# Patient Record
Sex: MALE | Race: BLACK OR AFRICAN AMERICAN | NOT HISPANIC OR LATINO
[De-identification: names, ages, dates, MRNs, and addresses within clinical notes are randomized per-mention and may not be internally consistent; named-entity substitution may affect disease eponyms.]

---
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
## 2020-08-03 ENCOUNTER — WALK IN (OUTPATIENT)
Dept: URGENT CARE | Age: 20
End: 2020-08-03

## 2020-08-03 DIAGNOSIS — H92.01 OTALGIA OF RIGHT EAR: Primary | ICD-10-CM

## 2020-08-03 PROCEDURE — 99213 OFFICE O/P EST LOW 20 MIN: CPT | Performed by: NURSE PRACTITIONER

## 2020-08-03 RX ORDER — OFLOXACIN 3 MG/ML
SOLUTION AURICULAR (OTIC)
COMMUNITY
Start: 2020-08-01

## 2020-08-03 SDOH — HEALTH STABILITY: MENTAL HEALTH: HOW OFTEN DO YOU HAVE A DRINK CONTAINING ALCOHOL?: NEVER

## 2020-08-03 ASSESSMENT — ENCOUNTER SYMPTOMS
WOUND: 0
PSYCHIATRIC NEGATIVE: 1
STRIDOR: 0
SWOLLEN GLANDS: 0
COLOR CHANGE: 0
FACIAL SWELLING: 0
CHILLS: 0
PHOTOPHOBIA: 0
APPETITE CHANGE: 0
HEMATOLOGIC/LYMPHATIC NEGATIVE: 1
VISUAL CHANGE: 0
EYE REDNESS: 0
ABDOMINAL PAIN: 0
VOMITING: 0
EYE PAIN: 0
CHANGE IN BOWEL HABIT: 0
CHOKING: 0
FEVER: 0
ANOREXIA: 0
ABDOMINAL DISTENTION: 0
WEAKNESS: 0
NUMBNESS: 0
COUGH: 0
SEIZURES: 0
SINUS PRESSURE: 0
EYE DISCHARGE: 0
SINUS PAIN: 0
DIAPHORESIS: 0
CHEST TIGHTNESS: 0
TROUBLE SWALLOWING: 0
LIGHT-HEADEDNESS: 0
VERTIGO: 0
SORE THROAT: 0
HEADACHES: 0
NAUSEA: 0
DIZZINESS: 0
WHEEZING: 0
CONSTIPATION: 0
ACTIVITY CHANGE: 0
RHINORRHEA: 0
EYE ITCHING: 0
APNEA: 0
DIARRHEA: 0
FATIGUE: 0
VOICE CHANGE: 0
TREMORS: 0
FACIAL ASYMMETRY: 0
SPEECH DIFFICULTY: 0
SHORTNESS OF BREATH: 0

## 2020-08-03 ASSESSMENT — PAIN SCALES - GENERAL: PAINLEVEL: 9-10

## 2020-08-07 ENCOUNTER — APPOINTMENT (OUTPATIENT)
Dept: OTOLARYNGOLOGY | Age: 20
End: 2020-08-07

## 2020-08-10 ENCOUNTER — APPOINTMENT (OUTPATIENT)
Dept: OTOLARYNGOLOGY | Age: 20
End: 2020-08-10

## 2020-08-10 ENCOUNTER — TELEPHONE (OUTPATIENT)
Dept: SCHEDULING | Age: 20
End: 2020-08-10

## 2020-09-02 ENCOUNTER — APPOINTMENT (OUTPATIENT)
Dept: OTOLARYNGOLOGY | Age: 20
End: 2020-09-02

## 2021-05-25 VITALS
BODY MASS INDEX: 26.46 KG/M2 | OXYGEN SATURATION: 99 % | SYSTOLIC BLOOD PRESSURE: 123 MMHG | RESPIRATION RATE: 16 BRPM | TEMPERATURE: 98.2 F | DIASTOLIC BLOOD PRESSURE: 82 MMHG | HEIGHT: 74 IN | HEART RATE: 75 BPM | WEIGHT: 206.2 LBS

## 2021-06-22 ENCOUNTER — APPOINTMENT (EMERGENCY)
Dept: CT IMAGING | Facility: HOSPITAL | Age: 21
DRG: 245 | End: 2021-06-22
Payer: COMMERCIAL

## 2021-06-22 ENCOUNTER — HOSPITAL ENCOUNTER (INPATIENT)
Facility: HOSPITAL | Age: 21
LOS: 7 days | Discharge: HOME | DRG: 245 | End: 2021-06-30
Attending: EMERGENCY MEDICINE | Admitting: FAMILY MEDICINE
Payer: COMMERCIAL

## 2021-06-22 DIAGNOSIS — D50.0 IRON DEFICIENCY ANEMIA DUE TO CHRONIC BLOOD LOSS: ICD-10-CM

## 2021-06-22 DIAGNOSIS — K52.9 COLITIS: Primary | ICD-10-CM

## 2021-06-22 DIAGNOSIS — K51.911 ULCERATIVE COLITIS WITH RECTAL BLEEDING, UNSPECIFIED LOCATION (HCC): ICD-10-CM

## 2021-06-22 DIAGNOSIS — K62.5 RECTAL BLEEDING: ICD-10-CM

## 2021-06-22 PROBLEM — D64.9 ANEMIA: Status: ACTIVE | Noted: 2021-06-22

## 2021-06-22 LAB
ALBUMIN SERPL BCP-MCNC: 3.1 G/DL (ref 3.5–5)
ALP SERPL-CCNC: 61 U/L (ref 46–116)
ALT SERPL W P-5'-P-CCNC: 19 U/L (ref 12–78)
ANION GAP SERPL CALCULATED.3IONS-SCNC: 8 MMOL/L (ref 4–13)
AST SERPL W P-5'-P-CCNC: 11 U/L (ref 5–45)
BASOPHILS # BLD AUTO: 0.04 THOUSANDS/ΜL (ref 0–0.1)
BASOPHILS NFR BLD AUTO: 1 % (ref 0–1)
BILIRUB SERPL-MCNC: 0.54 MG/DL (ref 0.2–1)
BUN SERPL-MCNC: 12 MG/DL (ref 5–25)
CALCIUM ALBUM COR SERPL-MCNC: 9.6 MG/DL (ref 8.3–10.1)
CALCIUM SERPL-MCNC: 8.9 MG/DL (ref 8.3–10.1)
CHLORIDE SERPL-SCNC: 102 MMOL/L (ref 100–108)
CO2 SERPL-SCNC: 27 MMOL/L (ref 21–32)
CREAT SERPL-MCNC: 1.04 MG/DL (ref 0.6–1.3)
EOSINOPHIL # BLD AUTO: 0.39 THOUSAND/ΜL (ref 0–0.61)
EOSINOPHIL NFR BLD AUTO: 5 % (ref 0–6)
ERYTHROCYTE [DISTWIDTH] IN BLOOD BY AUTOMATED COUNT: 13.5 % (ref 11.6–15.1)
GFR SERPL CREATININE-BSD FRML MDRD: 102 ML/MIN/1.73SQ M
GLUCOSE SERPL-MCNC: 100 MG/DL (ref 65–140)
HCT VFR BLD AUTO: 30.6 % (ref 36.5–49.3)
HGB BLD-MCNC: 9.2 G/DL (ref 12–17)
IMM GRANULOCYTES # BLD AUTO: 0.02 THOUSAND/UL (ref 0–0.2)
IMM GRANULOCYTES NFR BLD AUTO: 0 % (ref 0–2)
LIPASE SERPL-CCNC: 57 U/L (ref 73–393)
LYMPHOCYTES # BLD AUTO: 2.4 THOUSANDS/ΜL (ref 0.6–4.47)
LYMPHOCYTES NFR BLD AUTO: 31 % (ref 14–44)
MCH RBC QN AUTO: 22.4 PG (ref 26.8–34.3)
MCHC RBC AUTO-ENTMCNC: 30.1 G/DL (ref 31.4–37.4)
MCV RBC AUTO: 75 FL (ref 82–98)
MONOCYTES # BLD AUTO: 0.93 THOUSAND/ΜL (ref 0.17–1.22)
MONOCYTES NFR BLD AUTO: 12 % (ref 4–12)
NEUTROPHILS # BLD AUTO: 3.98 THOUSANDS/ΜL (ref 1.85–7.62)
NEUTS SEG NFR BLD AUTO: 51 % (ref 43–75)
NRBC BLD AUTO-RTO: 0 /100 WBCS
PLATELET # BLD AUTO: 283 THOUSANDS/UL (ref 149–390)
PMV BLD AUTO: 10.8 FL (ref 8.9–12.7)
POTASSIUM SERPL-SCNC: 3.8 MMOL/L (ref 3.5–5.3)
PROT SERPL-MCNC: 7.5 G/DL (ref 6.4–8.2)
RBC # BLD AUTO: 4.11 MILLION/UL (ref 3.88–5.62)
SODIUM SERPL-SCNC: 137 MMOL/L (ref 136–145)
WBC # BLD AUTO: 7.76 THOUSAND/UL (ref 4.31–10.16)

## 2021-06-22 PROCEDURE — 99220 PR INITIAL OBSERVATION CARE/DAY 70 MINUTES: CPT | Performed by: INTERNAL MEDICINE

## 2021-06-22 PROCEDURE — 99285 EMERGENCY DEPT VISIT HI MDM: CPT | Performed by: EMERGENCY MEDICINE

## 2021-06-22 PROCEDURE — 99285 EMERGENCY DEPT VISIT HI MDM: CPT

## 2021-06-22 PROCEDURE — 74177 CT ABD & PELVIS W/CONTRAST: CPT

## 2021-06-22 PROCEDURE — 96361 HYDRATE IV INFUSION ADD-ON: CPT

## 2021-06-22 PROCEDURE — C9113 INJ PANTOPRAZOLE SODIUM, VIA: HCPCS | Performed by: INTERNAL MEDICINE

## 2021-06-22 PROCEDURE — 83690 ASSAY OF LIPASE: CPT | Performed by: EMERGENCY MEDICINE

## 2021-06-22 PROCEDURE — 80053 COMPREHEN METABOLIC PANEL: CPT | Performed by: EMERGENCY MEDICINE

## 2021-06-22 PROCEDURE — 96360 HYDRATION IV INFUSION INIT: CPT

## 2021-06-22 PROCEDURE — 36415 COLL VENOUS BLD VENIPUNCTURE: CPT

## 2021-06-22 PROCEDURE — 83550 IRON BINDING TEST: CPT | Performed by: INTERNAL MEDICINE

## 2021-06-22 PROCEDURE — 83540 ASSAY OF IRON: CPT | Performed by: INTERNAL MEDICINE

## 2021-06-22 PROCEDURE — 82728 ASSAY OF FERRITIN: CPT | Performed by: INTERNAL MEDICINE

## 2021-06-22 PROCEDURE — 85025 COMPLETE CBC W/AUTO DIFF WBC: CPT | Performed by: EMERGENCY MEDICINE

## 2021-06-22 RX ORDER — DICYCLOMINE HCL 20 MG
20 TABLET ORAL ONCE
Status: COMPLETED | OUTPATIENT
Start: 2021-06-22 | End: 2021-06-22

## 2021-06-22 RX ORDER — SODIUM CHLORIDE 9 MG/ML
100 INJECTION, SOLUTION INTRAVENOUS CONTINUOUS
Status: DISCONTINUED | OUTPATIENT
Start: 2021-06-22 | End: 2021-06-25

## 2021-06-22 RX ORDER — ONDANSETRON 2 MG/ML
4 INJECTION INTRAMUSCULAR; INTRAVENOUS EVERY 6 HOURS PRN
Status: DISCONTINUED | OUTPATIENT
Start: 2021-06-22 | End: 2021-06-30 | Stop reason: HOSPADM

## 2021-06-22 RX ORDER — PANTOPRAZOLE SODIUM 40 MG/1
40 INJECTION, POWDER, FOR SOLUTION INTRAVENOUS EVERY 12 HOURS SCHEDULED
Status: DISCONTINUED | OUTPATIENT
Start: 2021-06-22 | End: 2021-06-29

## 2021-06-22 RX ORDER — ACETAMINOPHEN 325 MG/1
650 TABLET ORAL EVERY 6 HOURS PRN
Status: DISCONTINUED | OUTPATIENT
Start: 2021-06-22 | End: 2021-06-30 | Stop reason: HOSPADM

## 2021-06-22 RX ADMIN — IOHEXOL 100 ML: 350 INJECTION, SOLUTION INTRAVENOUS at 15:56

## 2021-06-22 RX ADMIN — SODIUM CHLORIDE 100 ML/HR: 0.9 INJECTION, SOLUTION INTRAVENOUS at 17:49

## 2021-06-22 RX ADMIN — DICYCLOMINE HYDROCHLORIDE 20 MG: 20 TABLET ORAL at 15:42

## 2021-06-22 RX ADMIN — SODIUM CHLORIDE 1000 ML: 0.9 INJECTION, SOLUTION INTRAVENOUS at 15:42

## 2021-06-22 RX ADMIN — PANTOPRAZOLE SODIUM 40 MG: 40 INJECTION, POWDER, FOR SOLUTION INTRAVENOUS at 21:30

## 2021-06-23 LAB
ANION GAP SERPL CALCULATED.3IONS-SCNC: 9 MMOL/L (ref 4–13)
BUN SERPL-MCNC: 10 MG/DL (ref 5–25)
CALCIUM SERPL-MCNC: 8.9 MG/DL (ref 8.3–10.1)
CHLORIDE SERPL-SCNC: 103 MMOL/L (ref 100–108)
CO2 SERPL-SCNC: 27 MMOL/L (ref 21–32)
CREAT SERPL-MCNC: 0.92 MG/DL (ref 0.6–1.3)
CRP SERPL QL: 114.7 MG/L
ERYTHROCYTE [DISTWIDTH] IN BLOOD BY AUTOMATED COUNT: 13.5 % (ref 11.6–15.1)
FERRITIN SERPL-MCNC: 17 NG/ML (ref 8–388)
GFR SERPL CREATININE-BSD FRML MDRD: 118 ML/MIN/1.73SQ M
GLUCOSE P FAST SERPL-MCNC: 88 MG/DL (ref 65–99)
GLUCOSE SERPL-MCNC: 88 MG/DL (ref 65–140)
HCT VFR BLD AUTO: 24.7 % (ref 36.5–49.3)
HCT VFR BLD AUTO: 25.8 % (ref 36.5–49.3)
HGB BLD-MCNC: 7.5 G/DL (ref 12–17)
HGB BLD-MCNC: 7.8 G/DL (ref 12–17)
IRON SATN MFR SERPL: 3 %
IRON SERPL-MCNC: 10 UG/DL (ref 65–175)
MCH RBC QN AUTO: 22.7 PG (ref 26.8–34.3)
MCHC RBC AUTO-ENTMCNC: 30.2 G/DL (ref 31.4–37.4)
MCV RBC AUTO: 75 FL (ref 82–98)
PLATELET # BLD AUTO: 260 THOUSANDS/UL (ref 149–390)
PMV BLD AUTO: 11.1 FL (ref 8.9–12.7)
POTASSIUM SERPL-SCNC: 4 MMOL/L (ref 3.5–5.3)
RBC # BLD AUTO: 3.43 MILLION/UL (ref 3.88–5.62)
SODIUM SERPL-SCNC: 139 MMOL/L (ref 136–145)
TIBC SERPL-MCNC: 394 UG/DL (ref 250–450)
WBC # BLD AUTO: 6.89 THOUSAND/UL (ref 4.31–10.16)

## 2021-06-23 PROCEDURE — 83993 ASSAY FOR CALPROTECTIN FECAL: CPT | Performed by: PHYSICIAN ASSISTANT

## 2021-06-23 PROCEDURE — 85027 COMPLETE CBC AUTOMATED: CPT | Performed by: INTERNAL MEDICINE

## 2021-06-23 PROCEDURE — 85018 HEMOGLOBIN: CPT | Performed by: INTERNAL MEDICINE

## 2021-06-23 PROCEDURE — NC001 PR NO CHARGE: Performed by: PHYSICIAN ASSISTANT

## 2021-06-23 PROCEDURE — 87505 NFCT AGENT DETECTION GI: CPT | Performed by: PHYSICIAN ASSISTANT

## 2021-06-23 PROCEDURE — 80048 BASIC METABOLIC PNL TOTAL CA: CPT | Performed by: INTERNAL MEDICINE

## 2021-06-23 PROCEDURE — C9113 INJ PANTOPRAZOLE SODIUM, VIA: HCPCS | Performed by: INTERNAL MEDICINE

## 2021-06-23 PROCEDURE — 85014 HEMATOCRIT: CPT | Performed by: INTERNAL MEDICINE

## 2021-06-23 PROCEDURE — 99223 1ST HOSP IP/OBS HIGH 75: CPT | Performed by: INTERNAL MEDICINE

## 2021-06-23 PROCEDURE — 99232 SBSQ HOSP IP/OBS MODERATE 35: CPT | Performed by: FAMILY MEDICINE

## 2021-06-23 PROCEDURE — 87493 C DIFF AMPLIFIED PROBE: CPT | Performed by: PHYSICIAN ASSISTANT

## 2021-06-23 PROCEDURE — 86140 C-REACTIVE PROTEIN: CPT | Performed by: PHYSICIAN ASSISTANT

## 2021-06-23 RX ORDER — POLYETHYLENE GLYCOL 3350, SODIUM CHLORIDE, SODIUM BICARBONATE, POTASSIUM CHLORIDE 420; 11.2; 5.72; 1.48 G/4L; G/4L; G/4L; G/4L
4000 POWDER, FOR SOLUTION ORAL SEE ADMIN INSTRUCTIONS
Status: COMPLETED | OUTPATIENT
Start: 2021-06-23 | End: 2021-06-23

## 2021-06-23 RX ADMIN — IRON SUCROSE 300 MG: 20 INJECTION, SOLUTION INTRAVENOUS at 09:53

## 2021-06-23 RX ADMIN — SODIUM CHLORIDE 100 ML/HR: 0.9 INJECTION, SOLUTION INTRAVENOUS at 18:55

## 2021-06-23 RX ADMIN — Medication 10 MG: at 17:17

## 2021-06-23 RX ADMIN — SODIUM CHLORIDE 100 ML/HR: 0.9 INJECTION, SOLUTION INTRAVENOUS at 07:22

## 2021-06-23 RX ADMIN — PANTOPRAZOLE SODIUM 40 MG: 40 INJECTION, POWDER, FOR SOLUTION INTRAVENOUS at 08:07

## 2021-06-23 RX ADMIN — Medication 10 MG: at 20:25

## 2021-06-23 RX ADMIN — POLYETHYLENE GLYCOL 3350, SODIUM CHLORIDE, SODIUM BICARBONATE AND POTASSIUM CHLORIDE WITH LEMON FLAVOR 4000 ML: 420; 11.2; 5.72; 1.48 POWDER, FOR SOLUTION ORAL at 17:17

## 2021-06-23 RX ADMIN — PANTOPRAZOLE SODIUM 40 MG: 40 INJECTION, POWDER, FOR SOLUTION INTRAVENOUS at 21:54

## 2021-06-24 LAB
C DIFF TOX B TCDB STL QL NAA+PROBE: NEGATIVE
CAMPYLOBACTER DNA SPEC NAA+PROBE: NORMAL
HCT VFR BLD AUTO: 25.1 % (ref 36.5–49.3)
HGB BLD-MCNC: 7.7 G/DL (ref 12–17)
SALMONELLA DNA SPEC QL NAA+PROBE: NORMAL
SHIGA TOXIN STX GENE SPEC NAA+PROBE: NORMAL
SHIGELLA DNA SPEC QL NAA+PROBE: NORMAL

## 2021-06-24 PROCEDURE — NC001 PR NO CHARGE: Performed by: PHYSICIAN ASSISTANT

## 2021-06-24 PROCEDURE — 99232 SBSQ HOSP IP/OBS MODERATE 35: CPT | Performed by: FAMILY MEDICINE

## 2021-06-24 PROCEDURE — 99232 SBSQ HOSP IP/OBS MODERATE 35: CPT | Performed by: INTERNAL MEDICINE

## 2021-06-24 PROCEDURE — C9113 INJ PANTOPRAZOLE SODIUM, VIA: HCPCS | Performed by: INTERNAL MEDICINE

## 2021-06-24 PROCEDURE — 85018 HEMOGLOBIN: CPT | Performed by: FAMILY MEDICINE

## 2021-06-24 PROCEDURE — 85014 HEMATOCRIT: CPT | Performed by: FAMILY MEDICINE

## 2021-06-24 RX ORDER — MAGNESIUM CARB/ALUMINUM HYDROX 105-160MG
296 TABLET,CHEWABLE ORAL ONCE
Status: COMPLETED | OUTPATIENT
Start: 2021-06-24 | End: 2021-06-24

## 2021-06-24 RX ADMIN — SODIUM CHLORIDE 100 ML/HR: 0.9 INJECTION, SOLUTION INTRAVENOUS at 05:05

## 2021-06-24 RX ADMIN — PANTOPRAZOLE SODIUM 40 MG: 40 INJECTION, POWDER, FOR SOLUTION INTRAVENOUS at 08:45

## 2021-06-24 RX ADMIN — MAGNESIUM CITRATE 296 ML: 1.75 LIQUID ORAL at 14:59

## 2021-06-24 RX ADMIN — SODIUM CHLORIDE 100 ML/HR: 0.9 INJECTION, SOLUTION INTRAVENOUS at 17:35

## 2021-06-24 RX ADMIN — MAGNESIUM CITRATE 296 ML: 1.75 LIQUID ORAL at 08:45

## 2021-06-24 RX ADMIN — PANTOPRAZOLE SODIUM 40 MG: 40 INJECTION, POWDER, FOR SOLUTION INTRAVENOUS at 21:01

## 2021-06-24 RX ADMIN — IRON SUCROSE 300 MG: 20 INJECTION, SOLUTION INTRAVENOUS at 08:45

## 2021-06-24 RX ADMIN — Medication 10 MG: at 17:33

## 2021-06-25 ENCOUNTER — APPOINTMENT (INPATIENT)
Dept: GASTROENTEROLOGY | Facility: HOSPITAL | Age: 21
DRG: 245 | End: 2021-06-25
Payer: COMMERCIAL

## 2021-06-25 ENCOUNTER — ANESTHESIA (INPATIENT)
Dept: GASTROENTEROLOGY | Facility: HOSPITAL | Age: 21
DRG: 245 | End: 2021-06-25
Payer: COMMERCIAL

## 2021-06-25 ENCOUNTER — ANESTHESIA EVENT (INPATIENT)
Dept: ANESTHESIOLOGY | Facility: HOSPITAL | Age: 21
DRG: 245 | End: 2021-06-25
Payer: COMMERCIAL

## 2021-06-25 ENCOUNTER — ANESTHESIA EVENT (INPATIENT)
Dept: GASTROENTEROLOGY | Facility: HOSPITAL | Age: 21
DRG: 245 | End: 2021-06-25
Payer: COMMERCIAL

## 2021-06-25 ENCOUNTER — ANESTHESIA (INPATIENT)
Dept: ANESTHESIOLOGY | Facility: HOSPITAL | Age: 21
DRG: 245 | End: 2021-06-25
Payer: COMMERCIAL

## 2021-06-25 PROBLEM — K51.90 ULCERATIVE COLITIS (HCC): Status: ACTIVE | Noted: 2021-06-25

## 2021-06-25 LAB
BASOPHILS # BLD AUTO: 0.04 THOUSANDS/ΜL (ref 0–0.1)
BASOPHILS NFR BLD AUTO: 1 % (ref 0–1)
EOSINOPHIL # BLD AUTO: 0.17 THOUSAND/ΜL (ref 0–0.61)
EOSINOPHIL NFR BLD AUTO: 2 % (ref 0–6)
ERYTHROCYTE [DISTWIDTH] IN BLOOD BY AUTOMATED COUNT: 13.9 % (ref 11.6–15.1)
HCT VFR BLD AUTO: 25.3 % (ref 36.5–49.3)
HGB BLD-MCNC: 7.6 G/DL (ref 12–17)
IMM GRANULOCYTES # BLD AUTO: 0.05 THOUSAND/UL (ref 0–0.2)
IMM GRANULOCYTES NFR BLD AUTO: 1 % (ref 0–2)
LYMPHOCYTES # BLD AUTO: 1.85 THOUSANDS/ΜL (ref 0.6–4.47)
LYMPHOCYTES NFR BLD AUTO: 24 % (ref 14–44)
MCH RBC QN AUTO: 22.4 PG (ref 26.8–34.3)
MCHC RBC AUTO-ENTMCNC: 30 G/DL (ref 31.4–37.4)
MCV RBC AUTO: 75 FL (ref 82–98)
MONOCYTES # BLD AUTO: 1.03 THOUSAND/ΜL (ref 0.17–1.22)
MONOCYTES NFR BLD AUTO: 14 % (ref 4–12)
NEUTROPHILS # BLD AUTO: 4.49 THOUSANDS/ΜL (ref 1.85–7.62)
NEUTS SEG NFR BLD AUTO: 58 % (ref 43–75)
NRBC BLD AUTO-RTO: 0 /100 WBCS
PLATELET # BLD AUTO: 248 THOUSANDS/UL (ref 149–390)
PMV BLD AUTO: 10.6 FL (ref 8.9–12.7)
RBC # BLD AUTO: 3.39 MILLION/UL (ref 3.88–5.62)
WBC # BLD AUTO: 7.63 THOUSAND/UL (ref 4.31–10.16)

## 2021-06-25 PROCEDURE — 45380 COLONOSCOPY AND BIOPSY: CPT | Performed by: INTERNAL MEDICINE

## 2021-06-25 PROCEDURE — 0DDN8ZX EXTRACTION OF SIGMOID COLON, VIA NATURAL OR ARTIFICIAL OPENING ENDOSCOPIC, DIAGNOSTIC: ICD-10-PCS | Performed by: INTERNAL MEDICINE

## 2021-06-25 PROCEDURE — 0DDK8ZX EXTRACTION OF ASCENDING COLON, VIA NATURAL OR ARTIFICIAL OPENING ENDOSCOPIC, DIAGNOSTIC: ICD-10-PCS | Performed by: INTERNAL MEDICINE

## 2021-06-25 PROCEDURE — 0DDM8ZX EXTRACTION OF DESCENDING COLON, VIA NATURAL OR ARTIFICIAL OPENING ENDOSCOPIC, DIAGNOSTIC: ICD-10-PCS | Performed by: INTERNAL MEDICINE

## 2021-06-25 PROCEDURE — 85025 COMPLETE CBC W/AUTO DIFF WBC: CPT | Performed by: FAMILY MEDICINE

## 2021-06-25 PROCEDURE — 99232 SBSQ HOSP IP/OBS MODERATE 35: CPT | Performed by: FAMILY MEDICINE

## 2021-06-25 PROCEDURE — 88305 TISSUE EXAM BY PATHOLOGIST: CPT | Performed by: PATHOLOGY

## 2021-06-25 PROCEDURE — 88342 IMHCHEM/IMCYTCHM 1ST ANTB: CPT | Performed by: PATHOLOGY

## 2021-06-25 PROCEDURE — 0DDL8ZX EXTRACTION OF TRANSVERSE COLON, VIA NATURAL OR ARTIFICIAL OPENING ENDOSCOPIC, DIAGNOSTIC: ICD-10-PCS | Performed by: INTERNAL MEDICINE

## 2021-06-25 PROCEDURE — 0DDH8ZX EXTRACTION OF CECUM, VIA NATURAL OR ARTIFICIAL OPENING ENDOSCOPIC, DIAGNOSTIC: ICD-10-PCS | Performed by: INTERNAL MEDICINE

## 2021-06-25 PROCEDURE — C9113 INJ PANTOPRAZOLE SODIUM, VIA: HCPCS | Performed by: INTERNAL MEDICINE

## 2021-06-25 RX ORDER — PROPOFOL 10 MG/ML
INJECTION, EMULSION INTRAVENOUS AS NEEDED
Status: DISCONTINUED | OUTPATIENT
Start: 2021-06-25 | End: 2021-06-25

## 2021-06-25 RX ORDER — MESALAMINE 400 MG/1
800 CAPSULE, DELAYED RELEASE ORAL 3 TIMES DAILY
Status: DISCONTINUED | OUTPATIENT
Start: 2021-06-25 | End: 2021-06-30 | Stop reason: HOSPADM

## 2021-06-25 RX ORDER — LIDOCAINE HYDROCHLORIDE 10 MG/ML
INJECTION, SOLUTION EPIDURAL; INFILTRATION; INTRACAUDAL; PERINEURAL AS NEEDED
Status: DISCONTINUED | OUTPATIENT
Start: 2021-06-25 | End: 2021-06-25

## 2021-06-25 RX ORDER — FENTANYL CITRATE 50 UG/ML
50 INJECTION, SOLUTION INTRAMUSCULAR; INTRAVENOUS ONCE
Status: COMPLETED | OUTPATIENT
Start: 2021-06-25 | End: 2021-06-25

## 2021-06-25 RX ORDER — METHYLPREDNISOLONE SODIUM SUCCINATE 40 MG/ML
20 INJECTION, POWDER, LYOPHILIZED, FOR SOLUTION INTRAMUSCULAR; INTRAVENOUS EVERY 8 HOURS SCHEDULED
Status: DISCONTINUED | OUTPATIENT
Start: 2021-06-25 | End: 2021-06-26

## 2021-06-25 RX ADMIN — PROPOFOL 40 MG: 10 INJECTION, EMULSION INTRAVENOUS at 13:47

## 2021-06-25 RX ADMIN — MESALAMINE 800 MG: 400 CAPSULE, DELAYED RELEASE ORAL at 22:35

## 2021-06-25 RX ADMIN — PROPOFOL 30 MG: 10 INJECTION, EMULSION INTRAVENOUS at 13:35

## 2021-06-25 RX ADMIN — PANTOPRAZOLE SODIUM 40 MG: 40 INJECTION, POWDER, FOR SOLUTION INTRAVENOUS at 22:35

## 2021-06-25 RX ADMIN — FENTANYL CITRATE 50 MCG: 50 INJECTION, SOLUTION INTRAMUSCULAR; INTRAVENOUS at 14:14

## 2021-06-25 RX ADMIN — PANTOPRAZOLE SODIUM 40 MG: 40 INJECTION, POWDER, FOR SOLUTION INTRAVENOUS at 08:05

## 2021-06-25 RX ADMIN — METHYLPREDNISOLONE SODIUM SUCCINATE 20 MG: 40 INJECTION, POWDER, FOR SOLUTION INTRAMUSCULAR; INTRAVENOUS at 16:12

## 2021-06-25 RX ADMIN — PROPOFOL 100 MG: 10 INJECTION, EMULSION INTRAVENOUS at 13:27

## 2021-06-25 RX ADMIN — METHYLPREDNISOLONE SODIUM SUCCINATE 20 MG: 40 INJECTION, POWDER, FOR SOLUTION INTRAMUSCULAR; INTRAVENOUS at 22:35

## 2021-06-25 RX ADMIN — PROPOFOL 30 MG: 10 INJECTION, EMULSION INTRAVENOUS at 13:32

## 2021-06-25 RX ADMIN — PROPOFOL 20 MG: 10 INJECTION, EMULSION INTRAVENOUS at 13:38

## 2021-06-25 RX ADMIN — MESALAMINE 800 MG: 400 CAPSULE, DELAYED RELEASE ORAL at 16:11

## 2021-06-25 RX ADMIN — LIDOCAINE HYDROCHLORIDE 50 MG: 10 INJECTION, SOLUTION EPIDURAL; INFILTRATION; INTRACAUDAL; PERINEURAL at 13:23

## 2021-06-25 RX ADMIN — SODIUM CHLORIDE: 0.9 INJECTION, SOLUTION INTRAVENOUS at 13:50

## 2021-06-25 RX ADMIN — PROPOFOL 150 MG: 10 INJECTION, EMULSION INTRAVENOUS at 13:24

## 2021-06-25 RX ADMIN — PROPOFOL 20 MG: 10 INJECTION, EMULSION INTRAVENOUS at 13:41

## 2021-06-25 RX ADMIN — PROPOFOL 20 MG: 10 INJECTION, EMULSION INTRAVENOUS at 13:45

## 2021-06-25 RX ADMIN — IRON SUCROSE 300 MG: 20 INJECTION, SOLUTION INTRAVENOUS at 08:05

## 2021-06-25 NOTE — ANESTHESIA POSTPROCEDURE EVALUATION
Post-Op Assessment Note    CV Status:  Stable  Pain Score: 0    Pain management: adequate     Mental Status:  Sleepy   Hydration Status:  Stable   PONV Controlled:  None   Airway Patency:  Patent      Post Op Vitals Reviewed: Yes      Staff: CRNA         No complications documented      BP   117/61   Temp      Pulse  85   Resp 20   SpO2   100

## 2021-06-25 NOTE — ANESTHESIA PREPROCEDURE EVALUATION
Procedure:  PRE-OP ONLY    Relevant Problems   GI/HEPATIC   (+) Rectal bleeding      HEMATOLOGY   (+) Anemia             Anesthesia Plan  ASA Score- 2     Anesthesia Type- IV sedation with anesthesia with ASA Monitors  Additional Monitors:   Airway Plan:     Comment: Discussed risks/benefits, including medication reactions, awareness, aspiration, and serious/life threatening complications  Plan to maintain native airway with IVGA, monitored with EtCO2  Plan Factors-Exercise tolerance (METS): >4 METS  Patient summary reviewed  Patient instructed to abstain from smoking on day of procedure  Patient did not smoke on day of surgery  Induction- intravenous  Postoperative Plan-     Informed Consent- Anesthetic plan and risks discussed with patient  I personally reviewed this patient with the CRNA  Discussed and agreed on the Anesthesia Plan with the LAKHWINDER Lazar

## 2021-06-25 NOTE — ANESTHESIA PREPROCEDURE EVALUATION
Procedure:  COLONOSCOPY    Hgb 7 6    Relevant Problems   GI/HEPATIC   (+) Rectal bleeding      HEMATOLOGY   (+) Anemia        Physical Exam    Airway    Mallampati score: I  TM Distance: >3 FB  Neck ROM: full     Dental   No notable dental hx     Cardiovascular  Rhythm: regular, Rate: normal, Cardiovascular exam normal    Pulmonary  Pulmonary exam normal Breath sounds clear to auscultation,     Other Findings        Anesthesia Plan  ASA Score- 2     Anesthesia Type- IV sedation with anesthesia with ASA Monitors  Additional Monitors:   Airway Plan:     Comment: Discussed risks/benefits, including medication reactions, awareness, aspiration, and serious/life threatening complications  Plan to maintain native airway with IVGA, monitored with EtCO2  Plan Factors-Exercise tolerance (METS): >4 METS  Patient summary reviewed  Patient instructed to abstain from smoking on day of procedure  Patient did not smoke on day of surgery  Induction- intravenous  Postoperative Plan-     Informed Consent- Anesthetic plan and risks discussed with patient  I personally reviewed this patient with the CRNA  Discussed and agreed on the Anesthesia Plan with the CRNA  Brandy Moreno

## 2021-06-26 LAB
HCT VFR BLD AUTO: 27.6 % (ref 36.5–49.3)
HGB BLD-MCNC: 8.3 G/DL (ref 12–17)

## 2021-06-26 PROCEDURE — 99232 SBSQ HOSP IP/OBS MODERATE 35: CPT | Performed by: INTERNAL MEDICINE

## 2021-06-26 PROCEDURE — C9113 INJ PANTOPRAZOLE SODIUM, VIA: HCPCS | Performed by: INTERNAL MEDICINE

## 2021-06-26 PROCEDURE — 85018 HEMOGLOBIN: CPT | Performed by: FAMILY MEDICINE

## 2021-06-26 PROCEDURE — 85014 HEMATOCRIT: CPT | Performed by: FAMILY MEDICINE

## 2021-06-26 PROCEDURE — NC001 PR NO CHARGE: Performed by: PHYSICIAN ASSISTANT

## 2021-06-26 PROCEDURE — 99232 SBSQ HOSP IP/OBS MODERATE 35: CPT | Performed by: FAMILY MEDICINE

## 2021-06-26 RX ORDER — METHYLPREDNISOLONE SODIUM SUCCINATE 40 MG/ML
20 INJECTION, POWDER, LYOPHILIZED, FOR SOLUTION INTRAMUSCULAR; INTRAVENOUS EVERY 6 HOURS SCHEDULED
Status: DISCONTINUED | OUTPATIENT
Start: 2021-06-26 | End: 2021-06-28

## 2021-06-26 RX ADMIN — MESALAMINE 800 MG: 400 CAPSULE, DELAYED RELEASE ORAL at 08:33

## 2021-06-26 RX ADMIN — METHYLPREDNISOLONE SODIUM SUCCINATE 20 MG: 40 INJECTION, POWDER, FOR SOLUTION INTRAMUSCULAR; INTRAVENOUS at 17:09

## 2021-06-26 RX ADMIN — MESALAMINE 800 MG: 400 CAPSULE, DELAYED RELEASE ORAL at 21:29

## 2021-06-26 RX ADMIN — MESALAMINE 800 MG: 400 CAPSULE, DELAYED RELEASE ORAL at 16:07

## 2021-06-26 RX ADMIN — METHYLPREDNISOLONE SODIUM SUCCINATE 20 MG: 40 INJECTION, POWDER, FOR SOLUTION INTRAMUSCULAR; INTRAVENOUS at 12:24

## 2021-06-26 RX ADMIN — PANTOPRAZOLE SODIUM 40 MG: 40 INJECTION, POWDER, FOR SOLUTION INTRAVENOUS at 08:33

## 2021-06-26 RX ADMIN — METHYLPREDNISOLONE SODIUM SUCCINATE 20 MG: 40 INJECTION, POWDER, FOR SOLUTION INTRAMUSCULAR; INTRAVENOUS at 05:35

## 2021-06-26 RX ADMIN — PANTOPRAZOLE SODIUM 40 MG: 40 INJECTION, POWDER, FOR SOLUTION INTRAVENOUS at 21:29

## 2021-06-26 RX ADMIN — IRON SUCROSE 300 MG: 20 INJECTION, SOLUTION INTRAVENOUS at 08:33

## 2021-06-27 LAB
CRP SERPL QL: 61.8 MG/L
HBV CORE AB SER QL: NORMAL
HBV SURFACE AB SER-ACNC: >1000 MIU/ML
HBV SURFACE AG SER QL: NORMAL

## 2021-06-27 PROCEDURE — 99232 SBSQ HOSP IP/OBS MODERATE 35: CPT | Performed by: PHYSICIAN ASSISTANT

## 2021-06-27 PROCEDURE — 99232 SBSQ HOSP IP/OBS MODERATE 35: CPT | Performed by: FAMILY MEDICINE

## 2021-06-27 PROCEDURE — C9113 INJ PANTOPRAZOLE SODIUM, VIA: HCPCS | Performed by: INTERNAL MEDICINE

## 2021-06-27 PROCEDURE — 86706 HEP B SURFACE ANTIBODY: CPT | Performed by: PHYSICIAN ASSISTANT

## 2021-06-27 PROCEDURE — 86140 C-REACTIVE PROTEIN: CPT | Performed by: PHYSICIAN ASSISTANT

## 2021-06-27 PROCEDURE — 87340 HEPATITIS B SURFACE AG IA: CPT | Performed by: PHYSICIAN ASSISTANT

## 2021-06-27 PROCEDURE — 86704 HEP B CORE ANTIBODY TOTAL: CPT | Performed by: PHYSICIAN ASSISTANT

## 2021-06-27 PROCEDURE — 99232 SBSQ HOSP IP/OBS MODERATE 35: CPT | Performed by: INTERNAL MEDICINE

## 2021-06-27 PROCEDURE — 86480 TB TEST CELL IMMUN MEASURE: CPT | Performed by: PHYSICIAN ASSISTANT

## 2021-06-27 RX ORDER — SODIUM CHLORIDE 9 MG/ML
100 INJECTION, SOLUTION INTRAVENOUS CONTINUOUS
Status: DISCONTINUED | OUTPATIENT
Start: 2021-06-27 | End: 2021-06-30 | Stop reason: HOSPADM

## 2021-06-27 RX ADMIN — IRON SUCROSE 300 MG: 20 INJECTION, SOLUTION INTRAVENOUS at 09:02

## 2021-06-27 RX ADMIN — PANTOPRAZOLE SODIUM 40 MG: 40 INJECTION, POWDER, FOR SOLUTION INTRAVENOUS at 08:51

## 2021-06-27 RX ADMIN — MESALAMINE 800 MG: 400 CAPSULE, DELAYED RELEASE ORAL at 22:21

## 2021-06-27 RX ADMIN — MESALAMINE 800 MG: 400 CAPSULE, DELAYED RELEASE ORAL at 16:46

## 2021-06-27 RX ADMIN — METHYLPREDNISOLONE SODIUM SUCCINATE 20 MG: 40 INJECTION, POWDER, FOR SOLUTION INTRAMUSCULAR; INTRAVENOUS at 01:00

## 2021-06-27 RX ADMIN — METHYLPREDNISOLONE SODIUM SUCCINATE 20 MG: 40 INJECTION, POWDER, FOR SOLUTION INTRAMUSCULAR; INTRAVENOUS at 17:45

## 2021-06-27 RX ADMIN — METHYLPREDNISOLONE SODIUM SUCCINATE 20 MG: 40 INJECTION, POWDER, FOR SOLUTION INTRAMUSCULAR; INTRAVENOUS at 05:27

## 2021-06-27 RX ADMIN — SODIUM CHLORIDE 100 ML/HR: 0.9 INJECTION, SOLUTION INTRAVENOUS at 14:02

## 2021-06-27 RX ADMIN — MESALAMINE 800 MG: 400 CAPSULE, DELAYED RELEASE ORAL at 08:51

## 2021-06-27 RX ADMIN — METHYLPREDNISOLONE SODIUM SUCCINATE 20 MG: 40 INJECTION, POWDER, FOR SOLUTION INTRAMUSCULAR; INTRAVENOUS at 23:08

## 2021-06-27 RX ADMIN — PANTOPRAZOLE SODIUM 40 MG: 40 INJECTION, POWDER, FOR SOLUTION INTRAVENOUS at 22:20

## 2021-06-27 RX ADMIN — METHYLPREDNISOLONE SODIUM SUCCINATE 20 MG: 40 INJECTION, POWDER, FOR SOLUTION INTRAMUSCULAR; INTRAVENOUS at 12:03

## 2021-06-28 LAB
ANION GAP SERPL CALCULATED.3IONS-SCNC: 7 MMOL/L (ref 4–13)
BASOPHILS # BLD MANUAL: 0 THOUSAND/UL (ref 0–0.1)
BASOPHILS NFR MAR MANUAL: 0 % (ref 0–1)
BUN SERPL-MCNC: 12 MG/DL (ref 5–25)
CALCIUM SERPL-MCNC: 8.7 MG/DL (ref 8.3–10.1)
CALPROTECTIN STL-MCNT: 4033 UG/G (ref 0–120)
CHLORIDE SERPL-SCNC: 106 MMOL/L (ref 100–108)
CO2 SERPL-SCNC: 27 MMOL/L (ref 21–32)
CREAT SERPL-MCNC: 0.78 MG/DL (ref 0.6–1.3)
CRP SERPL QL: 36.7 MG/L
EOSINOPHIL # BLD MANUAL: 0 THOUSAND/UL (ref 0–0.4)
EOSINOPHIL NFR BLD MANUAL: 0 % (ref 0–6)
ERYTHROCYTE [DISTWIDTH] IN BLOOD BY AUTOMATED COUNT: 16.7 % (ref 11.6–15.1)
GFR SERPL CREATININE-BSD FRML MDRD: 129 ML/MIN/1.73SQ M
GLUCOSE SERPL-MCNC: 115 MG/DL (ref 65–140)
HCT VFR BLD AUTO: 25.6 % (ref 36.5–49.3)
HGB BLD-MCNC: 7.6 G/DL (ref 12–17)
HGB BLD-MCNC: 7.7 G/DL (ref 12–17)
HYPERCHROMIA BLD QL SMEAR: PRESENT
LYMPHOCYTES # BLD AUTO: 1.98 THOUSAND/UL (ref 0.6–4.47)
LYMPHOCYTES # BLD AUTO: 26 % (ref 14–44)
MCH RBC QN AUTO: 23.2 PG (ref 26.8–34.3)
MCHC RBC AUTO-ENTMCNC: 29.7 G/DL (ref 31.4–37.4)
MCV RBC AUTO: 78 FL (ref 82–98)
MONOCYTES # BLD AUTO: 0.46 THOUSAND/UL (ref 0–1.22)
MONOCYTES NFR BLD: 6 % (ref 4–12)
NEUTROPHILS # BLD MANUAL: 5.19 THOUSAND/UL (ref 1.85–7.62)
NEUTS BAND NFR BLD MANUAL: 18 % (ref 0–8)
NEUTS SEG NFR BLD AUTO: 50 % (ref 43–75)
NRBC BLD AUTO-RTO: 2 /100 WBC (ref 0–2)
NRBC BLD AUTO-RTO: 2 /100 WBCS
PLATELET # BLD AUTO: 284 THOUSANDS/UL (ref 149–390)
PLATELET BLD QL SMEAR: ADEQUATE
PMV BLD AUTO: 11.4 FL (ref 8.9–12.7)
POTASSIUM SERPL-SCNC: 3.8 MMOL/L (ref 3.5–5.3)
RBC # BLD AUTO: 3.28 MILLION/UL (ref 3.88–5.62)
SODIUM SERPL-SCNC: 140 MMOL/L (ref 136–145)
TOTAL CELLS COUNTED SPEC: 100
WBC # BLD AUTO: 7.63 THOUSAND/UL (ref 4.31–10.16)

## 2021-06-28 PROCEDURE — 86255 FLUORESCENT ANTIBODY SCREEN: CPT

## 2021-06-28 PROCEDURE — 83520 IMMUNOASSAY QUANT NOS NONAB: CPT

## 2021-06-28 PROCEDURE — 86140 C-REACTIVE PROTEIN: CPT

## 2021-06-28 PROCEDURE — 86140 C-REACTIVE PROTEIN: CPT | Performed by: PHYSICIAN ASSISTANT

## 2021-06-28 PROCEDURE — 81479 UNLISTED MOLECULAR PATHOLOGY: CPT

## 2021-06-28 PROCEDURE — 85018 HEMOGLOBIN: CPT | Performed by: PHYSICIAN ASSISTANT

## 2021-06-28 PROCEDURE — 80048 BASIC METABOLIC PNL TOTAL CA: CPT | Performed by: FAMILY MEDICINE

## 2021-06-28 PROCEDURE — 85007 BL SMEAR W/DIFF WBC COUNT: CPT | Performed by: PHYSICIAN ASSISTANT

## 2021-06-28 PROCEDURE — 99232 SBSQ HOSP IP/OBS MODERATE 35: CPT | Performed by: INTERNAL MEDICINE

## 2021-06-28 PROCEDURE — C9113 INJ PANTOPRAZOLE SODIUM, VIA: HCPCS | Performed by: INTERNAL MEDICINE

## 2021-06-28 PROCEDURE — 99232 SBSQ HOSP IP/OBS MODERATE 35: CPT | Performed by: FAMILY MEDICINE

## 2021-06-28 PROCEDURE — 85027 COMPLETE CBC AUTOMATED: CPT | Performed by: PHYSICIAN ASSISTANT

## 2021-06-28 PROCEDURE — 82397 CHEMILUMINESCENT ASSAY: CPT | Performed by: PHYSICIAN ASSISTANT

## 2021-06-28 RX ORDER — PREDNISONE 20 MG/1
40 TABLET ORAL DAILY
Status: DISCONTINUED | OUTPATIENT
Start: 2021-06-29 | End: 2021-06-29

## 2021-06-28 RX ORDER — FERROUS SULFATE 325(65) MG
325 TABLET ORAL
Status: DISCONTINUED | OUTPATIENT
Start: 2021-06-29 | End: 2021-06-30 | Stop reason: HOSPADM

## 2021-06-28 RX ORDER — METHYLPREDNISOLONE SODIUM SUCCINATE 40 MG/ML
20 INJECTION, POWDER, LYOPHILIZED, FOR SOLUTION INTRAMUSCULAR; INTRAVENOUS EVERY 6 HOURS SCHEDULED
Status: COMPLETED | OUTPATIENT
Start: 2021-06-28 | End: 2021-06-28

## 2021-06-28 RX ORDER — PREDNISOLONE SODIUM PHOSPHATE 15 MG/5ML
40 SOLUTION ORAL DAILY
Status: DISCONTINUED | OUTPATIENT
Start: 2021-06-29 | End: 2021-06-28

## 2021-06-28 RX ADMIN — METHYLPREDNISOLONE SODIUM SUCCINATE 20 MG: 40 INJECTION, POWDER, FOR SOLUTION INTRAMUSCULAR; INTRAVENOUS at 05:18

## 2021-06-28 RX ADMIN — METHYLPREDNISOLONE SODIUM SUCCINATE 20 MG: 40 INJECTION, POWDER, FOR SOLUTION INTRAMUSCULAR; INTRAVENOUS at 12:17

## 2021-06-28 RX ADMIN — SODIUM CHLORIDE 100 ML/HR: 0.9 INJECTION, SOLUTION INTRAVENOUS at 20:34

## 2021-06-28 RX ADMIN — METHYLPREDNISOLONE SODIUM SUCCINATE 20 MG: 40 INJECTION, POWDER, FOR SOLUTION INTRAMUSCULAR; INTRAVENOUS at 17:07

## 2021-06-28 RX ADMIN — MESALAMINE 800 MG: 400 CAPSULE, DELAYED RELEASE ORAL at 10:01

## 2021-06-28 RX ADMIN — PANTOPRAZOLE SODIUM 40 MG: 40 INJECTION, POWDER, FOR SOLUTION INTRAVENOUS at 20:26

## 2021-06-28 RX ADMIN — PANTOPRAZOLE SODIUM 40 MG: 40 INJECTION, POWDER, FOR SOLUTION INTRAVENOUS at 10:02

## 2021-06-28 RX ADMIN — SODIUM CHLORIDE 100 ML/HR: 0.9 INJECTION, SOLUTION INTRAVENOUS at 12:17

## 2021-06-28 RX ADMIN — MESALAMINE 800 MG: 400 CAPSULE, DELAYED RELEASE ORAL at 17:07

## 2021-06-28 RX ADMIN — MESALAMINE 800 MG: 400 CAPSULE, DELAYED RELEASE ORAL at 20:26

## 2021-06-29 PROBLEM — D62 ACUTE BLOOD LOSS ANEMIA: Status: ACTIVE | Noted: 2021-06-22

## 2021-06-29 LAB
ERYTHROCYTE [DISTWIDTH] IN BLOOD BY AUTOMATED COUNT: 18.4 % (ref 11.6–15.1)
HCT VFR BLD AUTO: 25 % (ref 36.5–49.3)
HGB BLD-MCNC: 7.3 G/DL (ref 12–17)
MCH RBC QN AUTO: 23.3 PG (ref 26.8–34.3)
MCHC RBC AUTO-ENTMCNC: 29.2 G/DL (ref 31.4–37.4)
MCV RBC AUTO: 80 FL (ref 82–98)
NRBC BLD AUTO-RTO: 2 /100 WBCS
PLATELET # BLD AUTO: 240 THOUSANDS/UL (ref 149–390)
PMV BLD AUTO: 11.1 FL (ref 8.9–12.7)
RBC # BLD AUTO: 3.13 MILLION/UL (ref 3.88–5.62)
WBC # BLD AUTO: 6.63 THOUSAND/UL (ref 4.31–10.16)

## 2021-06-29 PROCEDURE — C9113 INJ PANTOPRAZOLE SODIUM, VIA: HCPCS | Performed by: INTERNAL MEDICINE

## 2021-06-29 PROCEDURE — 99232 SBSQ HOSP IP/OBS MODERATE 35: CPT | Performed by: INTERNAL MEDICINE

## 2021-06-29 PROCEDURE — 99233 SBSQ HOSP IP/OBS HIGH 50: CPT | Performed by: FAMILY MEDICINE

## 2021-06-29 PROCEDURE — 85027 COMPLETE CBC AUTOMATED: CPT | Performed by: FAMILY MEDICINE

## 2021-06-29 RX ORDER — MESALAMINE 4 G/60ML
4 ENEMA RECTAL
Status: DISCONTINUED | OUTPATIENT
Start: 2021-06-29 | End: 2021-06-30 | Stop reason: HOSPADM

## 2021-06-29 RX ORDER — METHYLPREDNISOLONE SODIUM SUCCINATE 40 MG/ML
20 INJECTION, POWDER, LYOPHILIZED, FOR SOLUTION INTRAMUSCULAR; INTRAVENOUS EVERY 8 HOURS
Status: DISCONTINUED | OUTPATIENT
Start: 2021-06-29 | End: 2021-06-30 | Stop reason: HOSPADM

## 2021-06-29 RX ORDER — PANTOPRAZOLE SODIUM 40 MG/1
40 TABLET, DELAYED RELEASE ORAL
Status: DISCONTINUED | OUTPATIENT
Start: 2021-06-30 | End: 2021-06-30 | Stop reason: HOSPADM

## 2021-06-29 RX ADMIN — MESALAMINE 800 MG: 400 CAPSULE, DELAYED RELEASE ORAL at 22:06

## 2021-06-29 RX ADMIN — PANTOPRAZOLE SODIUM 40 MG: 40 INJECTION, POWDER, FOR SOLUTION INTRAVENOUS at 09:41

## 2021-06-29 RX ADMIN — FERROUS SULFATE TAB 325 MG (65 MG ELEMENTAL FE) 325 MG: 325 (65 FE) TAB at 09:41

## 2021-06-29 RX ADMIN — MESALAMINE 4 G: 4 ENEMA RECTAL at 22:13

## 2021-06-29 RX ADMIN — SODIUM CHLORIDE 100 ML/HR: 0.9 INJECTION, SOLUTION INTRAVENOUS at 05:28

## 2021-06-29 RX ADMIN — MESALAMINE 800 MG: 400 CAPSULE, DELAYED RELEASE ORAL at 17:36

## 2021-06-29 RX ADMIN — MESALAMINE 800 MG: 400 CAPSULE, DELAYED RELEASE ORAL at 09:41

## 2021-06-29 RX ADMIN — SODIUM CHLORIDE 100 ML/HR: 0.9 INJECTION, SOLUTION INTRAVENOUS at 22:12

## 2021-06-29 RX ADMIN — SODIUM CHLORIDE 100 ML/HR: 0.9 INJECTION, SOLUTION INTRAVENOUS at 14:26

## 2021-06-29 RX ADMIN — METHYLPREDNISOLONE SODIUM SUCCINATE 20 MG: 40 INJECTION, POWDER, FOR SOLUTION INTRAMUSCULAR; INTRAVENOUS at 14:26

## 2021-06-29 RX ADMIN — PREDNISONE 40 MG: 20 TABLET ORAL at 09:41

## 2021-06-29 RX ADMIN — METHYLPREDNISOLONE SODIUM SUCCINATE 20 MG: 40 INJECTION, POWDER, FOR SOLUTION INTRAMUSCULAR; INTRAVENOUS at 22:06

## 2021-06-30 VITALS
BODY MASS INDEX: 30.33 KG/M2 | OXYGEN SATURATION: 97 % | HEIGHT: 74 IN | WEIGHT: 236.33 LBS | SYSTOLIC BLOOD PRESSURE: 131 MMHG | RESPIRATION RATE: 19 BRPM | DIASTOLIC BLOOD PRESSURE: 65 MMHG | HEART RATE: 77 BPM | TEMPERATURE: 98.1 F

## 2021-06-30 LAB
CRP SERPL QL: 42.4 MG/L
ERYTHROCYTE [DISTWIDTH] IN BLOOD BY AUTOMATED COUNT: 20.6 % (ref 11.6–15.1)
GAMMA INTERFERON BACKGROUND BLD IA-ACNC: 0.05 IU/ML
HCT VFR BLD AUTO: 30.8 % (ref 36.5–49.3)
HGB BLD-MCNC: 9 G/DL (ref 12–17)
M TB IFN-G BLD-IMP: NEGATIVE
M TB IFN-G CD4+ BCKGRND COR BLD-ACNC: -0.01 IU/ML
M TB IFN-G CD4+ BCKGRND COR BLD-ACNC: -0.02 IU/ML
MCH RBC QN AUTO: 23.6 PG (ref 26.8–34.3)
MCHC RBC AUTO-ENTMCNC: 29.2 G/DL (ref 31.4–37.4)
MCV RBC AUTO: 81 FL (ref 82–98)
MITOGEN IGNF BCKGRD COR BLD-ACNC: 5.51 IU/ML
PLATELET # BLD AUTO: 282 THOUSANDS/UL (ref 149–390)
PMV BLD AUTO: 10.8 FL (ref 8.9–12.7)
RBC # BLD AUTO: 3.82 MILLION/UL (ref 3.88–5.62)
WBC # BLD AUTO: 6.56 THOUSAND/UL (ref 4.31–10.16)

## 2021-06-30 PROCEDURE — 86140 C-REACTIVE PROTEIN: CPT | Performed by: PHYSICIAN ASSISTANT

## 2021-06-30 PROCEDURE — 85027 COMPLETE CBC AUTOMATED: CPT | Performed by: FAMILY MEDICINE

## 2021-06-30 PROCEDURE — NC001 PR NO CHARGE: Performed by: FAMILY MEDICINE

## 2021-06-30 PROCEDURE — 99232 SBSQ HOSP IP/OBS MODERATE 35: CPT | Performed by: INTERNAL MEDICINE

## 2021-06-30 PROCEDURE — 99239 HOSP IP/OBS DSCHRG MGMT >30: CPT | Performed by: FAMILY MEDICINE

## 2021-06-30 RX ORDER — PANTOPRAZOLE SODIUM 40 MG/1
40 TABLET, DELAYED RELEASE ORAL
Qty: 30 TABLET | Refills: 0 | Status: SHIPPED | OUTPATIENT
Start: 2021-07-01 | End: 2021-07-31

## 2021-06-30 RX ORDER — FERROUS SULFATE 325(65) MG
325 TABLET ORAL
Qty: 30 TABLET | Refills: 0 | Status: SHIPPED | OUTPATIENT
Start: 2021-07-01 | End: 2021-08-02 | Stop reason: HOSPADM

## 2021-06-30 RX ORDER — MESALAMINE 400 MG/1
800 CAPSULE, DELAYED RELEASE ORAL 3 TIMES DAILY
Qty: 180 CAPSULE | Refills: 0 | Status: SHIPPED | OUTPATIENT
Start: 2021-06-30 | End: 2021-08-02 | Stop reason: HOSPADM

## 2021-06-30 RX ORDER — PREDNISONE 20 MG/1
40 TABLET ORAL DAILY
Qty: 60 TABLET | Refills: 0 | Status: SHIPPED | OUTPATIENT
Start: 2021-06-30 | End: 2021-08-02 | Stop reason: HOSPADM

## 2021-06-30 RX ORDER — MESALAMINE 4 G/60ML
4 ENEMA RECTAL
Qty: 1800 ML | Refills: 0 | Status: SHIPPED | OUTPATIENT
Start: 2021-06-30 | End: 2021-08-02 | Stop reason: HOSPADM

## 2021-06-30 RX ADMIN — MESALAMINE 800 MG: 400 CAPSULE, DELAYED RELEASE ORAL at 08:18

## 2021-06-30 RX ADMIN — METHYLPREDNISOLONE SODIUM SUCCINATE 20 MG: 40 INJECTION, POWDER, FOR SOLUTION INTRAMUSCULAR; INTRAVENOUS at 15:05

## 2021-06-30 RX ADMIN — MESALAMINE 800 MG: 400 CAPSULE, DELAYED RELEASE ORAL at 15:05

## 2021-06-30 RX ADMIN — SODIUM CHLORIDE 100 ML/HR: 0.9 INJECTION, SOLUTION INTRAVENOUS at 05:47

## 2021-06-30 RX ADMIN — METHYLPREDNISOLONE SODIUM SUCCINATE 20 MG: 40 INJECTION, POWDER, FOR SOLUTION INTRAMUSCULAR; INTRAVENOUS at 05:43

## 2021-06-30 RX ADMIN — FERROUS SULFATE TAB 325 MG (65 MG ELEMENTAL FE) 325 MG: 325 (65 FE) TAB at 08:18

## 2021-06-30 RX ADMIN — PANTOPRAZOLE SODIUM 40 MG: 40 TABLET, DELAYED RELEASE ORAL at 05:43

## 2021-07-01 ENCOUNTER — TELEPHONE (OUTPATIENT)
Dept: OTHER | Facility: HOSPITAL | Age: 21
End: 2021-07-01

## 2021-07-01 ENCOUNTER — TELEPHONE (OUTPATIENT)
Dept: GASTROENTEROLOGY | Facility: CLINIC | Age: 21
End: 2021-07-01

## 2021-07-01 NOTE — TELEPHONE ENCOUNTER
Unfortunately there is not a medical consent form on file that gives me permission to speak with his mom, so I can not call her directly

## 2021-07-01 NOTE — TELEPHONE ENCOUNTER
Please call patient's mother and let her know that he should have a follow-up more locally until he sees Dr Fer Christiansen in 1 month  Thank you!    ----- Message from Emily Glez sent at 6/30/2021  3:43 PM EDT -----  Yes, I will have the staff get him scheduled  ----- Message -----  From: Susan Carr PA-C  Sent: 6/30/2021   1:20 PM EDT  To: Krys Darling! Please have patient follow-up with Dr Renate Lynn in 1-2 weeks  He is a young man with new diagnosis of moderately severe Ulcerative Colitis  Thank you!

## 2021-07-01 NOTE — TELEPHONE ENCOUNTER
Spoke with patient and he said he has an appointment scheduled already with a GI doctor in [facility]  He is not sure why he would need to see us too  Will discuss with Chicho Bridges when she is in the office

## 2021-07-01 NOTE — TELEPHONE ENCOUNTER
----- Message from Akilah Izquierdo sent at 6/30/2021  3:43 PM EDT -----  Hi,   Can you please reach out to schedule this patient? Thanks Lena Wu  ----- Message -----  From: Radha Glasgow PA-C  Sent: 6/30/2021   1:20 PM EDT  To: Tay Franco! Please have patient follow-up with Dr Juliette Tapia in 1-2 weeks  He is a young man with new diagnosis of moderately severe Ulcerative Colitis  Thank you!

## 2021-07-02 ENCOUNTER — APPOINTMENT (EMERGENCY)
Dept: CT IMAGING | Facility: HOSPITAL | Age: 21
DRG: 245 | End: 2021-07-02
Payer: COMMERCIAL

## 2021-07-02 ENCOUNTER — HOSPITAL ENCOUNTER (INPATIENT)
Facility: HOSPITAL | Age: 21
LOS: 12 days | DRG: 245 | End: 2021-07-15
Attending: EMERGENCY MEDICINE | Admitting: FAMILY MEDICINE
Payer: COMMERCIAL

## 2021-07-02 ENCOUNTER — TELEPHONE (OUTPATIENT)
Dept: GASTROENTEROLOGY | Facility: CLINIC | Age: 21
End: 2021-07-02

## 2021-07-02 ENCOUNTER — APPOINTMENT (EMERGENCY)
Dept: RADIOLOGY | Facility: HOSPITAL | Age: 21
DRG: 245 | End: 2021-07-02
Payer: COMMERCIAL

## 2021-07-02 DIAGNOSIS — Z71.89 COMPLEX CARE COORDINATION: Primary | ICD-10-CM

## 2021-07-02 DIAGNOSIS — D72.829 LEUKOCYTOSIS: ICD-10-CM

## 2021-07-02 DIAGNOSIS — K51.911 ULCERATIVE COLITIS WITH RECTAL BLEEDING, UNSPECIFIED LOCATION (HCC): ICD-10-CM

## 2021-07-02 DIAGNOSIS — R55 SYNCOPE: ICD-10-CM

## 2021-07-02 DIAGNOSIS — R55 SYNCOPE, UNSPECIFIED SYNCOPE TYPE: Primary | ICD-10-CM

## 2021-07-02 DIAGNOSIS — K51.911 ULCERATIVE COLITIS WITH RECTAL BLEEDING (HCC): ICD-10-CM

## 2021-07-02 PROBLEM — D50.9 IRON DEFICIENCY ANEMIA: Status: ACTIVE | Noted: 2021-06-22

## 2021-07-02 LAB
ABO GROUP BLD: NORMAL
ABO GROUP BLD: NORMAL
ALBUMIN SERPL BCP-MCNC: 2.8 G/DL (ref 3.5–5)
ALP SERPL-CCNC: 63 U/L (ref 46–116)
ALT SERPL W P-5'-P-CCNC: 51 U/L (ref 12–78)
AMORPH PHOS CRY URNS QL MICRO: NORMAL /HPF
ANION GAP SERPL CALCULATED.3IONS-SCNC: 11 MMOL/L (ref 4–13)
APTT PPP: 28 SECONDS (ref 23–37)
AST SERPL W P-5'-P-CCNC: 17 U/L (ref 5–45)
BACTERIA UR QL AUTO: NORMAL /HPF
BASOPHILS # BLD MANUAL: 0.27 THOUSAND/UL (ref 0–0.1)
BASOPHILS NFR MAR MANUAL: 2 % (ref 0–1)
BILIRUB DIRECT SERPL-MCNC: 0.19 MG/DL (ref 0–0.2)
BILIRUB SERPL-MCNC: 0.75 MG/DL (ref 0.2–1)
BILIRUB UR QL STRIP: NEGATIVE
BLD GP AB SCN SERPL QL: NEGATIVE
BUN SERPL-MCNC: 16 MG/DL (ref 5–25)
CALCIUM SERPL-MCNC: 9 MG/DL (ref 8.3–10.1)
CHLORIDE SERPL-SCNC: 98 MMOL/L (ref 100–108)
CLARITY UR: ABNORMAL
CO2 SERPL-SCNC: 27 MMOL/L (ref 21–32)
COLOR UR: YELLOW
CREAT SERPL-MCNC: 1.13 MG/DL (ref 0.6–1.3)
CRP SERPL QL: 158.8 MG/L
EOSINOPHIL # BLD MANUAL: 0.27 THOUSAND/UL (ref 0–0.4)
EOSINOPHIL NFR BLD MANUAL: 2 % (ref 0–6)
ERYTHROCYTE [DISTWIDTH] IN BLOOD BY AUTOMATED COUNT: 20.4 % (ref 11.6–15.1)
GFR SERPL CREATININE-BSD FRML MDRD: 92 ML/MIN/1.73SQ M
GLUCOSE SERPL-MCNC: 122 MG/DL (ref 65–140)
GLUCOSE UR STRIP-MCNC: NEGATIVE MG/DL
HCT VFR BLD AUTO: 31.9 % (ref 36.5–49.3)
HGB BLD-MCNC: 9.5 G/DL (ref 12–17)
HGB UR QL STRIP.AUTO: NEGATIVE
HYPERCHROMIA BLD QL SMEAR: PRESENT
INR PPP: 1.27 (ref 0.84–1.19)
KETONES UR STRIP-MCNC: NEGATIVE MG/DL
LACTATE SERPL-SCNC: 1.9 MMOL/L (ref 0.5–2)
LEUKOCYTE ESTERASE UR QL STRIP: NEGATIVE
LIPASE SERPL-CCNC: 59 U/L (ref 73–393)
LYMPHOCYTES # BLD AUTO: 33 % (ref 14–44)
LYMPHOCYTES # BLD AUTO: 4.42 THOUSAND/UL (ref 0.6–4.47)
MAGNESIUM SERPL-MCNC: 2.2 MG/DL (ref 1.6–2.6)
MCH RBC QN AUTO: 23.3 PG (ref 26.8–34.3)
MCHC RBC AUTO-ENTMCNC: 29.8 G/DL (ref 31.4–37.4)
MCV RBC AUTO: 78 FL (ref 82–98)
METAMYELOCYTES NFR BLD MANUAL: 2 % (ref 0–1)
MONOCYTES # BLD AUTO: 1.87 THOUSAND/UL (ref 0–1.22)
MONOCYTES NFR BLD: 14 % (ref 4–12)
NEUTROPHILS # BLD MANUAL: 6.16 THOUSAND/UL (ref 1.85–7.62)
NEUTS BAND NFR BLD MANUAL: 21 % (ref 0–8)
NEUTS SEG NFR BLD AUTO: 25 % (ref 43–75)
NITRITE UR QL STRIP: NEGATIVE
NON-SQ EPI CELLS URNS QL MICRO: NORMAL /HPF
NRBC BLD AUTO-RTO: 0 /100 WBCS
PH UR STRIP.AUTO: 7.5 [PH]
PLATELET # BLD AUTO: 392 THOUSANDS/UL (ref 149–390)
PLATELET BLD QL SMEAR: ADEQUATE
PMV BLD AUTO: 11.3 FL (ref 8.9–12.7)
POLYCHROMASIA BLD QL SMEAR: PRESENT
POTASSIUM SERPL-SCNC: 3.5 MMOL/L (ref 3.5–5.3)
PROCALCITONIN SERPL-MCNC: 0.06 NG/ML
PROT SERPL-MCNC: 7.1 G/DL (ref 6.4–8.2)
PROT UR STRIP-MCNC: ABNORMAL MG/DL
PROTHROMBIN TIME: 15.3 SECONDS (ref 11.6–14.5)
RBC # BLD AUTO: 4.07 MILLION/UL (ref 3.88–5.62)
RBC #/AREA URNS AUTO: NORMAL /HPF
RH BLD: POSITIVE
RH BLD: POSITIVE
SODIUM SERPL-SCNC: 136 MMOL/L (ref 136–145)
SP GR UR STRIP.AUTO: 1.01 (ref 1–1.03)
SPECIMEN EXPIRATION DATE: NORMAL
TOTAL CELLS COUNTED SPEC: 100
TROPONIN I SERPL-MCNC: <0.02 NG/ML
UROBILINOGEN UR QL STRIP.AUTO: 1 E.U./DL
VARIANT LYMPHS # BLD AUTO: 1 %
WBC # BLD AUTO: 13.39 THOUSAND/UL (ref 4.31–10.16)
WBC #/AREA URNS AUTO: NORMAL /HPF

## 2021-07-02 PROCEDURE — 96360 HYDRATION IV INFUSION INIT: CPT

## 2021-07-02 PROCEDURE — 87505 NFCT AGENT DETECTION GI: CPT | Performed by: STUDENT IN AN ORGANIZED HEALTH CARE EDUCATION/TRAINING PROGRAM

## 2021-07-02 PROCEDURE — 86920 COMPATIBILITY TEST SPIN: CPT

## 2021-07-02 PROCEDURE — 74178 CT ABD&PLV WO CNTR FLWD CNTR: CPT

## 2021-07-02 PROCEDURE — 87209 SMEAR COMPLEX STAIN: CPT | Performed by: STUDENT IN AN ORGANIZED HEALTH CARE EDUCATION/TRAINING PROGRAM

## 2021-07-02 PROCEDURE — 87177 OVA AND PARASITES SMEARS: CPT | Performed by: STUDENT IN AN ORGANIZED HEALTH CARE EDUCATION/TRAINING PROGRAM

## 2021-07-02 PROCEDURE — 86140 C-REACTIVE PROTEIN: CPT | Performed by: STUDENT IN AN ORGANIZED HEALTH CARE EDUCATION/TRAINING PROGRAM

## 2021-07-02 PROCEDURE — 85610 PROTHROMBIN TIME: CPT | Performed by: EMERGENCY MEDICINE

## 2021-07-02 PROCEDURE — 83605 ASSAY OF LACTIC ACID: CPT | Performed by: EMERGENCY MEDICINE

## 2021-07-02 PROCEDURE — 83735 ASSAY OF MAGNESIUM: CPT | Performed by: EMERGENCY MEDICINE

## 2021-07-02 PROCEDURE — 86901 BLOOD TYPING SEROLOGIC RH(D): CPT | Performed by: EMERGENCY MEDICINE

## 2021-07-02 PROCEDURE — 86850 RBC ANTIBODY SCREEN: CPT | Performed by: EMERGENCY MEDICINE

## 2021-07-02 PROCEDURE — 36415 COLL VENOUS BLD VENIPUNCTURE: CPT | Performed by: EMERGENCY MEDICINE

## 2021-07-02 PROCEDURE — 87040 BLOOD CULTURE FOR BACTERIA: CPT | Performed by: EMERGENCY MEDICINE

## 2021-07-02 PROCEDURE — 85027 COMPLETE CBC AUTOMATED: CPT | Performed by: EMERGENCY MEDICINE

## 2021-07-02 PROCEDURE — 85007 BL SMEAR W/DIFF WBC COUNT: CPT | Performed by: EMERGENCY MEDICINE

## 2021-07-02 PROCEDURE — 86900 BLOOD TYPING SEROLOGIC ABO: CPT | Performed by: EMERGENCY MEDICINE

## 2021-07-02 PROCEDURE — 71045 X-RAY EXAM CHEST 1 VIEW: CPT

## 2021-07-02 PROCEDURE — 81001 URINALYSIS AUTO W/SCOPE: CPT | Performed by: EMERGENCY MEDICINE

## 2021-07-02 PROCEDURE — 85730 THROMBOPLASTIN TIME PARTIAL: CPT | Performed by: EMERGENCY MEDICINE

## 2021-07-02 PROCEDURE — 99285 EMERGENCY DEPT VISIT HI MDM: CPT

## 2021-07-02 PROCEDURE — 80076 HEPATIC FUNCTION PANEL: CPT | Performed by: EMERGENCY MEDICINE

## 2021-07-02 PROCEDURE — 93005 ELECTROCARDIOGRAM TRACING: CPT

## 2021-07-02 PROCEDURE — 83690 ASSAY OF LIPASE: CPT | Performed by: EMERGENCY MEDICINE

## 2021-07-02 PROCEDURE — 84484 ASSAY OF TROPONIN QUANT: CPT | Performed by: EMERGENCY MEDICINE

## 2021-07-02 PROCEDURE — 99220 PR INITIAL OBSERVATION CARE/DAY 70 MINUTES: CPT | Performed by: STUDENT IN AN ORGANIZED HEALTH CARE EDUCATION/TRAINING PROGRAM

## 2021-07-02 PROCEDURE — 84145 PROCALCITONIN (PCT): CPT | Performed by: EMERGENCY MEDICINE

## 2021-07-02 PROCEDURE — 80048 BASIC METABOLIC PNL TOTAL CA: CPT | Performed by: EMERGENCY MEDICINE

## 2021-07-02 PROCEDURE — 99285 EMERGENCY DEPT VISIT HI MDM: CPT | Performed by: EMERGENCY MEDICINE

## 2021-07-02 RX ORDER — PANTOPRAZOLE SODIUM 40 MG/1
40 TABLET, DELAYED RELEASE ORAL
Status: DISCONTINUED | OUTPATIENT
Start: 2021-07-03 | End: 2021-07-15 | Stop reason: HOSPADM

## 2021-07-02 RX ORDER — DICYCLOMINE HCL 20 MG
20 TABLET ORAL ONCE
Status: COMPLETED | OUTPATIENT
Start: 2021-07-02 | End: 2021-07-02

## 2021-07-02 RX ORDER — MESALAMINE 4 G/60ML
4 ENEMA RECTAL
Status: DISCONTINUED | OUTPATIENT
Start: 2021-07-02 | End: 2021-07-14

## 2021-07-02 RX ORDER — METHYLPREDNISOLONE SODIUM SUCCINATE 40 MG/ML
20 INJECTION, POWDER, LYOPHILIZED, FOR SOLUTION INTRAMUSCULAR; INTRAVENOUS EVERY 8 HOURS SCHEDULED
Status: DISCONTINUED | OUTPATIENT
Start: 2021-07-02 | End: 2021-07-02

## 2021-07-02 RX ORDER — MESALAMINE 400 MG/1
800 CAPSULE, DELAYED RELEASE ORAL 3 TIMES DAILY
Status: DISCONTINUED | OUTPATIENT
Start: 2021-07-02 | End: 2021-07-14

## 2021-07-02 RX ORDER — SODIUM CHLORIDE, SODIUM LACTATE, POTASSIUM CHLORIDE, CALCIUM CHLORIDE 600; 310; 30; 20 MG/100ML; MG/100ML; MG/100ML; MG/100ML
100 INJECTION, SOLUTION INTRAVENOUS CONTINUOUS
Status: DISCONTINUED | OUTPATIENT
Start: 2021-07-02 | End: 2021-07-05

## 2021-07-02 RX ORDER — FERROUS SULFATE 325(65) MG
325 TABLET ORAL
Status: DISCONTINUED | OUTPATIENT
Start: 2021-07-03 | End: 2021-07-15 | Stop reason: HOSPADM

## 2021-07-02 RX ORDER — METHYLPREDNISOLONE SODIUM SUCCINATE 40 MG/ML
20 INJECTION, POWDER, LYOPHILIZED, FOR SOLUTION INTRAMUSCULAR; INTRAVENOUS EVERY 8 HOURS SCHEDULED
Status: DISCONTINUED | OUTPATIENT
Start: 2021-07-02 | End: 2021-07-05

## 2021-07-02 RX ORDER — SACCHAROMYCES BOULARDII 250 MG
250 CAPSULE ORAL 2 TIMES DAILY
Status: DISCONTINUED | OUTPATIENT
Start: 2021-07-02 | End: 2021-07-15 | Stop reason: HOSPADM

## 2021-07-02 RX ADMIN — SODIUM CHLORIDE 1000 ML: 0.9 INJECTION, SOLUTION INTRAVENOUS at 13:30

## 2021-07-02 RX ADMIN — IOHEXOL 100 ML: 350 INJECTION, SOLUTION INTRAVENOUS at 14:14

## 2021-07-02 RX ADMIN — METHYLPREDNISOLONE SODIUM SUCCINATE 20 MG: 40 INJECTION, POWDER, FOR SOLUTION INTRAMUSCULAR; INTRAVENOUS at 15:54

## 2021-07-02 RX ADMIN — METHYLPREDNISOLONE SODIUM SUCCINATE 20 MG: 40 INJECTION, POWDER, FOR SOLUTION INTRAMUSCULAR; INTRAVENOUS at 21:32

## 2021-07-02 RX ADMIN — DICYCLOMINE HYDROCHLORIDE 20 MG: 20 TABLET ORAL at 15:55

## 2021-07-02 RX ADMIN — CEFTRIAXONE SODIUM 1000 MG: 10 INJECTION, POWDER, FOR SOLUTION INTRAVENOUS at 17:56

## 2021-07-02 RX ADMIN — MESALAMINE 800 MG: 400 CAPSULE, DELAYED RELEASE ORAL at 17:56

## 2021-07-02 RX ADMIN — MESALAMINE 4 G: 4 ENEMA RECTAL at 21:31

## 2021-07-02 RX ADMIN — SODIUM CHLORIDE, SODIUM LACTATE, POTASSIUM CHLORIDE, AND CALCIUM CHLORIDE 100 ML/HR: .6; .31; .03; .02 INJECTION, SOLUTION INTRAVENOUS at 18:00

## 2021-07-02 RX ADMIN — Medication 250 MG: at 17:56

## 2021-07-02 RX ADMIN — METRONIDAZOLE 500 MG: 500 INJECTION, SOLUTION INTRAVENOUS at 17:56

## 2021-07-02 NOTE — TELEPHONE ENCOUNTER
Dr Jose Merrill - patient's mother called  Patient is experiencing pain and is light headed  Patient was seen by Dr Jose Merrill in the hospital and had colonoscopy and DX with Colitis   Patient's mother wants to know if patient should go to hospital or is this normal  Please call Silas Hernandez at [phone] ty

## 2021-07-02 NOTE — TELEPHONE ENCOUNTER
Spoke with patient's mother he is the [facility] ER he passed out while having blood work done at the hospital was taken over to the ER they did a CT Scan and were giving him fluids

## 2021-07-03 LAB
ANION GAP SERPL CALCULATED.3IONS-SCNC: 8 MMOL/L (ref 4–13)
ANISOCYTOSIS BLD QL SMEAR: PRESENT
BASOPHILS # BLD MANUAL: 0 THOUSAND/UL (ref 0–0.1)
BASOPHILS NFR MAR MANUAL: 0 % (ref 0–1)
BUN SERPL-MCNC: 13 MG/DL (ref 5–25)
C DIFF TOX B TCDB STL QL NAA+PROBE: NEGATIVE
CALCIUM SERPL-MCNC: 8.8 MG/DL (ref 8.3–10.1)
CAMPYLOBACTER DNA SPEC NAA+PROBE: NORMAL
CHLORIDE SERPL-SCNC: 101 MMOL/L (ref 100–108)
CO2 SERPL-SCNC: 27 MMOL/L (ref 21–32)
CREAT SERPL-MCNC: 0.81 MG/DL (ref 0.6–1.3)
DOHLE BOD BLD QL SMEAR: PRESENT
EOSINOPHIL # BLD MANUAL: 0 THOUSAND/UL (ref 0–0.4)
EOSINOPHIL NFR BLD MANUAL: 0 % (ref 0–6)
ERYTHROCYTE [DISTWIDTH] IN BLOOD BY AUTOMATED COUNT: 19.9 % (ref 11.6–15.1)
GFR SERPL CREATININE-BSD FRML MDRD: 127 ML/MIN/1.73SQ M
GLUCOSE P FAST SERPL-MCNC: 108 MG/DL (ref 65–99)
GLUCOSE SERPL-MCNC: 108 MG/DL (ref 65–140)
HCT VFR BLD AUTO: 19.4 % (ref 36.5–49.3)
HCT VFR BLD AUTO: 25.2 % (ref 36.5–49.3)
HGB BLD-MCNC: 5.8 G/DL (ref 12–17)
HGB BLD-MCNC: 7.5 G/DL (ref 12–17)
HYPERCHROMIA BLD QL SMEAR: PRESENT
LYMPHOCYTES # BLD AUTO: 1.48 THOUSAND/UL (ref 0.6–4.47)
LYMPHOCYTES # BLD AUTO: 23 % (ref 14–44)
MCH RBC QN AUTO: 23.7 PG (ref 26.8–34.3)
MCHC RBC AUTO-ENTMCNC: 29.8 G/DL (ref 31.4–37.4)
MCV RBC AUTO: 80 FL (ref 82–98)
MONOCYTES # BLD AUTO: 0.52 THOUSAND/UL (ref 0–1.22)
MONOCYTES NFR BLD: 8 % (ref 4–12)
NEUTROPHILS # BLD MANUAL: 4.44 THOUSAND/UL (ref 1.85–7.62)
NEUTS BAND NFR BLD MANUAL: 19 % (ref 0–8)
NEUTS SEG NFR BLD AUTO: 50 % (ref 43–75)
NRBC BLD AUTO-RTO: 0 /100 WBCS
PLATELET # BLD AUTO: 286 THOUSANDS/UL (ref 149–390)
PLATELET BLD QL SMEAR: ADEQUATE
PMV BLD AUTO: 11.4 FL (ref 8.9–12.7)
POLYCHROMASIA BLD QL SMEAR: PRESENT
POTASSIUM SERPL-SCNC: 4.2 MMOL/L (ref 3.5–5.3)
PROCALCITONIN SERPL-MCNC: 0.11 NG/ML
RBC # BLD AUTO: 3.17 MILLION/UL (ref 3.88–5.62)
SALMONELLA DNA SPEC QL NAA+PROBE: NORMAL
SHIGA TOXIN STX GENE SPEC NAA+PROBE: NORMAL
SHIGELLA DNA SPEC QL NAA+PROBE: NORMAL
SODIUM SERPL-SCNC: 136 MMOL/L (ref 136–145)
TOTAL CELLS COUNTED SPEC: 100
TOXIC GRANULES BLD QL SMEAR: PRESENT
WBC # BLD AUTO: 6.44 THOUSAND/UL (ref 4.31–10.16)

## 2021-07-03 PROCEDURE — P9016 RBC LEUKOCYTES REDUCED: HCPCS

## 2021-07-03 PROCEDURE — 85027 COMPLETE CBC AUTOMATED: CPT | Performed by: STUDENT IN AN ORGANIZED HEALTH CARE EDUCATION/TRAINING PROGRAM

## 2021-07-03 PROCEDURE — 85014 HEMATOCRIT: CPT | Performed by: PHYSICIAN ASSISTANT

## 2021-07-03 PROCEDURE — 84145 PROCALCITONIN (PCT): CPT | Performed by: EMERGENCY MEDICINE

## 2021-07-03 PROCEDURE — 80048 BASIC METABOLIC PNL TOTAL CA: CPT | Performed by: STUDENT IN AN ORGANIZED HEALTH CARE EDUCATION/TRAINING PROGRAM

## 2021-07-03 PROCEDURE — 99254 IP/OBS CNSLTJ NEW/EST MOD 60: CPT | Performed by: INTERNAL MEDICINE

## 2021-07-03 PROCEDURE — 85007 BL SMEAR W/DIFF WBC COUNT: CPT | Performed by: STUDENT IN AN ORGANIZED HEALTH CARE EDUCATION/TRAINING PROGRAM

## 2021-07-03 PROCEDURE — 85018 HEMOGLOBIN: CPT | Performed by: PHYSICIAN ASSISTANT

## 2021-07-03 PROCEDURE — 99232 SBSQ HOSP IP/OBS MODERATE 35: CPT | Performed by: PHYSICIAN ASSISTANT

## 2021-07-03 PROCEDURE — 30233N1 TRANSFUSION OF NONAUTOLOGOUS RED BLOOD CELLS INTO PERIPHERAL VEIN, PERCUTANEOUS APPROACH: ICD-10-PCS | Performed by: FAMILY MEDICINE

## 2021-07-03 PROCEDURE — 87493 C DIFF AMPLIFIED PROBE: CPT | Performed by: STUDENT IN AN ORGANIZED HEALTH CARE EDUCATION/TRAINING PROGRAM

## 2021-07-03 RX ORDER — ACETAMINOPHEN 325 MG/1
650 TABLET ORAL EVERY 4 HOURS PRN
Status: DISCONTINUED | OUTPATIENT
Start: 2021-07-03 | End: 2021-07-15 | Stop reason: HOSPADM

## 2021-07-03 RX ADMIN — METHYLPREDNISOLONE SODIUM SUCCINATE 20 MG: 40 INJECTION, POWDER, FOR SOLUTION INTRAMUSCULAR; INTRAVENOUS at 13:41

## 2021-07-03 RX ADMIN — MESALAMINE 800 MG: 400 CAPSULE, DELAYED RELEASE ORAL at 00:20

## 2021-07-03 RX ADMIN — MESALAMINE 800 MG: 400 CAPSULE, DELAYED RELEASE ORAL at 23:07

## 2021-07-03 RX ADMIN — MESALAMINE 800 MG: 400 CAPSULE, DELAYED RELEASE ORAL at 17:46

## 2021-07-03 RX ADMIN — SODIUM CHLORIDE, SODIUM LACTATE, POTASSIUM CHLORIDE, AND CALCIUM CHLORIDE 100 ML/HR: .6; .31; .03; .02 INJECTION, SOLUTION INTRAVENOUS at 05:50

## 2021-07-03 RX ADMIN — METRONIDAZOLE 500 MG: 500 INJECTION, SOLUTION INTRAVENOUS at 17:47

## 2021-07-03 RX ADMIN — METHYLPREDNISOLONE SODIUM SUCCINATE 20 MG: 40 INJECTION, POWDER, FOR SOLUTION INTRAMUSCULAR; INTRAVENOUS at 23:07

## 2021-07-03 RX ADMIN — SODIUM CHLORIDE, SODIUM LACTATE, POTASSIUM CHLORIDE, AND CALCIUM CHLORIDE 100 ML/HR: .6; .31; .03; .02 INJECTION, SOLUTION INTRAVENOUS at 23:08

## 2021-07-03 RX ADMIN — CEFTRIAXONE SODIUM 1000 MG: 10 INJECTION, POWDER, FOR SOLUTION INTRAVENOUS at 23:07

## 2021-07-03 RX ADMIN — Medication 250 MG: at 17:46

## 2021-07-03 RX ADMIN — METRONIDAZOLE 500 MG: 500 INJECTION, SOLUTION INTRAVENOUS at 00:20

## 2021-07-03 RX ADMIN — ACETAMINOPHEN 650 MG: 325 TABLET, FILM COATED ORAL at 13:41

## 2021-07-03 RX ADMIN — METRONIDAZOLE 500 MG: 500 INJECTION, SOLUTION INTRAVENOUS at 09:19

## 2021-07-03 RX ADMIN — SODIUM CHLORIDE, SODIUM LACTATE, POTASSIUM CHLORIDE, AND CALCIUM CHLORIDE 100 ML/HR: .6; .31; .03; .02 INJECTION, SOLUTION INTRAVENOUS at 16:17

## 2021-07-03 RX ADMIN — FERROUS SULFATE TAB 325 MG (65 MG ELEMENTAL FE) 325 MG: 325 (65 FE) TAB at 09:17

## 2021-07-03 RX ADMIN — METHYLPREDNISOLONE SODIUM SUCCINATE 20 MG: 40 INJECTION, POWDER, FOR SOLUTION INTRAMUSCULAR; INTRAVENOUS at 05:47

## 2021-07-03 RX ADMIN — MESALAMINE 800 MG: 400 CAPSULE, DELAYED RELEASE ORAL at 09:17

## 2021-07-03 RX ADMIN — PANTOPRAZOLE SODIUM 40 MG: 40 TABLET, DELAYED RELEASE ORAL at 05:47

## 2021-07-03 RX ADMIN — MESALAMINE 4 G: 4 ENEMA RECTAL at 23:15

## 2021-07-03 RX ADMIN — Medication 250 MG: at 09:17

## 2021-07-04 LAB
ABO GROUP BLD BPU: NORMAL
ABO GROUP BLD BPU: NORMAL
ANION GAP SERPL CALCULATED.3IONS-SCNC: 5 MMOL/L (ref 4–13)
BASOPHILS # BLD MANUAL: 0 THOUSAND/UL (ref 0–0.1)
BASOPHILS NFR MAR MANUAL: 0 % (ref 0–1)
BPU ID: NORMAL
BPU ID: NORMAL
BUN SERPL-MCNC: 13 MG/DL (ref 5–25)
CALCIUM SERPL-MCNC: 8.6 MG/DL (ref 8.3–10.1)
CHLORIDE SERPL-SCNC: 102 MMOL/L (ref 100–108)
CO2 SERPL-SCNC: 29 MMOL/L (ref 21–32)
CREAT SERPL-MCNC: 0.78 MG/DL (ref 0.6–1.3)
CROSSMATCH: NORMAL
CROSSMATCH: NORMAL
EOSINOPHIL # BLD MANUAL: 0 THOUSAND/UL (ref 0–0.4)
EOSINOPHIL NFR BLD MANUAL: 0 % (ref 0–6)
ERYTHROCYTE [DISTWIDTH] IN BLOOD BY AUTOMATED COUNT: 19.3 % (ref 11.6–15.1)
GFR SERPL CREATININE-BSD FRML MDRD: 129 ML/MIN/1.73SQ M
GLUCOSE SERPL-MCNC: 123 MG/DL (ref 65–140)
HCT VFR BLD AUTO: 30.2 % (ref 36.5–49.3)
HCT VFR BLD AUTO: 30.5 % (ref 36.5–49.3)
HGB BLD-MCNC: 9.3 G/DL (ref 12–17)
HGB BLD-MCNC: 9.5 G/DL (ref 12–17)
HYPERCHROMIA BLD QL SMEAR: PRESENT
LYMPHOCYTES # BLD AUTO: 0.94 THOUSAND/UL (ref 0.6–4.47)
LYMPHOCYTES # BLD AUTO: 16 % (ref 14–44)
MCH RBC QN AUTO: 25.1 PG (ref 26.8–34.3)
MCHC RBC AUTO-ENTMCNC: 31.1 G/DL (ref 31.4–37.4)
MCV RBC AUTO: 81 FL (ref 82–98)
MONOCYTES # BLD AUTO: 0.47 THOUSAND/UL (ref 0–1.22)
MONOCYTES NFR BLD: 8 % (ref 4–12)
NEUTROPHILS # BLD MANUAL: 4.48 THOUSAND/UL (ref 1.85–7.62)
NEUTS BAND NFR BLD MANUAL: 5 % (ref 0–8)
NEUTS SEG NFR BLD AUTO: 71 % (ref 43–75)
NRBC BLD AUTO-RTO: 0 /100 WBCS
PLATELET # BLD AUTO: 250 THOUSANDS/UL (ref 149–390)
PLATELET BLD QL SMEAR: ADEQUATE
PMV BLD AUTO: 11.5 FL (ref 8.9–12.7)
POTASSIUM SERPL-SCNC: 4.1 MMOL/L (ref 3.5–5.3)
RBC # BLD AUTO: 3.79 MILLION/UL (ref 3.88–5.62)
SODIUM SERPL-SCNC: 136 MMOL/L (ref 136–145)
TOTAL CELLS COUNTED SPEC: 100
UNIT DISPENSE STATUS: NORMAL
UNIT DISPENSE STATUS: NORMAL
UNIT PRODUCT CODE: NORMAL
UNIT PRODUCT CODE: NORMAL
UNIT RH: NORMAL
UNIT RH: NORMAL
WBC # BLD AUTO: 5.9 THOUSAND/UL (ref 4.31–10.16)

## 2021-07-04 PROCEDURE — 85027 COMPLETE CBC AUTOMATED: CPT | Performed by: PHYSICIAN ASSISTANT

## 2021-07-04 PROCEDURE — 99231 SBSQ HOSP IP/OBS SF/LOW 25: CPT | Performed by: PHYSICIAN ASSISTANT

## 2021-07-04 PROCEDURE — 85014 HEMATOCRIT: CPT | Performed by: PHYSICIAN ASSISTANT

## 2021-07-04 PROCEDURE — 85018 HEMOGLOBIN: CPT | Performed by: PHYSICIAN ASSISTANT

## 2021-07-04 PROCEDURE — 85007 BL SMEAR W/DIFF WBC COUNT: CPT | Performed by: PHYSICIAN ASSISTANT

## 2021-07-04 PROCEDURE — 99232 SBSQ HOSP IP/OBS MODERATE 35: CPT | Performed by: INTERNAL MEDICINE

## 2021-07-04 PROCEDURE — 80048 BASIC METABOLIC PNL TOTAL CA: CPT | Performed by: PHYSICIAN ASSISTANT

## 2021-07-04 RX ORDER — DIPHENHYDRAMINE HCL 25 MG
25 TABLET ORAL ONCE
Status: CANCELLED | OUTPATIENT
Start: 2021-07-04

## 2021-07-04 RX ORDER — DIPHENHYDRAMINE HCL 25 MG
25 TABLET ORAL ONCE
Status: COMPLETED | OUTPATIENT
Start: 2021-07-04 | End: 2021-07-04

## 2021-07-04 RX ORDER — METHYLPREDNISOLONE SODIUM SUCCINATE 40 MG/ML
40 INJECTION, POWDER, LYOPHILIZED, FOR SOLUTION INTRAMUSCULAR; INTRAVENOUS ONCE
Status: CANCELLED | OUTPATIENT
Start: 2021-07-04

## 2021-07-04 RX ORDER — ACETAMINOPHEN 325 MG/1
650 TABLET ORAL ONCE
Status: DISCONTINUED | OUTPATIENT
Start: 2021-07-04 | End: 2021-07-15 | Stop reason: HOSPADM

## 2021-07-04 RX ORDER — SODIUM CHLORIDE 9 MG/ML
20 INJECTION, SOLUTION INTRAVENOUS ONCE
Status: CANCELLED | OUTPATIENT
Start: 2021-07-04

## 2021-07-04 RX ORDER — ACETAMINOPHEN 325 MG/1
650 TABLET ORAL ONCE
Status: CANCELLED | OUTPATIENT
Start: 2021-07-04

## 2021-07-04 RX ORDER — ACETAMINOPHEN 325 MG/1
650 TABLET ORAL ONCE
Status: DISCONTINUED | OUTPATIENT
Start: 2021-07-04 | End: 2021-07-04

## 2021-07-04 RX ORDER — DIPHENHYDRAMINE HCL 25 MG
25 TABLET ORAL ONCE
Status: DISCONTINUED | OUTPATIENT
Start: 2021-07-04 | End: 2021-07-04

## 2021-07-04 RX ADMIN — MESALAMINE 4 G: 4 ENEMA RECTAL at 23:13

## 2021-07-04 RX ADMIN — METRONIDAZOLE 500 MG: 500 INJECTION, SOLUTION INTRAVENOUS at 00:39

## 2021-07-04 RX ADMIN — METRONIDAZOLE 500 MG: 500 INJECTION, SOLUTION INTRAVENOUS at 08:25

## 2021-07-04 RX ADMIN — ACETAMINOPHEN 650 MG: 325 TABLET, FILM COATED ORAL at 12:38

## 2021-07-04 RX ADMIN — DIPHENHYDRAMINE HYDROCHLORIDE 25 MG: 25 TABLET ORAL at 12:37

## 2021-07-04 RX ADMIN — SODIUM CHLORIDE, SODIUM LACTATE, POTASSIUM CHLORIDE, AND CALCIUM CHLORIDE 100 ML/HR: .6; .31; .03; .02 INJECTION, SOLUTION INTRAVENOUS at 22:27

## 2021-07-04 RX ADMIN — Medication 250 MG: at 08:25

## 2021-07-04 RX ADMIN — METHYLPREDNISOLONE SODIUM SUCCINATE 20 MG: 40 INJECTION, POWDER, FOR SOLUTION INTRAMUSCULAR; INTRAVENOUS at 21:51

## 2021-07-04 RX ADMIN — MESALAMINE 800 MG: 400 CAPSULE, DELAYED RELEASE ORAL at 17:03

## 2021-07-04 RX ADMIN — METHYLPREDNISOLONE SODIUM SUCCINATE 20 MG: 40 INJECTION, POWDER, FOR SOLUTION INTRAMUSCULAR; INTRAVENOUS at 12:39

## 2021-07-04 RX ADMIN — MESALAMINE 800 MG: 400 CAPSULE, DELAYED RELEASE ORAL at 08:25

## 2021-07-04 RX ADMIN — INFLIXIMAB 500 MG: 100 INJECTION, POWDER, LYOPHILIZED, FOR SOLUTION INTRAVENOUS at 13:49

## 2021-07-04 RX ADMIN — METRONIDAZOLE 500 MG: 500 INJECTION, SOLUTION INTRAVENOUS at 17:47

## 2021-07-04 RX ADMIN — CEFTRIAXONE SODIUM 1000 MG: 10 INJECTION, POWDER, FOR SOLUTION INTRAVENOUS at 17:03

## 2021-07-04 RX ADMIN — PANTOPRAZOLE SODIUM 40 MG: 40 TABLET, DELAYED RELEASE ORAL at 05:11

## 2021-07-04 RX ADMIN — MESALAMINE 800 MG: 400 CAPSULE, DELAYED RELEASE ORAL at 21:52

## 2021-07-04 RX ADMIN — Medication 250 MG: at 17:03

## 2021-07-04 RX ADMIN — METHYLPREDNISOLONE SODIUM SUCCINATE 20 MG: 40 INJECTION, POWDER, FOR SOLUTION INTRAMUSCULAR; INTRAVENOUS at 05:11

## 2021-07-04 RX ADMIN — FERROUS SULFATE TAB 325 MG (65 MG ELEMENTAL FE) 325 MG: 325 (65 FE) TAB at 08:25

## 2021-07-05 LAB
ANION GAP SERPL CALCULATED.3IONS-SCNC: 5 MMOL/L (ref 4–13)
BASOPHILS # BLD AUTO: 0.01 THOUSANDS/ΜL (ref 0–0.1)
BASOPHILS NFR BLD AUTO: 0 % (ref 0–1)
BUN SERPL-MCNC: 12 MG/DL (ref 5–25)
CALCIUM SERPL-MCNC: 8.4 MG/DL (ref 8.3–10.1)
CHLORIDE SERPL-SCNC: 103 MMOL/L (ref 100–108)
CO2 SERPL-SCNC: 29 MMOL/L (ref 21–32)
CREAT SERPL-MCNC: 0.75 MG/DL (ref 0.6–1.3)
EOSINOPHIL # BLD AUTO: 0.03 THOUSAND/ΜL (ref 0–0.61)
EOSINOPHIL NFR BLD AUTO: 1 % (ref 0–6)
ERYTHROCYTE [DISTWIDTH] IN BLOOD BY AUTOMATED COUNT: 19.5 % (ref 11.6–15.1)
GFR SERPL CREATININE-BSD FRML MDRD: 131 ML/MIN/1.73SQ M
GLUCOSE SERPL-MCNC: 114 MG/DL (ref 65–140)
HCT VFR BLD AUTO: 27.6 % (ref 36.5–49.3)
HGB BLD-MCNC: 8.5 G/DL (ref 12–17)
IMM GRANULOCYTES # BLD AUTO: 0.03 THOUSAND/UL (ref 0–0.2)
IMM GRANULOCYTES NFR BLD AUTO: 1 % (ref 0–2)
LYMPHOCYTES # BLD AUTO: 1.46 THOUSANDS/ΜL (ref 0.6–4.47)
LYMPHOCYTES NFR BLD AUTO: 24 % (ref 14–44)
MAGNESIUM SERPL-MCNC: 2.1 MG/DL (ref 1.6–2.6)
MCH RBC QN AUTO: 24.9 PG (ref 26.8–34.3)
MCHC RBC AUTO-ENTMCNC: 30.8 G/DL (ref 31.4–37.4)
MCV RBC AUTO: 81 FL (ref 82–98)
MONOCYTES # BLD AUTO: 0.97 THOUSAND/ΜL (ref 0.17–1.22)
MONOCYTES NFR BLD AUTO: 16 % (ref 4–12)
NEUTROPHILS # BLD AUTO: 3.72 THOUSANDS/ΜL (ref 1.85–7.62)
NEUTS SEG NFR BLD AUTO: 58 % (ref 43–75)
NRBC BLD AUTO-RTO: 0 /100 WBCS
PHOSPHATE SERPL-MCNC: 4 MG/DL (ref 2.7–4.5)
PLATELET # BLD AUTO: 243 THOUSANDS/UL (ref 149–390)
PMV BLD AUTO: 11.7 FL (ref 8.9–12.7)
POTASSIUM SERPL-SCNC: 4.6 MMOL/L (ref 3.5–5.3)
RBC # BLD AUTO: 3.41 MILLION/UL (ref 3.88–5.62)
SODIUM SERPL-SCNC: 137 MMOL/L (ref 136–145)
WBC # BLD AUTO: 6.22 THOUSAND/UL (ref 4.31–10.16)

## 2021-07-05 PROCEDURE — 80048 BASIC METABOLIC PNL TOTAL CA: CPT | Performed by: PHYSICIAN ASSISTANT

## 2021-07-05 PROCEDURE — 84100 ASSAY OF PHOSPHORUS: CPT | Performed by: PHYSICIAN ASSISTANT

## 2021-07-05 PROCEDURE — 83735 ASSAY OF MAGNESIUM: CPT | Performed by: PHYSICIAN ASSISTANT

## 2021-07-05 PROCEDURE — 99232 SBSQ HOSP IP/OBS MODERATE 35: CPT | Performed by: NURSE PRACTITIONER

## 2021-07-05 PROCEDURE — 99232 SBSQ HOSP IP/OBS MODERATE 35: CPT | Performed by: INTERNAL MEDICINE

## 2021-07-05 PROCEDURE — 85025 COMPLETE CBC W/AUTO DIFF WBC: CPT | Performed by: PHYSICIAN ASSISTANT

## 2021-07-05 RX ORDER — METHYLPREDNISOLONE SODIUM SUCCINATE 40 MG/ML
20 INJECTION, POWDER, LYOPHILIZED, FOR SOLUTION INTRAMUSCULAR; INTRAVENOUS EVERY 12 HOURS SCHEDULED
Status: DISCONTINUED | OUTPATIENT
Start: 2021-07-05 | End: 2021-07-05

## 2021-07-05 RX ORDER — SODIUM CHLORIDE, SODIUM LACTATE, POTASSIUM CHLORIDE, CALCIUM CHLORIDE 600; 310; 30; 20 MG/100ML; MG/100ML; MG/100ML; MG/100ML
50 INJECTION, SOLUTION INTRAVENOUS CONTINUOUS
Status: DISCONTINUED | OUTPATIENT
Start: 2021-07-05 | End: 2021-07-07

## 2021-07-05 RX ORDER — METHYLPREDNISOLONE SODIUM SUCCINATE 40 MG/ML
20 INJECTION, POWDER, LYOPHILIZED, FOR SOLUTION INTRAMUSCULAR; INTRAVENOUS EVERY 8 HOURS SCHEDULED
Status: DISCONTINUED | OUTPATIENT
Start: 2021-07-05 | End: 2021-07-06

## 2021-07-05 RX ADMIN — SODIUM CHLORIDE, SODIUM LACTATE, POTASSIUM CHLORIDE, AND CALCIUM CHLORIDE 75 ML/HR: .6; .31; .03; .02 INJECTION, SOLUTION INTRAVENOUS at 14:30

## 2021-07-05 RX ADMIN — METRONIDAZOLE 500 MG: 500 INJECTION, SOLUTION INTRAVENOUS at 00:22

## 2021-07-05 RX ADMIN — PANTOPRAZOLE SODIUM 40 MG: 40 TABLET, DELAYED RELEASE ORAL at 05:07

## 2021-07-05 RX ADMIN — MESALAMINE 4 G: 4 ENEMA RECTAL at 22:36

## 2021-07-05 RX ADMIN — ACETAMINOPHEN 650 MG: 325 TABLET, FILM COATED ORAL at 21:37

## 2021-07-05 RX ADMIN — METHYLPREDNISOLONE SODIUM SUCCINATE 20 MG: 40 INJECTION, POWDER, FOR SOLUTION INTRAMUSCULAR; INTRAVENOUS at 09:55

## 2021-07-05 RX ADMIN — Medication 250 MG: at 08:12

## 2021-07-05 RX ADMIN — FERROUS SULFATE TAB 325 MG (65 MG ELEMENTAL FE) 325 MG: 325 (65 FE) TAB at 08:12

## 2021-07-05 RX ADMIN — MESALAMINE 800 MG: 400 CAPSULE, DELAYED RELEASE ORAL at 08:12

## 2021-07-05 RX ADMIN — CEFTRIAXONE SODIUM 1000 MG: 10 INJECTION, POWDER, FOR SOLUTION INTRAVENOUS at 16:59

## 2021-07-05 RX ADMIN — METHYLPREDNISOLONE SODIUM SUCCINATE 20 MG: 40 INJECTION, POWDER, FOR SOLUTION INTRAMUSCULAR; INTRAVENOUS at 16:51

## 2021-07-05 RX ADMIN — MESALAMINE 800 MG: 400 CAPSULE, DELAYED RELEASE ORAL at 21:34

## 2021-07-05 RX ADMIN — MESALAMINE 800 MG: 400 CAPSULE, DELAYED RELEASE ORAL at 16:51

## 2021-07-05 RX ADMIN — METHYLPREDNISOLONE SODIUM SUCCINATE 20 MG: 40 INJECTION, POWDER, FOR SOLUTION INTRAMUSCULAR; INTRAVENOUS at 05:07

## 2021-07-05 RX ADMIN — Medication 250 MG: at 17:00

## 2021-07-05 RX ADMIN — SODIUM CHLORIDE, SODIUM LACTATE, POTASSIUM CHLORIDE, AND CALCIUM CHLORIDE 100 ML/HR: .6; .31; .03; .02 INJECTION, SOLUTION INTRAVENOUS at 06:04

## 2021-07-05 RX ADMIN — METHYLPREDNISOLONE SODIUM SUCCINATE 20 MG: 40 INJECTION, POWDER, FOR SOLUTION INTRAMUSCULAR; INTRAVENOUS at 22:24

## 2021-07-05 RX ADMIN — METRONIDAZOLE 500 MG: 500 INJECTION, SOLUTION INTRAVENOUS at 16:51

## 2021-07-05 RX ADMIN — METRONIDAZOLE 500 MG: 500 INJECTION, SOLUTION INTRAVENOUS at 08:12

## 2021-07-06 LAB
ANION GAP SERPL CALCULATED.3IONS-SCNC: 5 MMOL/L (ref 4–13)
BASOPHILS # BLD AUTO: 0.02 THOUSANDS/ΜL (ref 0–0.1)
BASOPHILS NFR BLD AUTO: 0 % (ref 0–1)
BUN SERPL-MCNC: 10 MG/DL (ref 5–25)
CALCIUM SERPL-MCNC: 8.4 MG/DL (ref 8.3–10.1)
CHLORIDE SERPL-SCNC: 103 MMOL/L (ref 100–108)
CO2 SERPL-SCNC: 28 MMOL/L (ref 21–32)
CREAT SERPL-MCNC: 0.72 MG/DL (ref 0.6–1.3)
EOSINOPHIL # BLD AUTO: 0.07 THOUSAND/ΜL (ref 0–0.61)
EOSINOPHIL NFR BLD AUTO: 1 % (ref 0–6)
ERYTHROCYTE [DISTWIDTH] IN BLOOD BY AUTOMATED COUNT: 20.1 % (ref 11.6–15.1)
GFR SERPL CREATININE-BSD FRML MDRD: 133 ML/MIN/1.73SQ M
GLUCOSE SERPL-MCNC: 100 MG/DL (ref 65–140)
HCT VFR BLD AUTO: 28.5 % (ref 36.5–49.3)
HGB BLD-MCNC: 8.7 G/DL (ref 12–17)
IMM GRANULOCYTES # BLD AUTO: 0.07 THOUSAND/UL (ref 0–0.2)
IMM GRANULOCYTES NFR BLD AUTO: 1 % (ref 0–2)
LYMPHOCYTES # BLD AUTO: 2.1 THOUSANDS/ΜL (ref 0.6–4.47)
LYMPHOCYTES NFR BLD AUTO: 24 % (ref 14–44)
MCH RBC QN AUTO: 24.9 PG (ref 26.8–34.3)
MCHC RBC AUTO-ENTMCNC: 30.5 G/DL (ref 31.4–37.4)
MCV RBC AUTO: 81 FL (ref 82–98)
MONOCYTES # BLD AUTO: 1.72 THOUSAND/ΜL (ref 0.17–1.22)
MONOCYTES NFR BLD AUTO: 20 % (ref 4–12)
NEUTROPHILS # BLD AUTO: 4.86 THOUSANDS/ΜL (ref 1.85–7.62)
NEUTS SEG NFR BLD AUTO: 54 % (ref 43–75)
NRBC BLD AUTO-RTO: 0 /100 WBCS
PLATELET # BLD AUTO: 237 THOUSANDS/UL (ref 149–390)
PMV BLD AUTO: 11.4 FL (ref 8.9–12.7)
POTASSIUM SERPL-SCNC: 3.9 MMOL/L (ref 3.5–5.3)
RBC # BLD AUTO: 3.5 MILLION/UL (ref 3.88–5.62)
SODIUM SERPL-SCNC: 136 MMOL/L (ref 136–145)
WBC # BLD AUTO: 8.84 THOUSAND/UL (ref 4.31–10.16)

## 2021-07-06 PROCEDURE — 80048 BASIC METABOLIC PNL TOTAL CA: CPT | Performed by: INTERNAL MEDICINE

## 2021-07-06 PROCEDURE — 99232 SBSQ HOSP IP/OBS MODERATE 35: CPT | Performed by: INTERNAL MEDICINE

## 2021-07-06 PROCEDURE — 99232 SBSQ HOSP IP/OBS MODERATE 35: CPT | Performed by: NURSE PRACTITIONER

## 2021-07-06 PROCEDURE — 85025 COMPLETE CBC W/AUTO DIFF WBC: CPT | Performed by: INTERNAL MEDICINE

## 2021-07-06 RX ORDER — METHYLPREDNISOLONE SODIUM SUCCINATE 40 MG/ML
20 INJECTION, POWDER, LYOPHILIZED, FOR SOLUTION INTRAMUSCULAR; INTRAVENOUS EVERY 12 HOURS SCHEDULED
Status: DISCONTINUED | OUTPATIENT
Start: 2021-07-06 | End: 2021-07-09

## 2021-07-06 RX ADMIN — METHYLPREDNISOLONE SODIUM SUCCINATE 20 MG: 40 INJECTION, POWDER, FOR SOLUTION INTRAMUSCULAR; INTRAVENOUS at 05:52

## 2021-07-06 RX ADMIN — SODIUM CHLORIDE, SODIUM LACTATE, POTASSIUM CHLORIDE, AND CALCIUM CHLORIDE 75 ML/HR: .6; .31; .03; .02 INJECTION, SOLUTION INTRAVENOUS at 03:05

## 2021-07-06 RX ADMIN — PANTOPRAZOLE SODIUM 40 MG: 40 TABLET, DELAYED RELEASE ORAL at 05:52

## 2021-07-06 RX ADMIN — MESALAMINE 800 MG: 400 CAPSULE, DELAYED RELEASE ORAL at 22:54

## 2021-07-06 RX ADMIN — METRONIDAZOLE 500 MG: 500 INJECTION, SOLUTION INTRAVENOUS at 00:53

## 2021-07-06 RX ADMIN — Medication 250 MG: at 17:54

## 2021-07-06 RX ADMIN — MESALAMINE 4 G: 4 ENEMA RECTAL at 22:55

## 2021-07-06 RX ADMIN — Medication 250 MG: at 08:21

## 2021-07-06 RX ADMIN — METRONIDAZOLE 500 MG: 500 INJECTION, SOLUTION INTRAVENOUS at 08:21

## 2021-07-06 RX ADMIN — FERROUS SULFATE TAB 325 MG (65 MG ELEMENTAL FE) 325 MG: 325 (65 FE) TAB at 08:21

## 2021-07-06 RX ADMIN — MESALAMINE 800 MG: 400 CAPSULE, DELAYED RELEASE ORAL at 16:58

## 2021-07-06 RX ADMIN — MESALAMINE 800 MG: 400 CAPSULE, DELAYED RELEASE ORAL at 08:21

## 2021-07-06 RX ADMIN — CEFTRIAXONE SODIUM 1000 MG: 10 INJECTION, POWDER, FOR SOLUTION INTRAVENOUS at 16:43

## 2021-07-06 RX ADMIN — METHYLPREDNISOLONE SODIUM SUCCINATE 20 MG: 40 INJECTION, POWDER, FOR SOLUTION INTRAMUSCULAR; INTRAVENOUS at 22:54

## 2021-07-06 RX ADMIN — METRONIDAZOLE 500 MG: 500 INJECTION, SOLUTION INTRAVENOUS at 17:55

## 2021-07-07 LAB
ANION GAP SERPL CALCULATED.3IONS-SCNC: 8 MMOL/L (ref 4–13)
BACTERIA BLD CULT: NORMAL
BACTERIA BLD CULT: NORMAL
BUN SERPL-MCNC: 13 MG/DL (ref 5–25)
CALCIUM SERPL-MCNC: 8.6 MG/DL (ref 8.3–10.1)
CHLORIDE SERPL-SCNC: 102 MMOL/L (ref 100–108)
CO2 SERPL-SCNC: 29 MMOL/L (ref 21–32)
CREAT SERPL-MCNC: 0.87 MG/DL (ref 0.6–1.3)
CRP SERPL QL: 60 MG/L
ERYTHROCYTE [DISTWIDTH] IN BLOOD BY AUTOMATED COUNT: 20.2 % (ref 11.6–15.1)
GFR SERPL CREATININE-BSD FRML MDRD: 123 ML/MIN/1.73SQ M
GLUCOSE SERPL-MCNC: 108 MG/DL (ref 65–140)
HCT VFR BLD AUTO: 31.1 % (ref 36.5–49.3)
HGB BLD-MCNC: 9.4 G/DL (ref 12–17)
MCH RBC QN AUTO: 25.1 PG (ref 26.8–34.3)
MCHC RBC AUTO-ENTMCNC: 30.2 G/DL (ref 31.4–37.4)
MCV RBC AUTO: 83 FL (ref 82–98)
MISCELLANEOUS LAB TEST RESULT: NORMAL
PLATELET # BLD AUTO: 248 THOUSANDS/UL (ref 149–390)
PMV BLD AUTO: 11.1 FL (ref 8.9–12.7)
POTASSIUM SERPL-SCNC: 4.1 MMOL/L (ref 3.5–5.3)
RBC # BLD AUTO: 3.74 MILLION/UL (ref 3.88–5.62)
SODIUM SERPL-SCNC: 139 MMOL/L (ref 136–145)
WBC # BLD AUTO: 8.66 THOUSAND/UL (ref 4.31–10.16)

## 2021-07-07 PROCEDURE — 80048 BASIC METABOLIC PNL TOTAL CA: CPT | Performed by: NURSE PRACTITIONER

## 2021-07-07 PROCEDURE — 85027 COMPLETE CBC AUTOMATED: CPT | Performed by: NURSE PRACTITIONER

## 2021-07-07 PROCEDURE — 86140 C-REACTIVE PROTEIN: CPT | Performed by: INTERNAL MEDICINE

## 2021-07-07 PROCEDURE — 99232 SBSQ HOSP IP/OBS MODERATE 35: CPT | Performed by: INTERNAL MEDICINE

## 2021-07-07 PROCEDURE — 99232 SBSQ HOSP IP/OBS MODERATE 35: CPT | Performed by: NURSE PRACTITIONER

## 2021-07-07 RX ADMIN — MESALAMINE 800 MG: 400 CAPSULE, DELAYED RELEASE ORAL at 17:04

## 2021-07-07 RX ADMIN — METRONIDAZOLE 500 MG: 500 INJECTION, SOLUTION INTRAVENOUS at 08:01

## 2021-07-07 RX ADMIN — MESALAMINE 800 MG: 400 CAPSULE, DELAYED RELEASE ORAL at 08:02

## 2021-07-07 RX ADMIN — PANTOPRAZOLE SODIUM 40 MG: 40 TABLET, DELAYED RELEASE ORAL at 05:36

## 2021-07-07 RX ADMIN — METRONIDAZOLE 500 MG: 500 INJECTION, SOLUTION INTRAVENOUS at 00:58

## 2021-07-07 RX ADMIN — MESALAMINE 4 G: 4 ENEMA RECTAL at 23:32

## 2021-07-07 RX ADMIN — METHYLPREDNISOLONE SODIUM SUCCINATE 20 MG: 40 INJECTION, POWDER, FOR SOLUTION INTRAMUSCULAR; INTRAVENOUS at 21:21

## 2021-07-07 RX ADMIN — Medication 250 MG: at 17:04

## 2021-07-07 RX ADMIN — MESALAMINE 800 MG: 400 CAPSULE, DELAYED RELEASE ORAL at 21:22

## 2021-07-07 RX ADMIN — Medication 250 MG: at 08:02

## 2021-07-07 RX ADMIN — FERROUS SULFATE TAB 325 MG (65 MG ELEMENTAL FE) 325 MG: 325 (65 FE) TAB at 08:02

## 2021-07-07 RX ADMIN — METHYLPREDNISOLONE SODIUM SUCCINATE 20 MG: 40 INJECTION, POWDER, FOR SOLUTION INTRAMUSCULAR; INTRAVENOUS at 08:02

## 2021-07-08 ENCOUNTER — TELEPHONE (OUTPATIENT)
Dept: GASTROENTEROLOGY | Facility: CLINIC | Age: 21
End: 2021-07-08

## 2021-07-08 ENCOUNTER — APPOINTMENT (INPATIENT)
Dept: RADIOLOGY | Facility: HOSPITAL | Age: 21
DRG: 245 | End: 2021-07-08
Payer: COMMERCIAL

## 2021-07-08 LAB
ALBUMIN SERPL BCP-MCNC: 2.5 G/DL (ref 3.5–5)
ALP SERPL-CCNC: 53 U/L (ref 46–116)
ALT SERPL W P-5'-P-CCNC: 36 U/L (ref 12–78)
ANION GAP SERPL CALCULATED.3IONS-SCNC: 8 MMOL/L (ref 4–13)
AST SERPL W P-5'-P-CCNC: 19 U/L (ref 5–45)
BILIRUB SERPL-MCNC: 0.45 MG/DL (ref 0.2–1)
BILIRUB UR QL STRIP: NEGATIVE
BUN SERPL-MCNC: 14 MG/DL (ref 5–25)
CALCIUM ALBUM COR SERPL-MCNC: 10.1 MG/DL (ref 8.3–10.1)
CALCIUM SERPL-MCNC: 8.9 MG/DL (ref 8.3–10.1)
CHLORIDE SERPL-SCNC: 100 MMOL/L (ref 100–108)
CLARITY UR: CLEAR
CO2 SERPL-SCNC: 29 MMOL/L (ref 21–32)
COLOR UR: YELLOW
CREAT SERPL-MCNC: 1.01 MG/DL (ref 0.6–1.3)
CRP SERPL QL: 116 MG/L
ERYTHROCYTE [DISTWIDTH] IN BLOOD BY AUTOMATED COUNT: 20 % (ref 11.6–15.1)
GFR SERPL CREATININE-BSD FRML MDRD: 106 ML/MIN/1.73SQ M
GLUCOSE SERPL-MCNC: 109 MG/DL (ref 65–140)
GLUCOSE SERPL-MCNC: 135 MG/DL (ref 65–140)
GLUCOSE UR STRIP-MCNC: NEGATIVE MG/DL
HCT VFR BLD AUTO: 30.2 % (ref 36.5–49.3)
HCT VFR BLD AUTO: 31.5 % (ref 36.5–49.3)
HGB BLD-MCNC: 9.3 G/DL (ref 12–17)
HGB BLD-MCNC: 9.9 G/DL (ref 12–17)
HGB UR QL STRIP.AUTO: NEGATIVE
KETONES UR STRIP-MCNC: NEGATIVE MG/DL
LEUKOCYTE ESTERASE UR QL STRIP: NEGATIVE
MCH RBC QN AUTO: 25.1 PG (ref 26.8–34.3)
MCHC RBC AUTO-ENTMCNC: 30.8 G/DL (ref 31.4–37.4)
MCV RBC AUTO: 82 FL (ref 82–98)
NITRITE UR QL STRIP: NEGATIVE
O+P STL CONC: NORMAL
PH UR STRIP.AUTO: 6 [PH]
PLATELET # BLD AUTO: 260 THOUSANDS/UL (ref 149–390)
PMV BLD AUTO: 11.7 FL (ref 8.9–12.7)
POTASSIUM SERPL-SCNC: 3.8 MMOL/L (ref 3.5–5.3)
PROCALCITONIN SERPL-MCNC: 0.12 NG/ML
PROT SERPL-MCNC: 6.8 G/DL (ref 6.4–8.2)
PROT UR STRIP-MCNC: NEGATIVE MG/DL
RBC # BLD AUTO: 3.7 MILLION/UL (ref 3.88–5.62)
SODIUM SERPL-SCNC: 137 MMOL/L (ref 136–145)
SP GR UR STRIP.AUTO: 1.02 (ref 1–1.03)
UROBILINOGEN UR QL STRIP.AUTO: 0.2 E.U./DL
WBC # BLD AUTO: 9.02 THOUSAND/UL (ref 4.31–10.16)

## 2021-07-08 PROCEDURE — 71045 X-RAY EXAM CHEST 1 VIEW: CPT

## 2021-07-08 PROCEDURE — NC001 PR NO CHARGE: Performed by: PHYSICIAN ASSISTANT

## 2021-07-08 PROCEDURE — 87040 BLOOD CULTURE FOR BACTERIA: CPT | Performed by: PHYSICIAN ASSISTANT

## 2021-07-08 PROCEDURE — 99233 SBSQ HOSP IP/OBS HIGH 50: CPT | Performed by: NURSE PRACTITIONER

## 2021-07-08 PROCEDURE — 85018 HEMOGLOBIN: CPT | Performed by: FAMILY MEDICINE

## 2021-07-08 PROCEDURE — 86140 C-REACTIVE PROTEIN: CPT | Performed by: PHYSICIAN ASSISTANT

## 2021-07-08 PROCEDURE — 93005 ELECTROCARDIOGRAM TRACING: CPT

## 2021-07-08 PROCEDURE — 87493 C DIFF AMPLIFIED PROBE: CPT | Performed by: PHYSICIAN ASSISTANT

## 2021-07-08 PROCEDURE — 85027 COMPLETE CBC AUTOMATED: CPT | Performed by: NURSE PRACTITIONER

## 2021-07-08 PROCEDURE — 81003 URINALYSIS AUTO W/O SCOPE: CPT | Performed by: PHYSICIAN ASSISTANT

## 2021-07-08 PROCEDURE — 85014 HEMATOCRIT: CPT | Performed by: FAMILY MEDICINE

## 2021-07-08 PROCEDURE — 80053 COMPREHEN METABOLIC PANEL: CPT | Performed by: PHYSICIAN ASSISTANT

## 2021-07-08 PROCEDURE — 82948 REAGENT STRIP/BLOOD GLUCOSE: CPT

## 2021-07-08 PROCEDURE — 99232 SBSQ HOSP IP/OBS MODERATE 35: CPT | Performed by: INTERNAL MEDICINE

## 2021-07-08 PROCEDURE — 84145 PROCALCITONIN (PCT): CPT | Performed by: NURSE PRACTITIONER

## 2021-07-08 RX ORDER — SODIUM CHLORIDE, SODIUM GLUCONATE, SODIUM ACETATE, POTASSIUM CHLORIDE, MAGNESIUM CHLORIDE, SODIUM PHOSPHATE, DIBASIC, AND POTASSIUM PHOSPHATE .53; .5; .37; .037; .03; .012; .00082 G/100ML; G/100ML; G/100ML; G/100ML; G/100ML; G/100ML; G/100ML
500 INJECTION, SOLUTION INTRAVENOUS ONCE
Status: COMPLETED | OUTPATIENT
Start: 2021-07-08 | End: 2021-07-08

## 2021-07-08 RX ADMIN — Medication 250 MG: at 08:20

## 2021-07-08 RX ADMIN — MESALAMINE 800 MG: 400 CAPSULE, DELAYED RELEASE ORAL at 17:05

## 2021-07-08 RX ADMIN — FERROUS SULFATE TAB 325 MG (65 MG ELEMENTAL FE) 325 MG: 325 (65 FE) TAB at 08:20

## 2021-07-08 RX ADMIN — MESALAMINE 800 MG: 400 CAPSULE, DELAYED RELEASE ORAL at 08:20

## 2021-07-08 RX ADMIN — MESALAMINE 800 MG: 400 CAPSULE, DELAYED RELEASE ORAL at 22:40

## 2021-07-08 RX ADMIN — PANTOPRAZOLE SODIUM 40 MG: 40 TABLET, DELAYED RELEASE ORAL at 05:21

## 2021-07-08 RX ADMIN — SODIUM CHLORIDE, SODIUM GLUCONATE, SODIUM ACETATE, POTASSIUM CHLORIDE, MAGNESIUM CHLORIDE, SODIUM PHOSPHATE, DIBASIC, AND POTASSIUM PHOSPHATE 500 ML: .53; .5; .37; .037; .03; .012; .00082 INJECTION, SOLUTION INTRAVENOUS at 21:06

## 2021-07-08 RX ADMIN — MESALAMINE 4 G: 4 ENEMA RECTAL at 22:40

## 2021-07-08 RX ADMIN — ACETAMINOPHEN 650 MG: 325 TABLET, FILM COATED ORAL at 08:18

## 2021-07-08 RX ADMIN — Medication 250 MG: at 17:07

## 2021-07-08 RX ADMIN — METHYLPREDNISOLONE SODIUM SUCCINATE 20 MG: 40 INJECTION, POWDER, FOR SOLUTION INTRAMUSCULAR; INTRAVENOUS at 08:21

## 2021-07-08 RX ADMIN — METHYLPREDNISOLONE SODIUM SUCCINATE 20 MG: 40 INJECTION, POWDER, FOR SOLUTION INTRAMUSCULAR; INTRAVENOUS at 22:39

## 2021-07-08 RX ADMIN — SODIUM CHLORIDE 250 ML: 0.9 INJECTION, SOLUTION INTRAVENOUS at 22:40

## 2021-07-09 ENCOUNTER — TELEPHONE (OUTPATIENT)
Dept: GASTROENTEROLOGY | Facility: CLINIC | Age: 21
End: 2021-07-09

## 2021-07-09 LAB
ATRIAL RATE: 114 BPM
C DIFF TOX B TCDB STL QL NAA+PROBE: NEGATIVE
ERYTHROCYTE [DISTWIDTH] IN BLOOD BY AUTOMATED COUNT: 19.9 % (ref 11.6–15.1)
HCT VFR BLD AUTO: 29.4 % (ref 36.5–49.3)
HGB BLD-MCNC: 8.9 G/DL (ref 12–17)
MCH RBC QN AUTO: 24.7 PG (ref 26.8–34.3)
MCHC RBC AUTO-ENTMCNC: 30.3 G/DL (ref 31.4–37.4)
MCV RBC AUTO: 81 FL (ref 82–98)
P AXIS: 53 DEGREES
PLATELET # BLD AUTO: 262 THOUSANDS/UL (ref 149–390)
PMV BLD AUTO: 11.6 FL (ref 8.9–12.7)
PR INTERVAL: 128 MS
PROCALCITONIN SERPL-MCNC: 0.29 NG/ML
QRS AXIS: 58 DEGREES
QRSD INTERVAL: 82 MS
QT INTERVAL: 308 MS
QTC INTERVAL: 424 MS
RBC # BLD AUTO: 3.61 MILLION/UL (ref 3.88–5.62)
T WAVE AXIS: 48 DEGREES
VENTRICULAR RATE: 114 BPM
WBC # BLD AUTO: 6.73 THOUSAND/UL (ref 4.31–10.16)

## 2021-07-09 PROCEDURE — 80053 COMPREHEN METABOLIC PANEL: CPT | Performed by: PHYSICIAN ASSISTANT

## 2021-07-09 PROCEDURE — 93010 ELECTROCARDIOGRAM REPORT: CPT | Performed by: INTERNAL MEDICINE

## 2021-07-09 PROCEDURE — 99232 SBSQ HOSP IP/OBS MODERATE 35: CPT | Performed by: INTERNAL MEDICINE

## 2021-07-09 PROCEDURE — 85027 COMPLETE CBC AUTOMATED: CPT | Performed by: PHYSICIAN ASSISTANT

## 2021-07-09 PROCEDURE — 85027 COMPLETE CBC AUTOMATED: CPT | Performed by: NURSE PRACTITIONER

## 2021-07-09 PROCEDURE — 84145 PROCALCITONIN (PCT): CPT | Performed by: NURSE PRACTITIONER

## 2021-07-09 PROCEDURE — 85007 BL SMEAR W/DIFF WBC COUNT: CPT | Performed by: PHYSICIAN ASSISTANT

## 2021-07-09 PROCEDURE — 86140 C-REACTIVE PROTEIN: CPT | Performed by: PHYSICIAN ASSISTANT

## 2021-07-09 PROCEDURE — 99232 SBSQ HOSP IP/OBS MODERATE 35: CPT | Performed by: NURSE PRACTITIONER

## 2021-07-09 RX ORDER — METHYLPREDNISOLONE SODIUM SUCCINATE 40 MG/ML
20 INJECTION, POWDER, LYOPHILIZED, FOR SOLUTION INTRAMUSCULAR; INTRAVENOUS EVERY 8 HOURS SCHEDULED
Status: DISCONTINUED | OUTPATIENT
Start: 2021-07-09 | End: 2021-07-15 | Stop reason: HOSPADM

## 2021-07-09 RX ORDER — SODIUM CHLORIDE 9 MG/ML
125 INJECTION, SOLUTION INTRAVENOUS CONTINUOUS
Status: DISCONTINUED | OUTPATIENT
Start: 2021-07-09 | End: 2021-07-15 | Stop reason: HOSPADM

## 2021-07-09 RX ADMIN — MESALAMINE 4 G: 4 ENEMA RECTAL at 22:46

## 2021-07-09 RX ADMIN — MESALAMINE 800 MG: 400 CAPSULE, DELAYED RELEASE ORAL at 17:46

## 2021-07-09 RX ADMIN — SODIUM CHLORIDE 125 ML/HR: 0.9 INJECTION, SOLUTION INTRAVENOUS at 20:57

## 2021-07-09 RX ADMIN — SODIUM CHLORIDE 75 ML/HR: 0.9 INJECTION, SOLUTION INTRAVENOUS at 09:59

## 2021-07-09 RX ADMIN — Medication 250 MG: at 09:01

## 2021-07-09 RX ADMIN — SODIUM CHLORIDE 500 ML: 0.9 INJECTION, SOLUTION INTRAVENOUS at 15:24

## 2021-07-09 RX ADMIN — METHYLPREDNISOLONE SODIUM SUCCINATE 20 MG: 40 INJECTION, POWDER, FOR SOLUTION INTRAMUSCULAR; INTRAVENOUS at 09:01

## 2021-07-09 RX ADMIN — METHYLPREDNISOLONE SODIUM SUCCINATE 20 MG: 40 INJECTION, POWDER, FOR SOLUTION INTRAMUSCULAR; INTRAVENOUS at 22:44

## 2021-07-09 RX ADMIN — MESALAMINE 800 MG: 400 CAPSULE, DELAYED RELEASE ORAL at 09:01

## 2021-07-09 RX ADMIN — METHYLPREDNISOLONE SODIUM SUCCINATE 20 MG: 40 INJECTION, POWDER, FOR SOLUTION INTRAMUSCULAR; INTRAVENOUS at 14:16

## 2021-07-09 RX ADMIN — Medication 250 MG: at 17:47

## 2021-07-09 RX ADMIN — MESALAMINE 800 MG: 400 CAPSULE, DELAYED RELEASE ORAL at 21:31

## 2021-07-09 RX ADMIN — PANTOPRAZOLE SODIUM 40 MG: 40 TABLET, DELAYED RELEASE ORAL at 06:40

## 2021-07-09 RX ADMIN — FERROUS SULFATE TAB 325 MG (65 MG ELEMENTAL FE) 325 MG: 325 (65 FE) TAB at 09:01

## 2021-07-09 NOTE — TELEPHONE ENCOUNTER
----- Message from Amy Agosto MD sent at 7/5/2021  9:21 AM EDT -----  This message cancels out any prior messages about this patient    He is in the hospital and got Remicade over the weekend    Please submit for Remicade induction ASAP so that he can get a dose in 2 weeks as an outpatient

## 2021-07-10 LAB
ALBUMIN SERPL BCP-MCNC: 2 G/DL (ref 3.5–5)
ALP SERPL-CCNC: 46 U/L (ref 46–116)
ALT SERPL W P-5'-P-CCNC: 38 U/L (ref 12–78)
ANION GAP SERPL CALCULATED.3IONS-SCNC: 4 MMOL/L (ref 4–13)
ANION GAP SERPL CALCULATED.3IONS-SCNC: 6 MMOL/L (ref 4–13)
ANISOCYTOSIS BLD QL SMEAR: PRESENT
AST SERPL W P-5'-P-CCNC: 30 U/L (ref 5–45)
BASOPHILS # BLD MANUAL: 0 THOUSAND/UL (ref 0–0.1)
BASOPHILS NFR MAR MANUAL: 0 % (ref 0–1)
BILIRUB SERPL-MCNC: 0.33 MG/DL (ref 0.2–1)
BUN SERPL-MCNC: 13 MG/DL (ref 5–25)
BUN SERPL-MCNC: 16 MG/DL (ref 5–25)
CALCIUM ALBUM COR SERPL-MCNC: 10 MG/DL (ref 8.3–10.1)
CALCIUM SERPL-MCNC: 8.3 MG/DL (ref 8.3–10.1)
CALCIUM SERPL-MCNC: 8.4 MG/DL (ref 8.3–10.1)
CHLORIDE SERPL-SCNC: 103 MMOL/L (ref 100–108)
CHLORIDE SERPL-SCNC: 103 MMOL/L (ref 100–108)
CO2 SERPL-SCNC: 26 MMOL/L (ref 21–32)
CO2 SERPL-SCNC: 29 MMOL/L (ref 21–32)
CREAT SERPL-MCNC: 0.71 MG/DL (ref 0.6–1.3)
CREAT SERPL-MCNC: 0.92 MG/DL (ref 0.6–1.3)
CRP SERPL QL: 103.6 MG/L
CRP SERPL QL: 106.7 MG/L
EOSINOPHIL # BLD MANUAL: 0 THOUSAND/UL (ref 0–0.4)
EOSINOPHIL NFR BLD MANUAL: 0 % (ref 0–6)
ERYTHROCYTE [DISTWIDTH] IN BLOOD BY AUTOMATED COUNT: 19.5 % (ref 11.6–15.1)
ERYTHROCYTE [DISTWIDTH] IN BLOOD BY AUTOMATED COUNT: 19.6 % (ref 11.6–15.1)
GFR SERPL CREATININE-BSD FRML MDRD: 118 ML/MIN/1.73SQ M
GFR SERPL CREATININE-BSD FRML MDRD: 134 ML/MIN/1.73SQ M
GLUCOSE SERPL-MCNC: 127 MG/DL (ref 65–140)
GLUCOSE SERPL-MCNC: 127 MG/DL (ref 65–140)
HCT VFR BLD AUTO: 24.6 % (ref 36.5–49.3)
HCT VFR BLD AUTO: 25.3 % (ref 36.5–49.3)
HGB BLD-MCNC: 7.6 G/DL (ref 12–17)
HGB BLD-MCNC: 7.7 G/DL (ref 12–17)
HYPERCHROMIA BLD QL SMEAR: PRESENT
LYMPHOCYTES # BLD AUTO: 1.02 THOUSAND/UL (ref 0.6–4.47)
LYMPHOCYTES # BLD AUTO: 14 % (ref 14–44)
MCH RBC QN AUTO: 24.9 PG (ref 26.8–34.3)
MCH RBC QN AUTO: 25.1 PG (ref 26.8–34.3)
MCHC RBC AUTO-ENTMCNC: 30.4 G/DL (ref 31.4–37.4)
MCHC RBC AUTO-ENTMCNC: 30.9 G/DL (ref 31.4–37.4)
MCV RBC AUTO: 81 FL (ref 82–98)
MCV RBC AUTO: 82 FL (ref 82–98)
MONOCYTES # BLD AUTO: 0.44 THOUSAND/UL (ref 0–1.22)
MONOCYTES NFR BLD: 6 % (ref 4–12)
NEUTROPHILS # BLD MANUAL: 5.81 THOUSAND/UL (ref 1.85–7.62)
NEUTS BAND NFR BLD MANUAL: 7 % (ref 0–8)
NEUTS SEG NFR BLD AUTO: 73 % (ref 43–75)
NRBC BLD AUTO-RTO: 0 /100 WBCS
PLATELET # BLD AUTO: 229 THOUSANDS/UL (ref 149–390)
PLATELET # BLD AUTO: 245 THOUSANDS/UL (ref 149–390)
PLATELET BLD QL SMEAR: ADEQUATE
PMV BLD AUTO: 11.3 FL (ref 8.9–12.7)
PMV BLD AUTO: 11.4 FL (ref 8.9–12.7)
POIKILOCYTOSIS BLD QL SMEAR: PRESENT
POTASSIUM SERPL-SCNC: 4.2 MMOL/L (ref 3.5–5.3)
POTASSIUM SERPL-SCNC: 4.6 MMOL/L (ref 3.5–5.3)
PROT SERPL-MCNC: 5.5 G/DL (ref 6.4–8.2)
RBC # BLD AUTO: 3.03 MILLION/UL (ref 3.88–5.62)
RBC # BLD AUTO: 3.09 MILLION/UL (ref 3.88–5.62)
SODIUM SERPL-SCNC: 135 MMOL/L (ref 136–145)
SODIUM SERPL-SCNC: 136 MMOL/L (ref 136–145)
TOTAL CELLS COUNTED SPEC: 100
WBC # BLD AUTO: 5.81 THOUSAND/UL (ref 4.31–10.16)
WBC # BLD AUTO: 7.26 THOUSAND/UL (ref 4.31–10.16)

## 2021-07-10 PROCEDURE — 86140 C-REACTIVE PROTEIN: CPT | Performed by: PHYSICIAN ASSISTANT

## 2021-07-10 PROCEDURE — 97163 PT EVAL HIGH COMPLEX 45 MIN: CPT

## 2021-07-10 PROCEDURE — 85027 COMPLETE CBC AUTOMATED: CPT | Performed by: NURSE PRACTITIONER

## 2021-07-10 PROCEDURE — 99232 SBSQ HOSP IP/OBS MODERATE 35: CPT | Performed by: NURSE PRACTITIONER

## 2021-07-10 PROCEDURE — 80048 BASIC METABOLIC PNL TOTAL CA: CPT | Performed by: NURSE PRACTITIONER

## 2021-07-10 PROCEDURE — 97530 THERAPEUTIC ACTIVITIES: CPT

## 2021-07-10 PROCEDURE — 99232 SBSQ HOSP IP/OBS MODERATE 35: CPT | Performed by: INTERNAL MEDICINE

## 2021-07-10 RX ORDER — DIPHENHYDRAMINE HCL 25 MG
25 TABLET ORAL ONCE
Status: COMPLETED | OUTPATIENT
Start: 2021-07-10 | End: 2021-07-10

## 2021-07-10 RX ORDER — ACETAMINOPHEN 325 MG/1
650 TABLET ORAL ONCE
Status: COMPLETED | OUTPATIENT
Start: 2021-07-10 | End: 2021-07-10

## 2021-07-10 RX ADMIN — METHYLPREDNISOLONE SODIUM SUCCINATE 20 MG: 40 INJECTION, POWDER, FOR SOLUTION INTRAMUSCULAR; INTRAVENOUS at 15:06

## 2021-07-10 RX ADMIN — MESALAMINE 4 G: 4 ENEMA RECTAL at 21:27

## 2021-07-10 RX ADMIN — MESALAMINE 800 MG: 400 CAPSULE, DELAYED RELEASE ORAL at 08:56

## 2021-07-10 RX ADMIN — Medication 250 MG: at 08:56

## 2021-07-10 RX ADMIN — INFLIXIMAB 500 MG: 100 INJECTION, POWDER, LYOPHILIZED, FOR SOLUTION INTRAVENOUS at 12:42

## 2021-07-10 RX ADMIN — SODIUM CHLORIDE 125 ML/HR: 0.9 INJECTION, SOLUTION INTRAVENOUS at 19:38

## 2021-07-10 RX ADMIN — PANTOPRAZOLE SODIUM 40 MG: 40 TABLET, DELAYED RELEASE ORAL at 05:35

## 2021-07-10 RX ADMIN — ACETAMINOPHEN 650 MG: 325 TABLET, FILM COATED ORAL at 11:35

## 2021-07-10 RX ADMIN — METHYLPREDNISOLONE SODIUM SUCCINATE 20 MG: 40 INJECTION, POWDER, FOR SOLUTION INTRAMUSCULAR; INTRAVENOUS at 21:23

## 2021-07-10 RX ADMIN — Medication 250 MG: at 17:36

## 2021-07-10 RX ADMIN — MESALAMINE 800 MG: 400 CAPSULE, DELAYED RELEASE ORAL at 15:06

## 2021-07-10 RX ADMIN — FERROUS SULFATE TAB 325 MG (65 MG ELEMENTAL FE) 325 MG: 325 (65 FE) TAB at 08:56

## 2021-07-10 RX ADMIN — DIPHENHYDRAMINE HYDROCHLORIDE 25 MG: 25 TABLET ORAL at 10:36

## 2021-07-10 RX ADMIN — SODIUM CHLORIDE 125 ML/HR: 0.9 INJECTION, SOLUTION INTRAVENOUS at 04:53

## 2021-07-10 RX ADMIN — MESALAMINE 800 MG: 400 CAPSULE, DELAYED RELEASE ORAL at 21:23

## 2021-07-10 RX ADMIN — METHYLPREDNISOLONE SODIUM SUCCINATE 20 MG: 40 INJECTION, POWDER, FOR SOLUTION INTRAMUSCULAR; INTRAVENOUS at 05:35

## 2021-07-11 LAB
ABO GROUP BLD: NORMAL
ANION GAP SERPL CALCULATED.3IONS-SCNC: 6 MMOL/L (ref 4–13)
BLD GP AB SCN SERPL QL: NEGATIVE
BUN SERPL-MCNC: 11 MG/DL (ref 5–25)
CALCIUM SERPL-MCNC: 7.9 MG/DL (ref 8.3–10.1)
CHLORIDE SERPL-SCNC: 104 MMOL/L (ref 100–108)
CO2 SERPL-SCNC: 27 MMOL/L (ref 21–32)
CREAT SERPL-MCNC: 0.56 MG/DL (ref 0.6–1.3)
ERYTHROCYTE [DISTWIDTH] IN BLOOD BY AUTOMATED COUNT: 19.6 % (ref 11.6–15.1)
GFR SERPL CREATININE-BSD FRML MDRD: 148 ML/MIN/1.73SQ M
GLUCOSE SERPL-MCNC: 115 MG/DL (ref 65–140)
HCT VFR BLD AUTO: 22.2 % (ref 36.5–49.3)
HCT VFR BLD AUTO: 27.6 % (ref 36.5–49.3)
HGB BLD-MCNC: 6.8 G/DL (ref 12–17)
HGB BLD-MCNC: 8.5 G/DL (ref 12–17)
MCH RBC QN AUTO: 24.9 PG (ref 26.8–34.3)
MCHC RBC AUTO-ENTMCNC: 30.6 G/DL (ref 31.4–37.4)
MCV RBC AUTO: 81 FL (ref 82–98)
PLATELET # BLD AUTO: 202 THOUSANDS/UL (ref 149–390)
PMV BLD AUTO: 11.4 FL (ref 8.9–12.7)
POTASSIUM SERPL-SCNC: 4 MMOL/L (ref 3.5–5.3)
RBC # BLD AUTO: 2.73 MILLION/UL (ref 3.88–5.62)
RH BLD: POSITIVE
SODIUM SERPL-SCNC: 137 MMOL/L (ref 136–145)
SPECIMEN EXPIRATION DATE: NORMAL
WBC # BLD AUTO: 5.67 THOUSAND/UL (ref 4.31–10.16)

## 2021-07-11 PROCEDURE — 99232 SBSQ HOSP IP/OBS MODERATE 35: CPT | Performed by: INTERNAL MEDICINE

## 2021-07-11 PROCEDURE — 99232 SBSQ HOSP IP/OBS MODERATE 35: CPT | Performed by: NURSE PRACTITIONER

## 2021-07-11 PROCEDURE — 86923 COMPATIBILITY TEST ELECTRIC: CPT

## 2021-07-11 PROCEDURE — 85027 COMPLETE CBC AUTOMATED: CPT | Performed by: NURSE PRACTITIONER

## 2021-07-11 PROCEDURE — 80048 BASIC METABOLIC PNL TOTAL CA: CPT | Performed by: NURSE PRACTITIONER

## 2021-07-11 PROCEDURE — P9016 RBC LEUKOCYTES REDUCED: HCPCS

## 2021-07-11 PROCEDURE — 86900 BLOOD TYPING SEROLOGIC ABO: CPT | Performed by: PHYSICIAN ASSISTANT

## 2021-07-11 PROCEDURE — 85014 HEMATOCRIT: CPT | Performed by: PHYSICIAN ASSISTANT

## 2021-07-11 PROCEDURE — 85018 HEMOGLOBIN: CPT | Performed by: PHYSICIAN ASSISTANT

## 2021-07-11 PROCEDURE — 86850 RBC ANTIBODY SCREEN: CPT | Performed by: PHYSICIAN ASSISTANT

## 2021-07-11 PROCEDURE — 86901 BLOOD TYPING SEROLOGIC RH(D): CPT | Performed by: PHYSICIAN ASSISTANT

## 2021-07-11 RX ADMIN — METHYLPREDNISOLONE SODIUM SUCCINATE 20 MG: 40 INJECTION, POWDER, FOR SOLUTION INTRAMUSCULAR; INTRAVENOUS at 05:16

## 2021-07-11 RX ADMIN — ACETAMINOPHEN 650 MG: 325 TABLET, FILM COATED ORAL at 22:12

## 2021-07-11 RX ADMIN — SODIUM CHLORIDE 125 ML/HR: 0.9 INJECTION, SOLUTION INTRAVENOUS at 22:14

## 2021-07-11 RX ADMIN — METHYLPREDNISOLONE SODIUM SUCCINATE 20 MG: 40 INJECTION, POWDER, FOR SOLUTION INTRAMUSCULAR; INTRAVENOUS at 21:53

## 2021-07-11 RX ADMIN — Medication 250 MG: at 08:55

## 2021-07-11 RX ADMIN — FERROUS SULFATE TAB 325 MG (65 MG ELEMENTAL FE) 325 MG: 325 (65 FE) TAB at 08:55

## 2021-07-11 RX ADMIN — MESALAMINE 4 G: 4 ENEMA RECTAL at 22:17

## 2021-07-11 RX ADMIN — PANTOPRAZOLE SODIUM 40 MG: 40 TABLET, DELAYED RELEASE ORAL at 05:16

## 2021-07-11 RX ADMIN — SODIUM CHLORIDE 125 ML/HR: 0.9 INJECTION, SOLUTION INTRAVENOUS at 02:50

## 2021-07-11 RX ADMIN — METHYLPREDNISOLONE SODIUM SUCCINATE 20 MG: 40 INJECTION, POWDER, FOR SOLUTION INTRAMUSCULAR; INTRAVENOUS at 14:10

## 2021-07-11 RX ADMIN — MESALAMINE 800 MG: 400 CAPSULE, DELAYED RELEASE ORAL at 21:57

## 2021-07-11 RX ADMIN — MESALAMINE 800 MG: 400 CAPSULE, DELAYED RELEASE ORAL at 08:55

## 2021-07-11 RX ADMIN — Medication 250 MG: at 17:28

## 2021-07-11 RX ADMIN — MESALAMINE 800 MG: 400 CAPSULE, DELAYED RELEASE ORAL at 17:28

## 2021-07-12 ENCOUNTER — TELEPHONE (OUTPATIENT)
Dept: GASTROENTEROLOGY | Facility: CLINIC | Age: 21
End: 2021-07-12

## 2021-07-12 LAB
ABO GROUP BLD BPU: NORMAL
ALBUMIN SERPL BCP-MCNC: 2 G/DL (ref 3.5–5)
ALP SERPL-CCNC: 47 U/L (ref 46–116)
ALT SERPL W P-5'-P-CCNC: 136 U/L (ref 12–78)
ANION GAP SERPL CALCULATED.3IONS-SCNC: 6 MMOL/L (ref 4–13)
AST SERPL W P-5'-P-CCNC: 38 U/L (ref 5–45)
BILIRUB SERPL-MCNC: 0.27 MG/DL (ref 0.2–1)
BPU ID: NORMAL
BUN SERPL-MCNC: 9 MG/DL (ref 5–25)
CALCIUM ALBUM COR SERPL-MCNC: 9.9 MG/DL (ref 8.3–10.1)
CALCIUM SERPL-MCNC: 8.3 MG/DL (ref 8.3–10.1)
CHLORIDE SERPL-SCNC: 106 MMOL/L (ref 100–108)
CO2 SERPL-SCNC: 27 MMOL/L (ref 21–32)
CREAT SERPL-MCNC: 0.66 MG/DL (ref 0.6–1.3)
CROSSMATCH: NORMAL
CRP SERPL QL: 49.7 MG/L
ERYTHROCYTE [DISTWIDTH] IN BLOOD BY AUTOMATED COUNT: 19 % (ref 11.6–15.1)
ERYTHROCYTE [SEDIMENTATION RATE] IN BLOOD: 30 MM/HOUR (ref 0–14)
GFR SERPL CREATININE-BSD FRML MDRD: 138 ML/MIN/1.73SQ M
GLUCOSE SERPL-MCNC: 95 MG/DL (ref 65–140)
HCT VFR BLD AUTO: 24.5 % (ref 36.5–49.3)
HCT VFR BLD AUTO: 25.2 % (ref 36.5–49.3)
HGB BLD-MCNC: 7.4 G/DL (ref 12–17)
HGB BLD-MCNC: 7.8 G/DL (ref 12–17)
MCH RBC QN AUTO: 25.2 PG (ref 26.8–34.3)
MCHC RBC AUTO-ENTMCNC: 30.2 G/DL (ref 31.4–37.4)
MCV RBC AUTO: 83 FL (ref 82–98)
NRBC BLD AUTO-RTO: 0 /100 WBCS
PLATELET # BLD AUTO: 218 THOUSANDS/UL (ref 149–390)
PMV BLD AUTO: 10.5 FL (ref 8.9–12.7)
POTASSIUM SERPL-SCNC: 3.8 MMOL/L (ref 3.5–5.3)
PROT SERPL-MCNC: 5.6 G/DL (ref 6.4–8.2)
RBC # BLD AUTO: 2.94 MILLION/UL (ref 3.88–5.62)
SODIUM SERPL-SCNC: 139 MMOL/L (ref 136–145)
UNIT DISPENSE STATUS: NORMAL
UNIT PRODUCT CODE: NORMAL
UNIT RH: NORMAL
WBC # BLD AUTO: 5.98 THOUSAND/UL (ref 4.31–10.16)

## 2021-07-12 PROCEDURE — 86140 C-REACTIVE PROTEIN: CPT | Performed by: PHYSICIAN ASSISTANT

## 2021-07-12 PROCEDURE — 85018 HEMOGLOBIN: CPT | Performed by: PHYSICIAN ASSISTANT

## 2021-07-12 PROCEDURE — 85652 RBC SED RATE AUTOMATED: CPT | Performed by: PHYSICIAN ASSISTANT

## 2021-07-12 PROCEDURE — 99232 SBSQ HOSP IP/OBS MODERATE 35: CPT | Performed by: PHYSICIAN ASSISTANT

## 2021-07-12 PROCEDURE — NC001 PR NO CHARGE: Performed by: PHYSICIAN ASSISTANT

## 2021-07-12 PROCEDURE — 85027 COMPLETE CBC AUTOMATED: CPT | Performed by: PHYSICIAN ASSISTANT

## 2021-07-12 PROCEDURE — 99232 SBSQ HOSP IP/OBS MODERATE 35: CPT | Performed by: INTERNAL MEDICINE

## 2021-07-12 PROCEDURE — 80053 COMPREHEN METABOLIC PANEL: CPT | Performed by: PHYSICIAN ASSISTANT

## 2021-07-12 PROCEDURE — 85014 HEMATOCRIT: CPT | Performed by: PHYSICIAN ASSISTANT

## 2021-07-12 RX ADMIN — METHYLPREDNISOLONE SODIUM SUCCINATE 20 MG: 40 INJECTION, POWDER, FOR SOLUTION INTRAMUSCULAR; INTRAVENOUS at 21:23

## 2021-07-12 RX ADMIN — METHYLPREDNISOLONE SODIUM SUCCINATE 20 MG: 40 INJECTION, POWDER, FOR SOLUTION INTRAMUSCULAR; INTRAVENOUS at 06:34

## 2021-07-12 RX ADMIN — MESALAMINE 800 MG: 400 CAPSULE, DELAYED RELEASE ORAL at 09:39

## 2021-07-12 RX ADMIN — MESALAMINE 800 MG: 400 CAPSULE, DELAYED RELEASE ORAL at 21:22

## 2021-07-12 RX ADMIN — SODIUM CHLORIDE 125 ML/HR: 0.9 INJECTION, SOLUTION INTRAVENOUS at 23:10

## 2021-07-12 RX ADMIN — SODIUM CHLORIDE 125 ML/HR: 0.9 INJECTION, SOLUTION INTRAVENOUS at 15:25

## 2021-07-12 RX ADMIN — MESALAMINE 4 G: 4 ENEMA RECTAL at 23:09

## 2021-07-12 RX ADMIN — Medication 250 MG: at 09:39

## 2021-07-12 RX ADMIN — PANTOPRAZOLE SODIUM 40 MG: 40 TABLET, DELAYED RELEASE ORAL at 06:34

## 2021-07-12 RX ADMIN — FERROUS SULFATE TAB 325 MG (65 MG ELEMENTAL FE) 325 MG: 325 (65 FE) TAB at 09:39

## 2021-07-12 RX ADMIN — MESALAMINE 800 MG: 400 CAPSULE, DELAYED RELEASE ORAL at 15:26

## 2021-07-12 RX ADMIN — SODIUM CHLORIDE 125 ML/HR: 0.9 INJECTION, SOLUTION INTRAVENOUS at 06:35

## 2021-07-12 RX ADMIN — Medication 250 MG: at 17:27

## 2021-07-12 RX ADMIN — METHYLPREDNISOLONE SODIUM SUCCINATE 20 MG: 40 INJECTION, POWDER, FOR SOLUTION INTRAMUSCULAR; INTRAVENOUS at 15:26

## 2021-07-13 PROBLEM — R74.01 TRANSAMINITIS: Status: ACTIVE | Noted: 2021-07-13

## 2021-07-13 LAB
ALBUMIN SERPL BCP-MCNC: 2 G/DL (ref 3.5–5)
ALP SERPL-CCNC: 64 U/L (ref 46–116)
ALT SERPL W P-5'-P-CCNC: 212 U/L (ref 12–78)
ANION GAP SERPL CALCULATED.3IONS-SCNC: 7 MMOL/L (ref 4–13)
AST SERPL W P-5'-P-CCNC: 54 U/L (ref 5–45)
BACTERIA BLD CULT: NORMAL
BACTERIA BLD CULT: NORMAL
BASOPHILS # BLD MANUAL: 0 THOUSAND/UL (ref 0–0.1)
BASOPHILS NFR MAR MANUAL: 0 % (ref 0–1)
BILIRUB SERPL-MCNC: 0.24 MG/DL (ref 0.2–1)
BUN SERPL-MCNC: 12 MG/DL (ref 5–25)
CALCIUM ALBUM COR SERPL-MCNC: 9.9 MG/DL (ref 8.3–10.1)
CALCIUM SERPL-MCNC: 8.3 MG/DL (ref 8.3–10.1)
CHLORIDE SERPL-SCNC: 103 MMOL/L (ref 100–108)
CO2 SERPL-SCNC: 26 MMOL/L (ref 21–32)
CREAT SERPL-MCNC: 0.62 MG/DL (ref 0.6–1.3)
CRP SERPL QL: 56.6 MG/L
EOSINOPHIL # BLD MANUAL: 0 THOUSAND/UL (ref 0–0.4)
EOSINOPHIL NFR BLD MANUAL: 0 % (ref 0–6)
ERYTHROCYTE [DISTWIDTH] IN BLOOD BY AUTOMATED COUNT: 18.9 % (ref 11.6–15.1)
GFR SERPL CREATININE-BSD FRML MDRD: 142 ML/MIN/1.73SQ M
GLUCOSE SERPL-MCNC: 113 MG/DL (ref 65–140)
HCT VFR BLD AUTO: 25 % (ref 36.5–49.3)
HCT VFR BLD AUTO: 26.5 % (ref 36.5–49.3)
HGB BLD-MCNC: 7.7 G/DL (ref 12–17)
HGB BLD-MCNC: 7.9 G/DL (ref 12–17)
LYMPHOCYTES # BLD AUTO: 1.78 THOUSAND/UL (ref 0.6–4.47)
LYMPHOCYTES # BLD AUTO: 28 % (ref 14–44)
MCH RBC QN AUTO: 25.3 PG (ref 26.8–34.3)
MCHC RBC AUTO-ENTMCNC: 30.8 G/DL (ref 31.4–37.4)
MCV RBC AUTO: 82 FL (ref 82–98)
MONOCYTES # BLD AUTO: 1.14 THOUSAND/UL (ref 0–1.22)
MONOCYTES NFR BLD: 18 % (ref 4–12)
NEUTROPHILS # BLD MANUAL: 3.43 THOUSAND/UL (ref 1.85–7.62)
NEUTS BAND NFR BLD MANUAL: 17 % (ref 0–8)
NEUTS SEG NFR BLD AUTO: 37 % (ref 43–75)
NRBC BLD AUTO-RTO: 0 /100 WBCS
PLATELET # BLD AUTO: 229 THOUSANDS/UL (ref 149–390)
PLATELET BLD QL SMEAR: ADEQUATE
PMV BLD AUTO: 11.6 FL (ref 8.9–12.7)
POTASSIUM SERPL-SCNC: 3.8 MMOL/L (ref 3.5–5.3)
PROT SERPL-MCNC: 5.6 G/DL (ref 6.4–8.2)
RBC # BLD AUTO: 3.04 MILLION/UL (ref 3.88–5.62)
SODIUM SERPL-SCNC: 136 MMOL/L (ref 136–145)
TOTAL CELLS COUNTED SPEC: 100
WBC # BLD AUTO: 6.36 THOUSAND/UL (ref 4.31–10.16)

## 2021-07-13 PROCEDURE — 85027 COMPLETE CBC AUTOMATED: CPT | Performed by: PHYSICIAN ASSISTANT

## 2021-07-13 PROCEDURE — 99232 SBSQ HOSP IP/OBS MODERATE 35: CPT | Performed by: INTERNAL MEDICINE

## 2021-07-13 PROCEDURE — 85018 HEMOGLOBIN: CPT | Performed by: PHYSICIAN ASSISTANT

## 2021-07-13 PROCEDURE — 86140 C-REACTIVE PROTEIN: CPT | Performed by: PHYSICIAN ASSISTANT

## 2021-07-13 PROCEDURE — 85014 HEMATOCRIT: CPT | Performed by: PHYSICIAN ASSISTANT

## 2021-07-13 PROCEDURE — 85007 BL SMEAR W/DIFF WBC COUNT: CPT | Performed by: PHYSICIAN ASSISTANT

## 2021-07-13 PROCEDURE — 80053 COMPREHEN METABOLIC PANEL: CPT | Performed by: PHYSICIAN ASSISTANT

## 2021-07-13 PROCEDURE — NC001 PR NO CHARGE: Performed by: PHYSICIAN ASSISTANT

## 2021-07-13 PROCEDURE — 99232 SBSQ HOSP IP/OBS MODERATE 35: CPT | Performed by: PHYSICIAN ASSISTANT

## 2021-07-13 RX ORDER — HEPARIN SODIUM 5000 [USP'U]/ML
5000 INJECTION, SOLUTION INTRAVENOUS; SUBCUTANEOUS EVERY 8 HOURS SCHEDULED
Status: DISCONTINUED | OUTPATIENT
Start: 2021-07-13 | End: 2021-07-13

## 2021-07-13 RX ORDER — HEPARIN SODIUM 5000 [USP'U]/ML
5000 INJECTION, SOLUTION INTRAVENOUS; SUBCUTANEOUS EVERY 8 HOURS SCHEDULED
Status: DISCONTINUED | OUTPATIENT
Start: 2021-07-13 | End: 2021-07-15 | Stop reason: HOSPADM

## 2021-07-13 RX ADMIN — METHYLPREDNISOLONE SODIUM SUCCINATE 20 MG: 40 INJECTION, POWDER, FOR SOLUTION INTRAMUSCULAR; INTRAVENOUS at 05:51

## 2021-07-13 RX ADMIN — ACETAMINOPHEN 650 MG: 325 TABLET, FILM COATED ORAL at 23:45

## 2021-07-13 RX ADMIN — MESALAMINE 800 MG: 400 CAPSULE, DELAYED RELEASE ORAL at 16:33

## 2021-07-13 RX ADMIN — Medication 250 MG: at 18:36

## 2021-07-13 RX ADMIN — METHYLPREDNISOLONE SODIUM SUCCINATE 20 MG: 40 INJECTION, POWDER, FOR SOLUTION INTRAMUSCULAR; INTRAVENOUS at 21:34

## 2021-07-13 RX ADMIN — MESALAMINE 4 G: 4 ENEMA RECTAL at 21:34

## 2021-07-13 RX ADMIN — METHYLPREDNISOLONE SODIUM SUCCINATE 20 MG: 40 INJECTION, POWDER, FOR SOLUTION INTRAMUSCULAR; INTRAVENOUS at 14:41

## 2021-07-13 RX ADMIN — MESALAMINE 800 MG: 400 CAPSULE, DELAYED RELEASE ORAL at 21:34

## 2021-07-13 RX ADMIN — MESALAMINE 800 MG: 400 CAPSULE, DELAYED RELEASE ORAL at 08:52

## 2021-07-13 RX ADMIN — SODIUM CHLORIDE 125 ML/HR: 0.9 INJECTION, SOLUTION INTRAVENOUS at 08:56

## 2021-07-13 RX ADMIN — PANTOPRAZOLE SODIUM 40 MG: 40 TABLET, DELAYED RELEASE ORAL at 05:50

## 2021-07-13 RX ADMIN — Medication 250 MG: at 08:52

## 2021-07-13 RX ADMIN — SODIUM CHLORIDE 125 ML/HR: 0.9 INJECTION, SOLUTION INTRAVENOUS at 16:06

## 2021-07-13 RX ADMIN — FERROUS SULFATE TAB 325 MG (65 MG ELEMENTAL FE) 325 MG: 325 (65 FE) TAB at 08:55

## 2021-07-14 ENCOUNTER — APPOINTMENT (INPATIENT)
Dept: MRI IMAGING | Facility: HOSPITAL | Age: 21
DRG: 245 | End: 2021-07-14
Payer: COMMERCIAL

## 2021-07-14 LAB
ALBUMIN SERPL BCP-MCNC: 2.1 G/DL (ref 3.5–5)
ALP SERPL-CCNC: 70 U/L (ref 46–116)
ALT SERPL W P-5'-P-CCNC: 208 U/L (ref 12–78)
ANION GAP SERPL CALCULATED.3IONS-SCNC: 7 MMOL/L (ref 4–13)
AST SERPL W P-5'-P-CCNC: 36 U/L (ref 5–45)
BILIRUB SERPL-MCNC: 0.33 MG/DL (ref 0.2–1)
BUN SERPL-MCNC: 13 MG/DL (ref 5–25)
CALCIUM ALBUM COR SERPL-MCNC: 10.2 MG/DL (ref 8.3–10.1)
CALCIUM SERPL-MCNC: 8.7 MG/DL (ref 8.3–10.1)
CHLORIDE SERPL-SCNC: 103 MMOL/L (ref 100–108)
CO2 SERPL-SCNC: 28 MMOL/L (ref 21–32)
CREAT SERPL-MCNC: 0.79 MG/DL (ref 0.6–1.3)
CRP SERPL QL: 64.6 MG/L
ERYTHROCYTE [DISTWIDTH] IN BLOOD BY AUTOMATED COUNT: 18.9 % (ref 11.6–15.1)
GFR SERPL CREATININE-BSD FRML MDRD: 128 ML/MIN/1.73SQ M
GLUCOSE SERPL-MCNC: 107 MG/DL (ref 65–140)
HCT VFR BLD AUTO: 27.6 % (ref 36.5–49.3)
HCV AB SER QL: NORMAL
HGB BLD-MCNC: 8.2 G/DL (ref 12–17)
MCH RBC QN AUTO: 25 PG (ref 26.8–34.3)
MCHC RBC AUTO-ENTMCNC: 29.7 G/DL (ref 31.4–37.4)
MCV RBC AUTO: 84 FL (ref 82–98)
PLATELET # BLD AUTO: 252 THOUSANDS/UL (ref 149–390)
PMV BLD AUTO: 10.8 FL (ref 8.9–12.7)
POTASSIUM SERPL-SCNC: 4.1 MMOL/L (ref 3.5–5.3)
PROT SERPL-MCNC: 6.3 G/DL (ref 6.4–8.2)
RBC # BLD AUTO: 3.28 MILLION/UL (ref 3.88–5.62)
SODIUM SERPL-SCNC: 138 MMOL/L (ref 136–145)
WBC # BLD AUTO: 6.49 THOUSAND/UL (ref 4.31–10.16)

## 2021-07-14 PROCEDURE — 99232 SBSQ HOSP IP/OBS MODERATE 35: CPT | Performed by: PHYSICIAN ASSISTANT

## 2021-07-14 PROCEDURE — 86140 C-REACTIVE PROTEIN: CPT | Performed by: PHYSICIAN ASSISTANT

## 2021-07-14 PROCEDURE — 76377 3D RENDER W/INTRP POSTPROCES: CPT

## 2021-07-14 PROCEDURE — 85027 COMPLETE CBC AUTOMATED: CPT | Performed by: PHYSICIAN ASSISTANT

## 2021-07-14 PROCEDURE — 99232 SBSQ HOSP IP/OBS MODERATE 35: CPT | Performed by: INTERNAL MEDICINE

## 2021-07-14 PROCEDURE — 86235 NUCLEAR ANTIGEN ANTIBODY: CPT | Performed by: PHYSICIAN ASSISTANT

## 2021-07-14 PROCEDURE — 86803 HEPATITIS C AB TEST: CPT | Performed by: PHYSICIAN ASSISTANT

## 2021-07-14 PROCEDURE — 74183 MRI ABD W/O CNTR FLWD CNTR: CPT

## 2021-07-14 PROCEDURE — NC001 PR NO CHARGE: Performed by: PHYSICIAN ASSISTANT

## 2021-07-14 PROCEDURE — 86256 FLUORESCENT ANTIBODY TITER: CPT | Performed by: PHYSICIAN ASSISTANT

## 2021-07-14 PROCEDURE — G1004 CDSM NDSC: HCPCS

## 2021-07-14 PROCEDURE — 80053 COMPREHEN METABOLIC PANEL: CPT | Performed by: PHYSICIAN ASSISTANT

## 2021-07-14 PROCEDURE — A9585 GADOBUTROL INJECTION: HCPCS | Performed by: PHYSICIAN ASSISTANT

## 2021-07-14 PROCEDURE — 82390 ASSAY OF CERULOPLASMIN: CPT | Performed by: PHYSICIAN ASSISTANT

## 2021-07-14 RX ADMIN — GADOBUTROL 10 ML: 604.72 INJECTION INTRAVENOUS at 05:48

## 2021-07-14 RX ADMIN — METHYLPREDNISOLONE SODIUM SUCCINATE 20 MG: 40 INJECTION, POWDER, FOR SOLUTION INTRAMUSCULAR; INTRAVENOUS at 06:20

## 2021-07-14 RX ADMIN — Medication 250 MG: at 10:26

## 2021-07-14 RX ADMIN — MESALAMINE 800 MG: 400 CAPSULE, DELAYED RELEASE ORAL at 10:26

## 2021-07-14 RX ADMIN — HEPARIN SODIUM 5000 UNITS: 5000 INJECTION INTRAVENOUS; SUBCUTANEOUS at 06:20

## 2021-07-14 RX ADMIN — METHYLPREDNISOLONE SODIUM SUCCINATE 20 MG: 40 INJECTION, POWDER, FOR SOLUTION INTRAMUSCULAR; INTRAVENOUS at 21:16

## 2021-07-14 RX ADMIN — MESALAMINE 800 MG: 400 CAPSULE, DELAYED RELEASE ORAL at 14:52

## 2021-07-14 RX ADMIN — HEPARIN SODIUM 5000 UNITS: 5000 INJECTION INTRAVENOUS; SUBCUTANEOUS at 21:17

## 2021-07-14 RX ADMIN — SODIUM CHLORIDE 125 ML/HR: 0.9 INJECTION, SOLUTION INTRAVENOUS at 10:26

## 2021-07-14 RX ADMIN — METHYLPREDNISOLONE SODIUM SUCCINATE 20 MG: 40 INJECTION, POWDER, FOR SOLUTION INTRAMUSCULAR; INTRAVENOUS at 14:52

## 2021-07-14 RX ADMIN — Medication 250 MG: at 17:58

## 2021-07-14 RX ADMIN — FERROUS SULFATE TAB 325 MG (65 MG ELEMENTAL FE) 325 MG: 325 (65 FE) TAB at 10:26

## 2021-07-14 RX ADMIN — HEPARIN SODIUM 5000 UNITS: 5000 INJECTION INTRAVENOUS; SUBCUTANEOUS at 14:52

## 2021-07-14 RX ADMIN — PANTOPRAZOLE SODIUM 40 MG: 40 TABLET, DELAYED RELEASE ORAL at 06:21

## 2021-07-14 RX ADMIN — SODIUM CHLORIDE 125 ML/HR: 0.9 INJECTION, SOLUTION INTRAVENOUS at 18:03

## 2021-07-15 ENCOUNTER — HOSPITAL ENCOUNTER (INPATIENT)
Facility: HOSPITAL | Age: 21
LOS: 18 days | Discharge: HOME HEALTH | DRG: 230 | End: 2021-08-02
Attending: INTERNAL MEDICINE | Admitting: COLON & RECTAL SURGERY
Payer: COMMERCIAL

## 2021-07-15 VITALS
WEIGHT: 214.29 LBS | DIASTOLIC BLOOD PRESSURE: 67 MMHG | HEART RATE: 91 BPM | TEMPERATURE: 99.1 F | SYSTOLIC BLOOD PRESSURE: 130 MMHG | RESPIRATION RATE: 18 BRPM | BODY MASS INDEX: 27.5 KG/M2 | OXYGEN SATURATION: 96 % | HEIGHT: 74 IN

## 2021-07-15 DIAGNOSIS — K51.911 ULCERATIVE COLITIS WITH RECTAL BLEEDING, UNSPECIFIED LOCATION (HCC): ICD-10-CM

## 2021-07-15 DIAGNOSIS — K51.011 ULCERATIVE PANCOLITIS WITH RECTAL BLEEDING (HCC): Primary | ICD-10-CM

## 2021-07-15 LAB
ACTIN IGG SERPL-ACNC: 8 UNITS (ref 0–19)
ALBUMIN SERPL BCP-MCNC: 1.9 G/DL (ref 3.5–5)
ALP SERPL-CCNC: 65 U/L (ref 46–116)
ALT SERPL W P-5'-P-CCNC: 147 U/L (ref 12–78)
ANION GAP SERPL CALCULATED.3IONS-SCNC: 8 MMOL/L (ref 4–13)
AST SERPL W P-5'-P-CCNC: 32 U/L (ref 5–45)
ATRIAL RATE: 94 BPM
BILIRUB SERPL-MCNC: 0.33 MG/DL (ref 0.2–1)
BUN SERPL-MCNC: 15 MG/DL (ref 5–25)
CALCIUM ALBUM COR SERPL-MCNC: 10.2 MG/DL (ref 8.3–10.1)
CALCIUM SERPL-MCNC: 8.5 MG/DL (ref 8.3–10.1)
CERULOPLASMIN SERPL-MCNC: 25.7 MG/DL (ref 16–31)
CHLORIDE SERPL-SCNC: 102 MMOL/L (ref 100–108)
CO2 SERPL-SCNC: 27 MMOL/L (ref 21–32)
CREAT SERPL-MCNC: 0.69 MG/DL (ref 0.6–1.3)
CRP SERPL QL: 74.4 MG/L
ERYTHROCYTE [DISTWIDTH] IN BLOOD BY AUTOMATED COUNT: 18.6 % (ref 11.6–15.1)
GFR SERPL CREATININE-BSD FRML MDRD: 136 ML/MIN/1.73SQ M
GLUCOSE SERPL-MCNC: 111 MG/DL (ref 65–140)
HCT VFR BLD AUTO: 23.7 % (ref 36.5–49.3)
HGB BLD-MCNC: 7.1 G/DL (ref 12–17)
MCH RBC QN AUTO: 24.7 PG (ref 26.8–34.3)
MCHC RBC AUTO-ENTMCNC: 30 G/DL (ref 31.4–37.4)
MCV RBC AUTO: 83 FL (ref 82–98)
MITOCHONDRIA M2 IGG SER-ACNC: <20 UNITS (ref 0–20)
P AXIS: 74 DEGREES
PLATELET # BLD AUTO: 235 THOUSANDS/UL (ref 149–390)
PMV BLD AUTO: 11.1 FL (ref 8.9–12.7)
POTASSIUM SERPL-SCNC: 4.3 MMOL/L (ref 3.5–5.3)
PR INTERVAL: 132 MS
PROT SERPL-MCNC: 5.6 G/DL (ref 6.4–8.2)
QRS AXIS: 77 DEGREES
QRSD INTERVAL: 94 MS
QT INTERVAL: 344 MS
QTC INTERVAL: 430 MS
RBC # BLD AUTO: 2.87 MILLION/UL (ref 3.88–5.62)
SODIUM SERPL-SCNC: 137 MMOL/L (ref 136–145)
T WAVE AXIS: 39 DEGREES
VENTRICULAR RATE: 94 BPM
WBC # BLD AUTO: 7.05 THOUSAND/UL (ref 4.31–10.16)

## 2021-07-15 PROCEDURE — NC001 PR NO CHARGE: Performed by: PHYSICIAN ASSISTANT

## 2021-07-15 PROCEDURE — NC001 PR NO CHARGE: Performed by: NURSE PRACTITIONER

## 2021-07-15 PROCEDURE — 81256 HFE GENE: CPT | Performed by: PHYSICIAN ASSISTANT

## 2021-07-15 PROCEDURE — 99232 SBSQ HOSP IP/OBS MODERATE 35: CPT | Performed by: NURSE PRACTITIONER

## 2021-07-15 PROCEDURE — 86140 C-REACTIVE PROTEIN: CPT | Performed by: PHYSICIAN ASSISTANT

## 2021-07-15 PROCEDURE — 99233 SBSQ HOSP IP/OBS HIGH 50: CPT | Performed by: INTERNAL MEDICINE

## 2021-07-15 PROCEDURE — 93010 ELECTROCARDIOGRAM REPORT: CPT | Performed by: INTERNAL MEDICINE

## 2021-07-15 PROCEDURE — 99223 1ST HOSP IP/OBS HIGH 75: CPT | Performed by: INTERNAL MEDICINE

## 2021-07-15 PROCEDURE — 85027 COMPLETE CBC AUTOMATED: CPT | Performed by: PHYSICIAN ASSISTANT

## 2021-07-15 PROCEDURE — 80053 COMPREHEN METABOLIC PANEL: CPT | Performed by: PHYSICIAN ASSISTANT

## 2021-07-15 RX ORDER — ACETAMINOPHEN 325 MG/1
650 TABLET ORAL EVERY 4 HOURS PRN
Status: CANCELLED | OUTPATIENT
Start: 2021-07-15

## 2021-07-15 RX ORDER — SACCHAROMYCES BOULARDII 250 MG
250 CAPSULE ORAL 2 TIMES DAILY
Status: DISCONTINUED | OUTPATIENT
Start: 2021-07-16 | End: 2021-07-28

## 2021-07-15 RX ORDER — HEPARIN SODIUM 5000 [USP'U]/ML
5000 INJECTION, SOLUTION INTRAVENOUS; SUBCUTANEOUS EVERY 8 HOURS SCHEDULED
Status: CANCELLED | OUTPATIENT
Start: 2021-07-15

## 2021-07-15 RX ORDER — ONDANSETRON 2 MG/ML
4 INJECTION INTRAMUSCULAR; INTRAVENOUS EVERY 4 HOURS PRN
Status: DISCONTINUED | OUTPATIENT
Start: 2021-07-15 | End: 2021-07-28

## 2021-07-15 RX ORDER — PANTOPRAZOLE SODIUM 40 MG/1
40 TABLET, DELAYED RELEASE ORAL
Status: DISCONTINUED | OUTPATIENT
Start: 2021-07-16 | End: 2021-07-28

## 2021-07-15 RX ORDER — METHYLPREDNISOLONE SODIUM SUCCINATE 40 MG/ML
20 INJECTION, POWDER, LYOPHILIZED, FOR SOLUTION INTRAMUSCULAR; INTRAVENOUS EVERY 8 HOURS SCHEDULED
Status: CANCELLED | OUTPATIENT
Start: 2021-07-15

## 2021-07-15 RX ORDER — SODIUM CHLORIDE 9 MG/ML
125 INJECTION, SOLUTION INTRAVENOUS CONTINUOUS
Status: CANCELLED | OUTPATIENT
Start: 2021-07-15

## 2021-07-15 RX ORDER — SACCHAROMYCES BOULARDII 250 MG
250 CAPSULE ORAL 2 TIMES DAILY
Status: CANCELLED | OUTPATIENT
Start: 2021-07-16

## 2021-07-15 RX ORDER — HEPARIN SODIUM 5000 [USP'U]/ML
5000 INJECTION, SOLUTION INTRAVENOUS; SUBCUTANEOUS EVERY 8 HOURS SCHEDULED
Status: DISCONTINUED | OUTPATIENT
Start: 2021-07-15 | End: 2021-07-16

## 2021-07-15 RX ORDER — METHYLPREDNISOLONE SODIUM SUCCINATE 40 MG/ML
20 INJECTION, POWDER, LYOPHILIZED, FOR SOLUTION INTRAMUSCULAR; INTRAVENOUS EVERY 8 HOURS SCHEDULED
Status: DISCONTINUED | OUTPATIENT
Start: 2021-07-15 | End: 2021-07-28

## 2021-07-15 RX ORDER — ACETAMINOPHEN 325 MG/1
650 TABLET ORAL EVERY 4 HOURS PRN
Status: DISCONTINUED | OUTPATIENT
Start: 2021-07-15 | End: 2021-07-28

## 2021-07-15 RX ORDER — FERROUS SULFATE 325(65) MG
325 TABLET ORAL
Status: DISCONTINUED | OUTPATIENT
Start: 2021-07-16 | End: 2021-07-21

## 2021-07-15 RX ORDER — PANTOPRAZOLE SODIUM 40 MG/1
40 TABLET, DELAYED RELEASE ORAL
Status: CANCELLED | OUTPATIENT
Start: 2021-07-16

## 2021-07-15 RX ORDER — FERROUS SULFATE 325(65) MG
325 TABLET ORAL
Status: CANCELLED | OUTPATIENT
Start: 2021-07-16

## 2021-07-15 RX ADMIN — PANTOPRAZOLE SODIUM 40 MG: 40 TABLET, DELAYED RELEASE ORAL at 06:07

## 2021-07-15 RX ADMIN — SODIUM CHLORIDE 125 ML/HR: 0.9 INJECTION, SOLUTION INTRAVENOUS at 18:39

## 2021-07-15 RX ADMIN — METHYLPREDNISOLONE SODIUM SUCCINATE 20 MG: 40 INJECTION, POWDER, FOR SOLUTION INTRAMUSCULAR; INTRAVENOUS at 13:53

## 2021-07-15 RX ADMIN — FERROUS SULFATE TAB 325 MG (65 MG ELEMENTAL FE) 325 MG: 325 (65 FE) TAB at 06:07

## 2021-07-15 RX ADMIN — METHYLPREDNISOLONE SODIUM SUCCINATE 20 MG: 40 INJECTION, POWDER, FOR SOLUTION INTRAMUSCULAR; INTRAVENOUS at 22:54

## 2021-07-15 RX ADMIN — SODIUM CHLORIDE 125 ML/HR: 0.9 INJECTION, SOLUTION INTRAVENOUS at 01:55

## 2021-07-15 RX ADMIN — HEPARIN SODIUM 5000 UNITS: 5000 INJECTION INTRAVENOUS; SUBCUTANEOUS at 22:54

## 2021-07-15 RX ADMIN — Medication 250 MG: at 08:30

## 2021-07-15 RX ADMIN — Medication 250 MG: at 18:38

## 2021-07-15 RX ADMIN — METHYLPREDNISOLONE SODIUM SUCCINATE 20 MG: 40 INJECTION, POWDER, FOR SOLUTION INTRAMUSCULAR; INTRAVENOUS at 06:07

## 2021-07-15 RX ADMIN — SODIUM CHLORIDE 125 ML/HR: 0.9 INJECTION, SOLUTION INTRAVENOUS at 10:35

## 2021-07-16 LAB
ANION GAP SERPL CALCULATED.3IONS-SCNC: 6 MMOL/L (ref 4–13)
BUN SERPL-MCNC: 14 MG/DL (ref 5–25)
CALCIUM SERPL-MCNC: 8.9 MG/DL (ref 8.3–10.1)
CHLORIDE SERPL-SCNC: 103 MMOL/L (ref 100–108)
CO2 SERPL-SCNC: 28 MMOL/L (ref 21–32)
CREAT SERPL-MCNC: 0.49 MG/DL (ref 0.6–1.3)
ERYTHROCYTE [DISTWIDTH] IN BLOOD BY AUTOMATED COUNT: 18.7 % (ref 11.6–15.1)
GFR SERPL CREATININE-BSD FRML MDRD: 156 ML/MIN/1.73SQ M
GLUCOSE SERPL-MCNC: 107 MG/DL (ref 65–140)
HCT VFR BLD AUTO: 25.5 % (ref 36.5–49.3)
HGB BLD-MCNC: 7.5 G/DL (ref 12–17)
MCH RBC QN AUTO: 24.6 PG (ref 26.8–34.3)
MCHC RBC AUTO-ENTMCNC: 29.4 G/DL (ref 31.4–37.4)
MCV RBC AUTO: 84 FL (ref 82–98)
PLATELET # BLD AUTO: 256 THOUSANDS/UL (ref 149–390)
PMV BLD AUTO: 11.2 FL (ref 8.9–12.7)
POTASSIUM SERPL-SCNC: 4.3 MMOL/L (ref 3.5–5.3)
RBC # BLD AUTO: 3.05 MILLION/UL (ref 3.88–5.62)
SODIUM SERPL-SCNC: 137 MMOL/L (ref 136–145)
WBC # BLD AUTO: 6.94 THOUSAND/UL (ref 4.31–10.16)

## 2021-07-16 PROCEDURE — 85027 COMPLETE CBC AUTOMATED: CPT | Performed by: INTERNAL MEDICINE

## 2021-07-16 PROCEDURE — 99254 IP/OBS CNSLTJ NEW/EST MOD 60: CPT | Performed by: INTERNAL MEDICINE

## 2021-07-16 PROCEDURE — 99232 SBSQ HOSP IP/OBS MODERATE 35: CPT | Performed by: NURSE PRACTITIONER

## 2021-07-16 PROCEDURE — 99223 1ST HOSP IP/OBS HIGH 75: CPT | Performed by: COLON & RECTAL SURGERY

## 2021-07-16 PROCEDURE — 80048 BASIC METABOLIC PNL TOTAL CA: CPT | Performed by: INTERNAL MEDICINE

## 2021-07-16 RX ORDER — SODIUM CHLORIDE, SODIUM LACTATE, POTASSIUM CHLORIDE, CALCIUM CHLORIDE 600; 310; 30; 20 MG/100ML; MG/100ML; MG/100ML; MG/100ML
75 INJECTION, SOLUTION INTRAVENOUS CONTINUOUS
Status: DISCONTINUED | OUTPATIENT
Start: 2021-07-16 | End: 2021-07-18

## 2021-07-16 RX ADMIN — PANTOPRAZOLE SODIUM 40 MG: 40 TABLET, DELAYED RELEASE ORAL at 05:18

## 2021-07-16 RX ADMIN — Medication 250 MG: at 08:02

## 2021-07-16 RX ADMIN — HEPARIN SODIUM 5000 UNITS: 5000 INJECTION INTRAVENOUS; SUBCUTANEOUS at 05:18

## 2021-07-16 RX ADMIN — METHYLPREDNISOLONE SODIUM SUCCINATE 20 MG: 40 INJECTION, POWDER, FOR SOLUTION INTRAMUSCULAR; INTRAVENOUS at 05:18

## 2021-07-16 RX ADMIN — SODIUM CHLORIDE, SODIUM LACTATE, POTASSIUM CHLORIDE, AND CALCIUM CHLORIDE 75 ML/HR: .6; .31; .03; .02 INJECTION, SOLUTION INTRAVENOUS at 23:57

## 2021-07-16 RX ADMIN — SODIUM CHLORIDE, SODIUM LACTATE, POTASSIUM CHLORIDE, AND CALCIUM CHLORIDE 75 ML/HR: .6; .31; .03; .02 INJECTION, SOLUTION INTRAVENOUS at 13:21

## 2021-07-16 RX ADMIN — METHYLPREDNISOLONE SODIUM SUCCINATE 20 MG: 40 INJECTION, POWDER, FOR SOLUTION INTRAMUSCULAR; INTRAVENOUS at 23:52

## 2021-07-16 RX ADMIN — Medication 250 MG: at 17:12

## 2021-07-16 RX ADMIN — METHYLPREDNISOLONE SODIUM SUCCINATE 20 MG: 40 INJECTION, POWDER, FOR SOLUTION INTRAMUSCULAR; INTRAVENOUS at 14:11

## 2021-07-16 NOTE — UTILIZATION REVIEW
Initial Clinical Review    Admission: Date/Time/Statement:   Admission Orders (From admission, onward)     Ordered        07/15/21 2203  Inpatient Admission  Once                   Orders Placed This Encounter   Procedures    Inpatient Admission     Standing Status:   Standing     Number of Occurrences:   1     Order Specific Question:   Level of Care     Answer:   Med Surg [16]     Order Specific Question:   Estimated length of stay     Answer:   More than 2 Midnights     Order Specific Question:   Certification     Answer:   I certify that inpatient services are medically necessary for this patient for a duration of greater than two midnights  See H&P and MD Progress Notes for additional information about the patient's course of treatment  Initial Presentation: 25 yo male w/hx ulcerative colitis transferred from [facility] med surg to [address] [place] [address], admitted as inpatient due to ulcerative colitis flare, treated with remicade  Presented to [facility] for a flare and resultant rectal bleed induced anemia requiring 3u prbc's  Continues to have 4 bloody bm's daily despite interventions  Imaging showed worsening colitis and heavy metal deposits near the liver  Hemochromatosis test sent  Decision made to transfer for higher level of care and GI/colorectal evaluation  On arrival, has no complaints  abd soft and nontender  GI and colorectal consulted, serial labs ordered  Providing iron supplements and will transfuse if hgb <7  Date: 7/15   Day 2: appears pale and ill, decreased bowel sounds, c/o LLQ tenderness and loose bloody stools  Has good appetite, no n/v  Continue steroids, IVF, antiemetics, analgesics as needed  Lo fiber/lo residue diet ordered with ensure supplements  Per colorectal: unimproved on IV steroids and s/p 2 doses remicade  Diet as tolerated, transfuse prn, continue serial h&h checks  Per GI: abd mildly tender LLQ, pale color  Intermittently dizzy   continue IV steroids and PPI, consider 3rd remicade dose  Temperature Pulse Respirations Blood Pressure SpO2   07/15/21 2122 07/15/21 2122 07/15/21 2122 07/15/21 2122 07/15/21 2122   99 2 °F (37 3 °C) (!) 108 16 110/65 96 %      Temp Source Heart Rate Source Patient Position - Orthostatic VS BP Location FiO2 (%)   07/15/21 2122 -- -- 07/15/21 2122 --   Oral   Left arm       Pain Score       07/15/21 2136       No Pain          Wt Readings from Last 1 Encounters:   07/15/21 96 4 kg (212 lb 8 4 oz)     Additional Vital Signs: no further vitals available at time of review  Date/Time  Temp  Pulse  Resp  BP  MAP (mmHg)    07/16/21 07:45:11  98 6 °F (37 °C)  --  20  108/65  79        Pertinent Labs/Diagnostic Test Results:     7/14 MRI abd; Normal MRCP    Diffusely decreased T1 and T2 signal consistent with heavy metal deposition such as hemochromatosis    Thickening of the descending colon consistent with ulcerative colitis  No bowel obstruction      Results from last 7 days   Lab Units 07/16/21  0523 07/15/21  0505 07/14/21  0454 07/13/21  2020 07/13/21  0508 07/09/21  2357   WBC Thousand/uL 6 94 7 05 6 49  --  6 36 7 26   HEMOGLOBIN g/dL 7 5* 7 1* 8 2* 7 9* 7 7* 7 7*   HEMATOCRIT % 25 5* 23 7* 27 6* 26 5* 25 0* 25 3*   PLATELETS Thousands/uL 256 235 252  --  229 245   BANDS PCT %  --   --   --   --  17* 7         Results from last 7 days   Lab Units 07/16/21  0523 07/15/21  0505 07/14/21  0455 07/13/21  0508 07/12/21  0731   SODIUM mmol/L 137 137 138 136 139   POTASSIUM mmol/L 4 3 4 3 4 1 3 8 3 8   CHLORIDE mmol/L 103 102 103 103 106   CO2 mmol/L 28 27 28 26 27   ANION GAP mmol/L 6 8 7 7 6   BUN mg/dL 14 15 13 12 9   CREATININE mg/dL 0 49* 0 69 0 79 0 62 0 66   EGFR ml/min/1 73sq m 156 136 128 142 138   CALCIUM mg/dL 8 9 8 5 8 7 8 3 8 3     Results from last 7 days   Lab Units 07/15/21  0505 07/14/21  0455 07/13/21  0508 07/12/21  0731 07/09/21  2357   AST U/L 32 36 54* 38 30   ALT U/L 147* 208* 212* 136* 38   ALK PHOS U/L 65 70 64 47 46   TOTAL PROTEIN g/dL 5 6* 6 3* 5 6* 5 6* 5 5*   ALBUMIN g/dL 1 9* 2 1* 2 0* 2 0* 2 0*   TOTAL BILIRUBIN mg/dL 0 33 0 33 0 24 0 27 0 33         Results from last 7 days   Lab Units 07/16/21  0523 07/15/21  0505 07/14/21  0455 07/13/21  0508 07/12/21  0731 07/11/21  0642 07/10/21  0454 07/09/21  2357   GLUCOSE RANDOM mg/dL 107 111 107 113 95 115 127 127     Results from last 7 days   Lab Units 07/14/21  0454   HEP C AB  Non-reactive         Results from last 7 days   Lab Units 07/15/21  0505 07/14/21  0455 07/13/21  0508 07/12/21  0731 07/10/21  0915   CRP mg/L 74 4* 64 6* 56 6* 49 7* 103 6*   SED RATE mm/hour  --   --   --  30*  --        Results from last 7 days   Lab Units 07/13/21  0508 07/09/21  2357   TOTAL COUNTED  100 100       Past Medical History:   Diagnosis Date    Colitis, ulcerative ([facility] )      Present on Admission:   Ulcerative colitis ([facility] )   Transaminitis   Iron deficiency anemia      Admitting Diagnosis: Ulcerative colitis ([facility] ) [K51 90]  Age/Sex: 24 y o  male  Admission Orders:  Scheduled Medications:  ferrous sulfate, 325 mg, Oral, Daily With Breakfast  methylPREDNISolone sodium succinate, 20 mg, Intravenous, Q8H CALE  pantoprazole, 40 mg, Oral, Early Morning  saccharomyces boulardii, 250 mg, Oral, BID      Continuous IV Infusions:  lactated ringers, 75 mL/hr, Intravenous, Continuous      PRN Meds:  acetaminophen, 650 mg, Oral, Q4H PRN  ondansetron, 4 mg, Intravenous, Q4H PRN    lo fiber/lo residue diet    IP CONSULT TO GASTROENTEROLOGY  IP CONSULT TO COLORECTAL SURGERY    Network Utilization Review Department  ATTENTION: Please call with any questions or concerns to [phone] and carefully listen to the prompts so that you are directed to the right person   All voicemails are confidential   Jasmyn Espinoza all requests for admission clinical reviews, approved or denied determinations and any other requests to dedicated fax number below belonging to the campus where the patient is receiving treatment   List of dedicated fax numbers for the Facilities:  [address] [place] [address] DENIALS (Administrative/Medical Necessity) [phone]   [address] (Maternity/NICU/Pediatrics) [phone]   [address] [place] [address] Dr Kelvin Garrison [id number] [place] [address] Jose Eduardo Jovi [address] [phone]

## 2021-07-16 NOTE — UTILIZATION REVIEW
Inpatient Admission Authorization Request   NOTIFICATION OF INPATIENT ADMISSION/INPATIENT AUTHORIZATION REQUEST   SERVICING FACILITY:   [facility]  Address: [address] [place] [address], [address] [place] [address]  Tax ID: [id number]  NPI: [id number]  Place of Service: Inpatient [address] [facility] Code: 24     ATTENDING PROVIDER:  Attending Name and NPI#: Syd Cosme [[id number]]  Address: [address] [place] [address], [address] [place] [address]  Phone: [phone]     UTILIZATION REVIEW CONTACT:  [facility]ector Stafford Utilization   Network Utilization Review Department  Phone: [phone]  Fax: [phone]  Email: [email or web address]     PHYSICIAN ADVISORY SERVICES:  FOR [facility]-NP[facility] REVIEW - MEDICAL NECESSITY DENIAL  Phone: [phone]  Fax: [phone]  Email: [email or web address]     TYPE OF REQUEST:  Inpatient Status     ADMISSION INFORMATION:  ADMISSION DATE/TIME: 7/15/21  9:24 PM  PATIENT DIAGNOSIS CODE/DESCRIPTION:  Ulcerative colitis ([facility] ) [K51 90]  DISCHARGE DATE/TIME: No discharge date for patient encounter  DISCHARGE DISPOSITION (IF DISCHARGED): [address] [facility]     IMPORTANT INFORMATION:  Please contact the Hector Stafford directly with any questions or concerns regarding this request  Department voicemails are confidential     Send requests for admission clinical reviews, concurrent reviews, approvals, and administrative denials due to lack of clinical to fax [phone]

## 2021-07-16 NOTE — ASSESSMENT & PLAN NOTE
Present on admission, now trending down words   may be due to Remicade infusions   GI  Consulted   MRI/ MRCP showed normal MRCP with heavy metal deposition in the liver, consistent with hemochromatosis   hemochromatosis mutation ordered   hep panel is pending

## 2021-07-16 NOTE — ASSESSMENT & PLAN NOTE
· Elevated ALT, possibly due to blood loss  · MRI/ MRCP showed normal MRCP with heavy metal deposition in the liver, consistent with hemochromatosis  · Hepatitis B and C, mitochondrial and smooth antibodies negative    · hemochromatosis mutation pending

## 2021-07-16 NOTE — ASSESSMENT & PLAN NOTE
· Patient was recently diagnosed with ulcerative colitis, was being treated with mesalamine and Remicade as outpatient  Received 2 doses without much improvement  Presented with syncopal episode  · CT abdomen pelvis showed worsening colitis  · GI following, on intravenous steroids and PPI  · Colorectal surgery following, awaiting further input

## 2021-07-16 NOTE — CONSULTS
Consultation -  [facility]  Eusebia Torres 24 y o  male MRN: [id number]  [facility] Encounter: [id number]              Inpatient consult to gastroenterology     Performed by  Diya Monroy DO     Authorized by Adelita Ellison MD              Reason for Consult / Principal Problem:     Ulcerative colitis flare, transaminitis      ASSESSMENT AND PLAN:      23 y/o male with UC, transaminitis, and possible hemochromatosis presenting with UC flare refractory to Remicade  Ulcerative colitis flare  · He has received two doses of Remicade so far without substantial improvement  · Continue IV steroids and PPI  · Consider third dose of Remicade  · May consider IV cyclosporine if no response to Remicade - Quantiferon negative (6/27/21)  · Magnesium 2 1 on 7/5/21  · Will need to check lipid panel, magnesium to assess eligibility for cyclosporine   · Monitor electrolytes  · DVT prophylaxis  · Await colorectal surgery recommendations    Iron deficiency anemia  · Stable - Hg 7 5  · Low iron at 10, iron panel otherwise WNL (6/22)  · Continue iron supplement  · Monitor Hg and transfuse if Hg < 7 or becomes asymptomatic    Elevated ALT  · Likely due to blood loss  · Continue to monitor liver enzymes  · First occurred on 7/12 at 136, today 147  · INR 1 27 on 7/2  · Hepatitis B and C, mitochondrial and smooth antibodies negative  · Ceruloplasmin level WNL  · Pending hemochromatosis mutation    Hemochromatosis  · Suggested by heavy metal deposition on MRI  · INR 1 27 on 7/2  · Experiencing iron deficiency anemia - no evidence of iron overload currently  · Pending hemochromatosis mutation    ______________________________________________________________________    HPI:  23 y/o male with newly diagnosed ulcerative colitis presents with ulcerative colitis flare  UC diagnosed 3 weeks ago when he presented to [facility] with rectal bleeding    CT abdomen and pelvis with contrast (6/22/21) showed colitis involved the descending and sigmoid colon  Colonoscopy (6/25/21) - mild to severe ulcerative colitis from ascending colon to rectum, biopsies negative for granulomas and dysplasia, CMV negative  He was discharged on mesalamine tablets and enemas, PO prednisone, and PO pantoprazole and GI follow up  On 7/2, he was admitted to [facility] again after passing out during an outpatient blood draw  During admission, he had bloody stools, Hg dropped to 5 8, and he received 3 units of RBC  CT abdomen and pelvis with contrast showed additional affected areas of the colon including the splenic flexure and distal transverse colon  He received IV methylprednisolone, mesalamine enemas, and two doses of Remicade (7/4, 7/10)  MRI with and without contrast showed heavy metal deposition of liver  Transferred to [place] yesterday for colorectal evaluation and further GI management after failing to respond to treatment  Seen today with his mother, Mara [facility]) at beside  He has b/l lower quadrant abdominal pain worse on the left that is exacerbated by movement  Able to eat and drink well  Has up to 4 bloody bowel movements a day  No bowel movements yet today  Reports diaphoresis this morning after he woke up  Describes intermittent hot flashes but not feverish  Experiences dizziness if he stands for longer periods of time (I e  standing on a scale to check his weight)  Denies nausea, vomiting, hematuria, dysuria, chest pain  Family history significant for autoimmune disease on his father's side (alopecia, multiple sclerosis)  Paternal grandmother had liver cancer  His mother thinks that his father may have had hemochromatosis  Maternal side has history of diabetes and HTN  Denies IBD, celiac disease  No surgical history  Hg 7 5 (7 1 on admission)  No leukocytosis  , lbumin 1 9, total protein 5 6, other liver enzymes WNL as of yesterday      INR 1 27 on 7/2  CRP 74 4 yesterday, 49 7 on 7/12, 103 6 on 7/10  Procalcitonin 0 29 (7/9)  Iron 10, otherwise iron panel WNL (6/22)  Hepatitis panel (6/27),C  Diff (7/8), stool panel (7/3), O&P (7/2), blood cultures (7/8) all negative  Quantiferon negative (6/27)  Mitochondrial and smooth muscle antibodies negative (7/14)  Ceruloplasmin WNL at 25 7 (7/14)  Pending hemochromatosis mutation  REVIEW OF SYSTEMS:    CONSTITUTIONAL: Intermittent diaphoresis  Denies any fever, chills, rigors, and weight loss  HEENT: No earache or tinnitus  Denies hearing loss or visual disturbances  CARDIOVASCULAR: No chest pain or palpitations  RESPIRATORY: Denies any cough, hemoptysis, shortness of breath or dyspnea on exertion  GASTROINTESTINAL: As noted in the History of Present Illness  GENITOURINARY: No problems with urination  Denies any hematuria or dysuria  NEUROLOGIC: Dizziness with prolonged standing, denies headaches  MUSCULOSKELETAL: Denies any muscle or joint pain  SKIN: Denies skin rashes or itching  ENDOCRINE: Denies excessive thirst  Denies intolerance to heat or cold  PSYCHOSOCIAL: Denies depression or anxiety  Denies any recent memory loss  Historical Information   Past Medical History:   Diagnosis Date    Colitis, ulcerative ([facility] )      No past surgical history on file  Social History   Social History     Substance and Sexual Activity   Alcohol Use Never    Comment: social use only     Social History     Substance and Sexual Activity   Drug Use Not Currently     Social History     Tobacco Use   Smoking Status Never Smoker   Smokeless Tobacco Never Used     No family history on file      Meds/Allergies     Medications Prior to Admission   Medication    ferrous sulfate 325 (65 Fe) mg tablet    mesalamine (DELZICOL) 400 mg    mesalamine (ROWASA) 4 g    pantoprazole (PROTONIX) 40 mg tablet    predniSONE 20 mg tablet     Current Facility-Administered Medications   Medication Dose Route Frequency    acetaminophen (TYLENOL) tablet 650 mg  650 mg Oral Q4H PRN    ferrous sulfate tablet 325 mg  325 mg Oral Daily With Breakfast    heparin (porcine) subcutaneous injection 5,000 Units  5,000 Units Subcutaneous Q8H [address]    methylPREDNISolone sodium succinate (Solu-MEDROL) injection 20 mg  20 mg Intravenous Q8H [address]    ondansetron (ZOFRAN) injection 4 mg  4 mg Intravenous Q4H PRN    pantoprazole (PROTONIX) EC tablet 40 mg  40 mg Oral Early Morning    saccharomyces boulardii (FLORASTOR) capsule 250 mg  250 mg Oral BID       No Known Allergies        Objective     Blood pressure 108/65, pulse (!) 108, temperature 98 6 °F (37 °C), resp  rate 20, height 6' 3" (1 905 m), weight 96 4 kg (212 lb 8 4 oz), SpO2 96 %  Body mass index is 26 56 kg/m²  No intake or output data in the 24 hours ending 07/16/21 0805      PHYSICAL EXAM:      General Appearance:   Pale, alert, cooperative, no distress   HEENT:   Normocephalic, atraumatic, anicteric      Neck:  Supple, symmetrical, trachea midline   Lungs:   Clear to auscultation bilaterally; no rales, rhonchi or wheezing; respirations unlabored    Heart[de-identified]   Regular rate and rhythm; no murmur, rub, or gallop     Abdomen:   Mildly tender to LLQ, soft, non-distended; normal bowel sounds; no masses, no organomegaly    Genitalia:   Deferred    Rectal:   Deferred    Extremities:  No cyanosis, clubbing or edema    Pulses:  2+ and symmetric all extremities    Skin:  No jaundice, rashes, or lesions          Lab Results:   Admission on 07/15/2021   Component Date Value    WBC 07/16/2021 6 94     RBC 07/16/2021 3 05*    Hemoglobin 07/16/2021 7 5*    Hematocrit 07/16/2021 25 5*    MCV 07/16/2021 84     MCH 07/16/2021 24 6*    MCHC 07/16/2021 29 4*    RDW 07/16/2021 18 7*    Platelets 55/30/7819 256     MPV 07/16/2021 11 2     Sodium 07/16/2021 137     Potassium 07/16/2021 4 3     Chloride 07/16/2021 103     CO2 07/16/2021 28     ANION GAP 07/16/2021 6     BUN 07/16/2021 14     Creatinine 07/16/2021 0 49*    Glucose 07/16/2021 107     Calcium 07/16/2021 8 9     eGFR 07/16/2021 156        Imaging Studies: I have personally reviewed pertinent imaging studies

## 2021-07-16 NOTE — CONSULTS
Consultation - Colorectal Surgery  Nu Vidales 24 y o  male MRN: [id number]  Unit/Bed#: [facility] 816-01 Encounter: [id number]        Assessment/Plan     Assessment:  24 M w/ Ulcerative colitits and no significant improvement on IV steroids and Remicade x2    Plan:  · Monitor H&H  · Transfuse as needed  · Diet as tolerated  · Will discuss further operative planning with the attending  · Please please tiger text on call white surgery resident for any further questions    History of Present Illness     HPI:  Nu Vidales is a 24 y o  male with little past medical history  He presented to [facility] on 06/23 for significant rectal bleeding  Gastroenterology was consulted  A colonoscopy was performed on 06/25 with findings concerning for ulcerative colitis  See below for findings  History initially of steroids and mesalamine  Experience improvement of the symptoms on these medications  He was discharged on 6/30 but readmitted on 07/02 following a syncopal episode and multiple bloody bowel movements  He was started on Remicade on 7/6  He has however not had very significant improvement on his current medication regimen  He also continues to have down trending hemoglobin receiving a total of 3 units packed red blood cells at [facility]  He was transferred to [facility] for evaluation from Gastroenterology and Colorectal surgery  6/25 Cscope:Severe edematous, erythematous, hemorrhagic and ulcerated mucosa with loss of vascular pattern, consistent with ulcerative colitis in the sigmoid colon, rectosigmoid and rectum  The colitis was most intense from 50 cm down to the anal verge  Review of Systems   Constitutional: Negative for chills and fever  HENT: Negative for ear pain and sore throat  Eyes: Negative for pain and visual disturbance  Respiratory: Negative for cough and shortness of breath  Cardiovascular: Negative for chest pain and palpitations     Gastrointestinal: Negative for abdominal pain and vomiting  Genitourinary: Negative for dysuria and hematuria  Musculoskeletal: Negative for arthralgias and back pain  Skin: Negative for color change and rash  Neurological: Negative for seizures and syncope  Psychiatric/Behavioral: Negative for agitation, behavioral problems and confusion  All other systems reviewed and are negative  Historical Information   Past Medical History:   Diagnosis Date    Colitis, ulcerative ([facility] [address] )      No past surgical history on file  Social History   Social History     Substance and Sexual Activity   Alcohol Use Never    Comment: social use only     Social History     Substance and Sexual Activity   Drug Use Not Currently     Social History     Tobacco Use   Smoking Status Never Smoker   Smokeless Tobacco Never Used     Family History: No family history on file  Meds/Allergies   all medications and allergies reviewed  No Known Allergies    Objective   First Vitals:   Blood Pressure: 110/65 (07/15/21 2122)  Pulse: (!) 108 (07/15/21 2122)  Temperature: 99 2 °F (37 3 °C) (07/15/21 2122)  Temp Source: Oral (07/15/21 2122)  Respirations: 16 (07/15/21 2122)  Height: 6' 3" (190 5 cm) (07/15/21 2122)  Weight - Scale: 96 4 kg (212 lb 8 4 oz) (07/15/21 2122)  SpO2: 96 % (07/15/21 2122)    Current Vitals:   Blood Pressure: 110/65 (07/15/21 2122)  Pulse: (!) 108 (07/15/21 2122)  Temperature: 99 2 °F (37 3 °C) (07/15/21 2122)  Temp Source: Oral (07/15/21 2122)  Respirations: 16 (07/15/21 2122)  Height: 6' 3" (190 5 cm) (07/15/21 2122)  Weight - Scale: 96 4 kg (212 lb 8 4 oz) (07/15/21 2122)  SpO2: 96 % (07/15/21 2122)    No intake or output data in the 24 hours ending 07/16/21 0129    Invasive Devices     Peripheral Intravenous Line            Peripheral IV 07/13/21 Left;Proximal;Ventral (anterior) Forearm 2 days                Physical Exam  Vitals reviewed  Constitutional:       General: He is not in acute distress  Appearance: He is not toxic-appearing     HENT: Head: Normocephalic and atraumatic  Right Ear: External ear normal       Left Ear: External ear normal       Nose: Nose normal       Mouth/Throat:      Mouth: Mucous membranes are moist       Pharynx: Oropharynx is clear  Eyes:      Extraocular Movements: Extraocular movements intact  Conjunctiva/sclera: Conjunctivae normal       Pupils: Pupils are equal, round, and reactive to light  Cardiovascular:      Rate and Rhythm: Normal rate and regular rhythm  Pulmonary:      Effort: Pulmonary effort is normal  No respiratory distress  Abdominal:      General: Abdomen is flat  There is no distension  Palpations: Abdomen is soft  Tenderness: There is no abdominal tenderness  Musculoskeletal:         General: No swelling or deformity  Normal range of motion  Cervical back: Neck supple  No rigidity  Skin:     General: Skin is warm and dry  Capillary Refill: Capillary refill takes less than 2 seconds  Findings: No erythema  Neurological:      General: No focal deficit present  Mental Status: He is oriented to person, place, and time  Psychiatric:         Mood and Affect: Mood normal          Behavior: Behavior normal            Lab Results: I have personally reviewed pertinent lab results  Imaging: I have personally reviewed pertinent reports  EKG, Pathology, and Other Studies: I have personally reviewed pertinent reports        Code Status: Level 1 - Full Code  Advance Directive and Living Will:      Power of :    POLST:

## 2021-07-16 NOTE — H&P
[address] [facility]  H&P- Dashawn Warren 2000, 24 y o  male MRN: [id number]  Unit/Bed#: [facility] 816-01 Encounter: [id number]  Primary Care Provider: Jamee Garner DO   Date and time admitted to hospital: 7/15/2021  9:24 PM    Transaminitis  Assessment & Plan   Present on admission, now trending down words   may be due to Remicade infusions   GI  Consulted   MRI/ MRCP showed normal MRCP with heavy metal deposition in the liver, consistent with hemochromatosis   hemochromatosis mutation ordered   hep panel is pending    Ulcerative colitis [facility])  Assessment & Plan   Patient was recently diagnosed with ulcerative colitis, was being treated with mesalamine steroids in Protonix in outpatient setting   he presented after a syncopal episode  CT of the abdomen pelvis showed worsening colitis   patient has had multiple bloody bowel movements, he states about four daily   received 2 Remicade infusions on 7/4 and 7/10   continue Solu-Medrol   gastroenterology consulted   Colorectal surgery consulted   monitor H&H and transfuse for hemoglobin less than 7    Iron deficiency anemia  Assessment & Plan   Continue iron supplementation   monitor H&H      VTE Pharmacologic Prophylaxis: VTE Score: 0 Low Risk (Score 0-2) - Encourage Ambulation  Code Status: Level 1 - Full Code  Patient and mother  Discussion with family: Updated  (mother) at bedside  Anticipated Length of Stay: Patient will be admitted on an inpatient basis with an anticipated length of stay of greater than 2 midnights secondary to Gastroenterology and Colorectal surgery evaluations  Total Time for Visit, including Counseling / Coordination of Care: 60 minutes Greater than 50% of this total time spent on direct patient counseling and coordination of care      Chief Complaint:  Ulcerative colitis flare    History of Present Illness:  Dashawn Warren is a 24 y o  male with a PMH of  Ulcerative colitis who presents with Ulcerative colitis flare  [facility] for treatment of ulcerative colitis flare and resulting anemia due to rectal bleeding  He has received about 3 units PRBCs  Despite receiving Remicade and steroids, patient continues to have about four bloody bowel movements per day  Thus GI recommended transfer to [facility] for evaluation by Gastroenterology and  Colorectal surgery  Currently patient has no complaints  He denies any shortness of breath, chest pain, lightheadedness, nausea, abdominal pain  He is agreeable to further blood transfusions if needed, consent was obtained and placed on the chart  Review of Systems:  Review of Systems   Constitutional: Negative for activity change, appetite change, chills, diaphoresis, fatigue and fever  HENT: Negative for congestion, rhinorrhea, sinus pressure, sinus pain and sore throat  Eyes: Negative  Respiratory: Negative for cough, chest tightness and shortness of breath  Cardiovascular: Negative for chest pain, palpitations and leg swelling  Gastrointestinal: Negative for abdominal distention, abdominal pain, constipation, diarrhea, nausea and vomiting  Endocrine: Negative  Genitourinary: Negative for difficulty urinating, dysuria, flank pain, frequency, hematuria and urgency  Musculoskeletal: Negative for back pain, gait problem and neck pain  Skin: Negative  Allergic/Immunologic: Negative  Neurological: Negative for dizziness, syncope, speech difficulty, weakness, light-headedness and headaches  Hematological: Negative  Psychiatric/Behavioral: Negative  All other systems reviewed and are negative  Past Medical and Surgical History:   Past Medical History:   Diagnosis Date    Colitis, ulcerative ([facility] [address] )        No past surgical history on file  Meds/Allergies:  Prior to Admission medications    Medication Sig Start Date End Date Taking?  Authorizing Provider   ferrous sulfate 325 (65 Fe) mg tablet Take 1 tablet (325 mg total) by mouth daily with breakfast 7/1/21 7/31/21  Mingo Dee MD   mesalamine (DELZICOL) 400 mg Take 2 capsules (800 mg total) by mouth 3 (three) times a day 6/30/21 7/30/21  Mingo Dee MD   mesalamine (ROWASA) 4 g Insert 60 mL (4 g total) into the rectum daily at bedtime 6/30/21 7/30/21  Mingo Dee MD   pantoprazole (PROTONIX) 40 mg tablet Take 1 tablet (40 mg total) by mouth daily in the early morning 7/1/21 7/31/21  Mingo Dee MD   predniSONE 20 mg tablet Take 2 tablets (40 mg total) by mouth daily 6/30/21 7/30/21  Mingo Dee MD     I have reviewed home medications with patient family member  Allergies: No Known Allergies    Social History:  Marital Status: Single   Occupation:   Does not work  Patient Pre-hospital Living Situation: Home  Patient Pre-hospital Level of Mobility: walks  Patient Pre-hospital Diet Restrictions:  none  Substance Use History:   Social History     Substance and Sexual Activity   Alcohol Use Never    Comment: social use only     Social History     Tobacco Use   Smoking Status Never Smoker   Smokeless Tobacco Never Used     Social History     Substance and Sexual Activity   Drug Use Not Currently       Family History:  No family history on file  Physical Exam:     Vitals:   Blood Pressure: 110/65 (07/15/21 2122)  Pulse: (!) 108 (07/15/21 2122)  Temperature: 99 2 °F (37 3 °C) (07/15/21 2122)  Temp Source: Oral (07/15/21 2122)  Respirations: 16 (07/15/21 2122)  Height: 6' 3" (190 5 cm) (07/15/21 2122)  Weight - Scale: 96 4 kg (212 lb 8 4 oz) (07/15/21 2122)  SpO2: 96 % (07/15/21 2122)    Physical Exam  Vitals and nursing note reviewed  Constitutional:       Appearance: Normal appearance  He is normal weight  HENT:      Head: Normocephalic and atraumatic  Right Ear: External ear normal       Left Ear: External ear normal       Nose: Nose normal       Mouth/Throat:      Mouth: Mucous membranes are moist       Pharynx: Oropharynx is clear  Eyes:      Conjunctiva/sclera: Conjunctivae normal       Pupils: Pupils are equal, round, and reactive to light  Cardiovascular:      Rate and Rhythm: Normal rate and regular rhythm  Pulses: Normal pulses  Heart sounds: Normal heart sounds  Pulmonary:      Effort: Pulmonary effort is normal       Breath sounds: Normal breath sounds  Abdominal:      General: Abdomen is flat  Bowel sounds are normal       Palpations: Abdomen is soft  Musculoskeletal:         General: No swelling or tenderness  Cervical back: Neck supple  No muscular tenderness  Skin:     General: Skin is warm and dry  Capillary Refill: Capillary refill takes less than 2 seconds  Neurological:      General: No focal deficit present  Mental Status: He is alert and oriented to person, place, and time  Mental status is at baseline  Psychiatric:         Mood and Affect: Mood normal          Behavior: Behavior normal          Thought Content: Thought content normal          Judgment: Judgment normal           Additional Data:     Lab Results:  Results from last 7 days   Lab Units 07/15/21  0505 07/13/21  2020 07/13/21  0508   WBC Thousand/uL 7 05   < > 6 36   HEMOGLOBIN g/dL 7 1*  --  7 7*   HEMATOCRIT % 23 7*  --  25 0*   PLATELETS Thousands/uL 235   < > 229   BANDS PCT %  --   --  17*   LYMPHO PCT %  --   --  28   MONO PCT %  --   --  18*   EOS PCT %  --   --  0    < > = values in this interval not displayed       Results from last 7 days   Lab Units 07/15/21  0505   SODIUM mmol/L 137   POTASSIUM mmol/L 4 3   CHLORIDE mmol/L 102   CO2 mmol/L 27   BUN mg/dL 15   CREATININE mg/dL 0 69   ANION GAP mmol/L 8   CALCIUM mg/dL 8 5   ALBUMIN g/dL 1 9*   TOTAL BILIRUBIN mg/dL 0 33   ALK PHOS U/L 65   ALT U/L 147*   AST U/L 32   GLUCOSE RANDOM mg/dL 111                 Results from last 7 days   Lab Units 07/09/21  0543   PROCALCITONIN ng/ml 0 29*       Imaging: Reviewed radiology reports from this admission including: abdominal/pelvic CT and MRI abdomen/MRCP  No orders to display       EKG and Other Studies Reviewed on Admission:   · EKG: No EKG obtained  ** Please Note: This note has been constructed using a voice recognition system   **

## 2021-07-16 NOTE — ASSESSMENT & PLAN NOTE
Patient was recently diagnosed with ulcerative colitis, was being treated with mesalamine steroids in Protonix in outpatient setting   he presented after a syncopal episode    CT of the abdomen pelvis showed worsening colitis   patient has had multiple bloody bowel movements, he states about four daily   received 2 Remicade infusions on 7/4 and 7/10   continue Solu-Medrol   gastroenterology consulted   Colorectal surgery consulted   monitor H&H and transfuse for hemoglobin less than 7

## 2021-07-16 NOTE — PROGRESS NOTES
[facility] [facility]  Progress Note - Benji Antonio 2000, 24 y o  male MRN: [id number]  Unit/Bed#: [facility] 816-01 Encounter: [id number]  Primary Care Provider: Ryan Jeans, DO   Date and time admitted to hospital: 7/15/2021  9:24 PM    * Ulcerative colitis [facility])  Assessment & Plan  · Patient was recently diagnosed with ulcerative colitis, was being treated with mesalamine and Remicade as outpatient  Received 2 doses without much improvement  Presented with syncopal episode  · CT abdomen pelvis showed worsening colitis  · GI following, on intravenous steroids and PPI  · Colorectal surgery following, awaiting further input  Transaminitis  Assessment & Plan  · Elevated ALT, possibly due to blood loss  · MRI/ MRCP showed normal MRCP with heavy metal deposition in the liver, consistent with hemochromatosis  · Hepatitis B and C, mitochondrial and smooth antibodies negative  · hemochromatosis mutation pending    Iron deficiency anemia  Assessment & Plan  · Continue iron supplementation        VTE Pharmacologic Prophylaxis: VTE Score: 0 Low Risk (Score 0-2) - Encourage Ambulation  Patient Centered Rounds: I performed bedside rounds with nursing staff today  Discussions with Specialists or Other Care Team Provider: nursing, gi, colorectal    Message sent to Dr Harini Carrington per patient/mother request to inform him of admission at [facility]  Education and Discussions with Family / Patient: Updated  (mother) at bedside  Time Spent for Care: 30 minutes  More than 50% of total time spent on counseling and coordination of care as described above      Current Length of Stay: 1 day(s)  Current Patient Status: Inpatient   Certification Statement: The patient will continue to require additional inpatient hospital stay due to IV steroids, GI/colorectal input  Discharge Plan: Pending GI clearance, symptomatic improvement    Code Status: Level 1 - Full Code    Subjective: Patient continues to complain of some left lower quadrant tenderness and loose bloody stools  Appetite is good  No nausea or vomiting  Objective:     Vitals:   Temp (24hrs), Av °F (37 2 °C), Min:98 6 °F (37 °C), Max:99 2 °F (37 3 °C)    Temp:  [98 6 °F (37 °C)-99 2 °F (37 3 °C)] 98 6 °F (37 °C)  HR:  [] 108  Resp:  [16-20] 20  BP: (108-130)/(65-67) 108/65  SpO2:  [96 %] 96 %  Body mass index is 26 56 kg/m²  Input and Output Summary (last 24 hours):   No intake or output data in the 24 hours ending 21 1056    Physical Exam:   Physical Exam  Vitals and nursing note reviewed  Constitutional:       Appearance: He is ill-appearing  Cardiovascular:      Rate and Rhythm: Normal rate  Pulmonary:      Breath sounds: Normal breath sounds  Abdominal:      General: Bowel sounds are decreased  Tenderness: There is abdominal tenderness (Left lower quadrant)  Musculoskeletal:         General: No swelling  Skin:     Coloration: Skin is pale  Neurological:      Mental Status: He is alert and oriented to person, place, and time  Mental status is at baseline  Psychiatric:         Mood and Affect: Mood normal           Additional Data:     Labs:  Results from last 7 days   Lab Units 21  0521  0508   WBC Thousand/uL 6 94   < > 6 36   HEMOGLOBIN g/dL 7 5*  --  7 7*   HEMATOCRIT % 25 5*  --  25 0*   PLATELETS Thousands/uL 256   < > 229   BANDS PCT %  --   --  17*   LYMPHO PCT %  --   --  28   MONO PCT %  --   --  18*   EOS PCT %  --   --  0    < > = values in this interval not displayed       Results from last 7 days   Lab Units 21  0523 07/15/21  0505   SODIUM mmol/L 137 137   POTASSIUM mmol/L 4 3 4 3   CHLORIDE mmol/L 103 102   CO2 mmol/L 28 27   BUN mg/dL 14 15   CREATININE mg/dL 0 49* 0 69   ANION GAP mmol/L 6 8   CALCIUM mg/dL 8 9 8 5   ALBUMIN g/dL  --  1 9*   TOTAL BILIRUBIN mg/dL  --  0 33   ALK PHOS U/L  --  65   ALT U/L  --  147*   AST U/L  --  32 GLUCOSE RANDOM mg/dL 107 111                       Lines/Drains:  Invasive Devices     Peripheral Intravenous Line            Peripheral IV 07/13/21 Left;Proximal;Ventral (anterior) Forearm 2 days                      Imaging: No pertinent imaging reviewed  Recent Cultures (last 7 days):         Last 24 Hours Medication List:   Current Facility-Administered Medications   Medication Dose Route Frequency Provider Last Rate    acetaminophen  650 mg Oral Q4H PRN Alejandrina Walsh MD      ferrous sulfate  325 mg Oral Daily With Breakfast Alejandrina Walsh MD      heparin (porcine)  5,000 Units Subcutaneous [facility] Alejandrina Walsh MD      methylPREDNISolone sodium succinate  20 mg Intravenous [facility] Alejandrina Walsh MD      ondansetron  4 mg Intravenous Q4H PRN Alejandrina Walsh MD      pantoprazole  40 mg Oral Early Morning Alejandrina Walsh MD      saccharomyces boulardii  250 mg Oral BID Alejandrina Walsh MD          Today, Patient Was Seen By: J CARLOS Smyth    **Please Note: This note may have been constructed using a voice recognition system  **

## 2021-07-17 PROBLEM — D62 ABLA (ACUTE BLOOD LOSS ANEMIA): Status: ACTIVE | Noted: 2021-07-17

## 2021-07-17 LAB
ALBUMIN SERPL BCP-MCNC: 1.9 G/DL (ref 3.5–5)
ALP SERPL-CCNC: 71 U/L (ref 46–116)
ALT SERPL W P-5'-P-CCNC: 124 U/L (ref 12–78)
ANION GAP SERPL CALCULATED.3IONS-SCNC: 6 MMOL/L (ref 4–13)
AST SERPL W P-5'-P-CCNC: 22 U/L (ref 5–45)
BASOPHILS # BLD AUTO: 0.02 THOUSANDS/ΜL (ref 0–0.1)
BASOPHILS NFR BLD AUTO: 0 % (ref 0–1)
BILIRUB SERPL-MCNC: 0.41 MG/DL (ref 0.2–1)
BUN SERPL-MCNC: 15 MG/DL (ref 5–25)
CALCIUM ALBUM COR SERPL-MCNC: 10.4 MG/DL (ref 8.3–10.1)
CALCIUM SERPL-MCNC: 8.7 MG/DL (ref 8.3–10.1)
CHLORIDE SERPL-SCNC: 104 MMOL/L (ref 100–108)
CO2 SERPL-SCNC: 27 MMOL/L (ref 21–32)
CREAT SERPL-MCNC: 0.51 MG/DL (ref 0.6–1.3)
EOSINOPHIL # BLD AUTO: 0.05 THOUSAND/ΜL (ref 0–0.61)
EOSINOPHIL NFR BLD AUTO: 1 % (ref 0–6)
ERYTHROCYTE [DISTWIDTH] IN BLOOD BY AUTOMATED COUNT: 18.9 % (ref 11.6–15.1)
GFR SERPL CREATININE-BSD FRML MDRD: 154 ML/MIN/1.73SQ M
GLUCOSE SERPL-MCNC: 98 MG/DL (ref 65–140)
HCT VFR BLD AUTO: 23.6 % (ref 36.5–49.3)
HGB BLD-MCNC: 7 G/DL (ref 12–17)
IMM GRANULOCYTES # BLD AUTO: 0.1 THOUSAND/UL (ref 0–0.2)
IMM GRANULOCYTES NFR BLD AUTO: 2 % (ref 0–2)
LYMPHOCYTES # BLD AUTO: 1.47 THOUSANDS/ΜL (ref 0.6–4.47)
LYMPHOCYTES NFR BLD AUTO: 22 % (ref 14–44)
MCH RBC QN AUTO: 24.6 PG (ref 26.8–34.3)
MCHC RBC AUTO-ENTMCNC: 29.7 G/DL (ref 31.4–37.4)
MCV RBC AUTO: 83 FL (ref 82–98)
MONOCYTES # BLD AUTO: 0.77 THOUSAND/ΜL (ref 0.17–1.22)
MONOCYTES NFR BLD AUTO: 12 % (ref 4–12)
NEUTROPHILS # BLD AUTO: 4.22 THOUSANDS/ΜL (ref 1.85–7.62)
NEUTS SEG NFR BLD AUTO: 63 % (ref 43–75)
NRBC BLD AUTO-RTO: 1 /100 WBCS
PLATELET # BLD AUTO: 261 THOUSANDS/UL (ref 149–390)
PMV BLD AUTO: 11.1 FL (ref 8.9–12.7)
POTASSIUM SERPL-SCNC: 4.3 MMOL/L (ref 3.5–5.3)
PROT SERPL-MCNC: 6.2 G/DL (ref 6.4–8.2)
RBC # BLD AUTO: 2.84 MILLION/UL (ref 3.88–5.62)
SODIUM SERPL-SCNC: 137 MMOL/L (ref 136–145)
WBC # BLD AUTO: 6.63 THOUSAND/UL (ref 4.31–10.16)

## 2021-07-17 PROCEDURE — 99233 SBSQ HOSP IP/OBS HIGH 50: CPT | Performed by: COLON & RECTAL SURGERY

## 2021-07-17 PROCEDURE — 99232 SBSQ HOSP IP/OBS MODERATE 35: CPT | Performed by: NURSE PRACTITIONER

## 2021-07-17 PROCEDURE — 80053 COMPREHEN METABOLIC PANEL: CPT | Performed by: NURSE PRACTITIONER

## 2021-07-17 PROCEDURE — 99232 SBSQ HOSP IP/OBS MODERATE 35: CPT | Performed by: INTERNAL MEDICINE

## 2021-07-17 PROCEDURE — 85025 COMPLETE CBC W/AUTO DIFF WBC: CPT | Performed by: NURSE PRACTITIONER

## 2021-07-17 RX ADMIN — Medication 250 MG: at 17:02

## 2021-07-17 RX ADMIN — SODIUM CHLORIDE, SODIUM LACTATE, POTASSIUM CHLORIDE, AND CALCIUM CHLORIDE 75 ML/HR: .6; .31; .03; .02 INJECTION, SOLUTION INTRAVENOUS at 13:04

## 2021-07-17 RX ADMIN — ACETAMINOPHEN 650 MG: 325 TABLET, FILM COATED ORAL at 22:32

## 2021-07-17 RX ADMIN — FERROUS SULFATE TAB 325 MG (65 MG ELEMENTAL FE) 325 MG: 325 (65 FE) TAB at 08:42

## 2021-07-17 RX ADMIN — PANTOPRAZOLE SODIUM 40 MG: 40 TABLET, DELAYED RELEASE ORAL at 06:22

## 2021-07-17 RX ADMIN — Medication 250 MG: at 08:42

## 2021-07-17 RX ADMIN — METHYLPREDNISOLONE SODIUM SUCCINATE 20 MG: 40 INJECTION, POWDER, FOR SOLUTION INTRAMUSCULAR; INTRAVENOUS at 06:22

## 2021-07-17 RX ADMIN — METHYLPREDNISOLONE SODIUM SUCCINATE 20 MG: 40 INJECTION, POWDER, FOR SOLUTION INTRAMUSCULAR; INTRAVENOUS at 13:04

## 2021-07-17 NOTE — PROGRESS NOTES
Progress Note - Colorectal Surgery  Benji Antonio 24 y o  male MRN: [id number]  Unit/Bed#: [facility] 816-01 Encounter: [id number]    Assessment:   24 M w/ Ulcerative colitits and no significant improvement on IV steroids and Remicade  Plan:  - Diet GI; Lo Fiber/Lo Residue and Ensures  - Pain and Nausea control PRN  - OOB, ambulate  - Continue management per GI with steroid and Remicade  - No surgical intervention at this time  - Monitor Hgb    Subjective/Objective     Subjective: The patients pain is well controlled and the patient denies any nausea  He is having some maroon colored stools      Objective:   Vitals: Blood pressure 123/65, pulse 89, temperature 98 2 °F (36 8 °C), resp  rate 17, height 6' 3" (1 905 m), weight 96 4 kg (212 lb 8 4 oz), SpO2 98 %  ,Body mass index is 26 56 kg/m²  I/O       07/15 0701 - 07/16 0700 07/16 0701 - 07/17 0700    P  O   596    I V  (mL/kg)  793 8 (8 2)    Total Intake(mL/kg)  1389 8 (14 4)    Urine (mL/kg/hr)  0 (0)    Stool  600    Total Output  600    Net  +789 8          Unmeasured Urine Occurrence 1 x 1 x    Unmeasured Stool Occurrence 0 x           Physical Exam:  Gen: NAD, Aox3, Comfortable in bed  Chest: Normal work of breathing, no respiratory distress  Abd: Soft, ND, RLQ mildly tender  Ext: No Edema  Skin: Warm, Dry, Intact      Lab, Imaging and other studies:   I have personally reviewed pertinent reports    , CBC with diff:   Lab Results   Component Value Date    WBC 6 94 07/16/2021    HGB 7 5 (L) 07/16/2021    HCT 25 5 (L) 07/16/2021    MCV 84 07/16/2021     07/16/2021    MCH 24 6 (L) 07/16/2021    MCHC 29 4 (L) 07/16/2021    RDW 18 7 (H) 07/16/2021    MPV 11 2 07/16/2021   , BMP/CMP:   Lab Results   Component Value Date    SODIUM 137 07/16/2021    K 4 3 07/16/2021     07/16/2021    CO2 28 07/16/2021    BUN 14 07/16/2021    CREATININE 0 49 (L) 07/16/2021    CALCIUM 8 9 07/16/2021    EGFR 156 07/16/2021     VTE Pharmacologic Prophylaxis: Heparin  VTE Mechanical Prophylaxis: sequential compression device        Pooja Fernandez MD  7/17/2021  5:10 a m

## 2021-07-17 NOTE — PROGRESS NOTES
Progress Note- Chaya Marrero 24 y o  male MRN: [id number]    Unit/Bed#: [facility] 816-01 Encounter: [id number]      Assessment and Plan:    80-year-old with severe ulcerative pancolitis, status post Remicade x2 with last dose about 1 week ago, currently on IV steroids  Patient reports decreased frequency of bowel movements, significant improvement in abdominal pain and tolerance of diet  1  Severe ulcerative colitis  Status post 2 doses of Remicade during this hospitalization  Currently on IV steroids  Negative infectious workup  Negative for CMV on colonoscopy biopsy during current hospitalization  Patient tolerating low residue diet  Continue current treatment  If symptoms worsen then options include colectomy versus trial with cyclosporine  Will reassess on Monday  2  Abnormal LFTs  Unclear etiology  Resolving  Liver workup blood work unrevealing  MRI/MRCP negative for biliary pathology  Could be medication related liver injury which is resolving  Gearlisa Vazquez MD  Gastroenterology Fellow  [address]  Date: July 17, 2021    ______________________________________________________________________    Subjective:     Patient tolerating diet  Had 2 bowel movements overnight with some blood in them      Medication Administration - last 24 hours from 07/16/2021 1256 to 07/17/2021 1256       Date/Time Order Dose Route Action Action by     07/17/2021 0842 ferrous sulfate tablet 325 mg 325 mg Oral Given Sd Robb RN     07/17/2021 0622 pantoprazole (PROTONIX) EC tablet 40 mg 40 mg Oral Given Karmen Kawasaki, RN     07/17/2021 4693 saccharomyces boulardii (FLORASTOR) capsule 250 mg 250 mg Oral Given Sd Robb RN     07/16/2021 1712 saccharomyces boulardii (FLORASTOR) capsule 250 mg 250 mg Oral Given Edward Calvo RN     07/17/2021 0622 methylPREDNISolone sodium succinate (Solu-MEDROL) injection 20 mg 20 mg Intravenous Given Karmen Kawasaki, RN     07/16/2021 9828 methylPREDNISolone sodium succinate (Solu-MEDROL) injection 20 mg 20 mg Intravenous Given Laurel Mishra RN     07/16/2021 1411 methylPREDNISolone sodium succinate (Solu-MEDROL) injection 20 mg 20 mg Intravenous Given Virgil Severe, RN     07/16/2021 2357 lactated ringers infusion 75 mL/hr Intravenous [address] Laurel Mishra RN     07/16/2021 2356 lactated ringers infusion 0 mL/hr Intravenous Stopped Laurel Mishra RN     07/16/2021 1321 lactated ringers infusion 75 mL/hr Intravenous New Bag Virgil Severe, RN          Objective:     Vitals: Blood pressure 122/65, pulse 91, temperature 98 °F (36 7 °C), resp  rate 18, height 6' 3" (1 905 m), weight 96 4 kg (212 lb 8 4 oz), SpO2 97 %  ,Body mass index is 26 56 kg/m²  Intake/Output Summary (Last 24 hours) at 7/17/2021 1256  Last data filed at 7/16/2021 2356  Gross per 24 hour   Intake 1153 75 ml   Output 200 ml   Net 953 75 ml       Physical Exam:   General Appearance:   Alert, cooperative, no distress   HEENT:   Normocephalic, atraumatic, anicteric  Neck:  Supple, symmetrical, trachea midline   Lungs:   Clear to auscultation bilaterally; no rales, rhonchi or wheezing; respirations unlabored    Heart[de-identified]   Regular rate and rhythm; no murmur, rub, or gallop     Abdomen:   Soft, non-tender, non-distended; normal bowel sounds; no masses, no organomegaly    Genitalia:   Deferred    Rectal:   Deferred    Extremities:  No cyanosis, clubbing or edema    Pulses:  2+ and symmetric all extremities    Skin:  No jaundice, rashes, or lesions    Lymph nodes:  No palpable cervical lymphadenopathy          Invasive Devices     Peripheral Intravenous Line            Peripheral IV 07/13/21 Left;Proximal;Ventral (anterior) Forearm 3 days    Peripheral IV 07/17/21 Left Antecubital <1 day                Lab Results:  Admission on 07/15/2021   Component Date Value    WBC 07/16/2021 6 94     RBC 07/16/2021 3 05*    Hemoglobin 07/16/2021 7 5*    Hematocrit 07/16/2021 25 5*  MCV 07/16/2021 84     MCH 07/16/2021 24 6*    MCHC 07/16/2021 29 4*    RDW 07/16/2021 18 7*    Platelets 86/27/8079 256     MPV 07/16/2021 11 2     Sodium 07/16/2021 137     Potassium 07/16/2021 4 3     Chloride 07/16/2021 103     CO2 07/16/2021 28     ANION GAP 07/16/2021 6     BUN 07/16/2021 14     Creatinine 07/16/2021 0 49*    Glucose 07/16/2021 107     Calcium 07/16/2021 8 9     eGFR 07/16/2021 156     WBC 07/17/2021 6 63     RBC 07/17/2021 2 84*    Hemoglobin 07/17/2021 7 0*    Hematocrit 07/17/2021 23 6*    MCV 07/17/2021 83     MCH 07/17/2021 24 6*    MCHC 07/17/2021 29 7*    RDW 07/17/2021 18 9*    MPV 07/17/2021 11 1     Platelets 70/26/1349 261     nRBC 07/17/2021 1     Neutrophils Relative 07/17/2021 63     Immat GRANS % 07/17/2021 2     Lymphocytes Relative 07/17/2021 22     Monocytes Relative 07/17/2021 12     Eosinophils Relative 07/17/2021 1     Basophils Relative 07/17/2021 0     Neutrophils Absolute 07/17/2021 4 22     Immature Grans Absolute 07/17/2021 0 10     Lymphocytes Absolute 07/17/2021 1 47     Monocytes Absolute 07/17/2021 0 77     Eosinophils Absolute 07/17/2021 0 05     Basophils Absolute 07/17/2021 0 02     Sodium 07/17/2021 137     Potassium 07/17/2021 4 3     Chloride 07/17/2021 104     CO2 07/17/2021 27     ANION GAP 07/17/2021 6     BUN 07/17/2021 15     Creatinine 07/17/2021 0 51*    Glucose 07/17/2021 98     Calcium 07/17/2021 8 7     Corrected Calcium 07/17/2021 10 4*    AST 07/17/2021 22     ALT 07/17/2021 124*    Alkaline Phosphatase 07/17/2021 71     Total Protein 07/17/2021 6 2*    Albumin 07/17/2021 1 9*    Total Bilirubin 07/17/2021 0 41     eGFR 07/17/2021 154        Imaging Studies: I have personally reviewed pertinent imaging studies

## 2021-07-17 NOTE — ASSESSMENT & PLAN NOTE
· Acute blood loss anemia in the setting of ulcer colitis flare as well as iron deficiency anemia  · Hbg 7 0 today, will continue to monitor and transfuse as needed

## 2021-07-17 NOTE — ASSESSMENT & PLAN NOTE
· Patient was recently diagnosed with ulcerative colitis, was being treated with mesalamine and Remicade as outpatient  Received 2 doses without much improvement  Presented with syncopal episode  · CT abdomen pelvis showed worsening colitis  · GI following, on intravenous steroids PPI and IVF  · Colorectal surgery following, no plans for surgical intervention at this time  · Plan to continue IV steroids through weekend and re-evaluate on Monday  · Frequency of bowel movements improving, abdominal pain improving  Appetite good

## 2021-07-17 NOTE — PROGRESS NOTES
[address] [facility]  Progress Note - Skinny  2000, 24 y o  male MRN: [id number]  Unit/Bed#: [facility] 816-01 Encounter: [id number]  Primary Care Provider: Alexnadria Mejia DO   Date and time admitted to hospital: 7/15/2021  9:24 PM    * Ulcerative colitis [facility])  Assessment & Plan  · Patient was recently diagnosed with ulcerative colitis, was being treated with mesalamine and Remicade as outpatient  Received 2 doses without much improvement  Presented with syncopal episode  · CT abdomen pelvis showed worsening colitis  · GI following, on intravenous steroids PPI and IVF  · Colorectal surgery following, no plans for surgical intervention at this time  · Plan to continue IV steroids through weekend and re-evaluate on Monday  · Frequency of bowel movements improving, abdominal pain improving  Appetite good  ABLA (acute blood loss anemia)  Assessment & Plan  · Acute blood loss anemia in the setting of ulcer colitis flare as well as iron deficiency anemia  · Hbg 7 0 today, will continue to monitor and transfuse as needed     Transaminitis  Assessment & Plan  · Elevated ALT, possibly due to blood loss  · MRI/ MRCP showed normal MRCP with heavy metal deposition in the liver, consistent with hemochromatosis which is likely 2/2 recent iron/blood transfusions  Iron panel not consistent with overload  · Hepatitis B and C, mitochondrial and smooth antibodies negative  · hemochromatosis mutation pending    Iron deficiency anemia  Assessment & Plan  · Continue iron supplementation        VTE Pharmacologic Prophylaxis: VTE Score: 0 Moderate Risk (Score 3-4) - Pharmacological DVT Prophylaxis Contraindicated  Sequential Compression Devices Ordered  Patient Centered Rounds: I performed bedside rounds with nursing staff today    Discussions with Specialists or Other Care Team Provider: nursing, gi fellow     Education and Discussions with Family / Patient: Updated  (mother) at bedside  Time Spent for Care: 30 minutes  More than 50% of total time spent on counseling and coordination of care as described above  Current Length of Stay: 2 day(s)  Current Patient Status: Inpatient   Certification Statement: The patient will continue to require additional inpatient hospital stay due to Intravenous steroids  Discharge Plan: Anticipate discharge in 48-72 hrs to home  Code Status: Level 1 - Full Code    Subjective:   Patient with no new complaints  Frequency of bowel movements seems to be improving, has not gone yet today  Left lower quadrant pain/tenderness slightly improved  Appetite remains quite good  Objective:     Vitals:   Temp (24hrs), Av 3 °F (36 8 °C), Min:98 °F (36 7 °C), Max:98 6 °F (37 °C)    Temp:  [98 °F (36 7 °C)-98 6 °F (37 °C)] 98 °F (36 7 °C)  HR:  [] 91  Resp:  [16-18] 18  BP: (116-125)/(64-65) 122/65  SpO2:  [97 %-98 %] 97 %  Body mass index is 26 56 kg/m²  Input and Output Summary (last 24 hours): Intake/Output Summary (Last 24 hours) at 2021 1032  Last data filed at 2021 2356  Gross per 24 hour   Intake 1153 75 ml   Output 600 ml   Net 553 75 ml       Physical Exam:   Physical Exam  Vitals and nursing note reviewed  Cardiovascular:      Rate and Rhythm: Tachycardia present  Comments: HR low 100's  Pulmonary:      Breath sounds: Normal breath sounds  Abdominal:      Tenderness: There is abdominal tenderness (LLQ, slightly improved today)  Musculoskeletal:         General: No swelling  Skin:     General: Skin is warm  Coloration: Skin is pale  Neurological:      Mental Status: He is alert and oriented to person, place, and time  Mental status is at baseline     Psychiatric:         Mood and Affect: Mood normal           Additional Data:     Labs:  Results from last 7 days   Lab Units 21  0540 21  2020 21  0508   WBC Thousand/uL 6 63   < > 6 36   HEMOGLOBIN g/dL 7 0*  --  7 7* HEMATOCRIT % 23 6*  --  25 0*   PLATELETS Thousands/uL 261   < > 229   BANDS PCT %  --   --  17*   NEUTROS PCT % 63  --   --    LYMPHS PCT % 22  --   --    LYMPHO PCT %  --   --  28   MONOS PCT % 12  --   --    MONO PCT %  --   --  18*   EOS PCT % 1  --  0    < > = values in this interval not displayed  Results from last 7 days   Lab Units 07/17/21  0540   SODIUM mmol/L 137   POTASSIUM mmol/L 4 3   CHLORIDE mmol/L 104   CO2 mmol/L 27   BUN mg/dL 15   CREATININE mg/dL 0 51*   ANION GAP mmol/L 6   CALCIUM mg/dL 8 7   ALBUMIN g/dL 1 9*   TOTAL BILIRUBIN mg/dL 0 41   ALK PHOS U/L 71   ALT U/L 124*   AST U/L 22   GLUCOSE RANDOM mg/dL 98                       Lines/Drains:  Invasive Devices     Peripheral Intravenous Line            Peripheral IV 07/13/21 Left;Proximal;Ventral (anterior) Forearm 3 days                      Imaging: No pertinent imaging reviewed  Recent Cultures (last 7 days):         Last 24 Hours Medication List:   Current Facility-Administered Medications   Medication Dose Route Frequency Provider Last Rate    acetaminophen  650 mg Oral Q4H PRN Laurel Vila MD      ferrous sulfate  325 mg Oral Daily With Breakfast Laurel Vila MD      lactated ringers  75 mL/hr Intravenous Continuous Zurdo MD Holli 75 mL/hr (07/16/21 6966)    methylPREDNISolone sodium succinate  20 mg Intravenous Q8H Chioma Smith MD      ondansetron  4 mg Intravenous Q4H PRN Laurel Vila MD      pantoprazole  40 mg Oral Early Morning Laurel Vila MD      saccharomyces boulardii  250 mg Oral BID Laurel Vila MD          Today, Patient Was Seen By: J CARLOS Rene    **Please Note: This note may have been constructed using a voice recognition system  **

## 2021-07-17 NOTE — ASSESSMENT & PLAN NOTE
· Elevated ALT, possibly due to blood loss  · MRI/ MRCP showed normal MRCP with heavy metal deposition in the liver, consistent with hemochromatosis which is likely 2/2 recent iron/blood transfusions  Iron panel not consistent with overload  · Hepatitis B and C, mitochondrial and smooth antibodies negative    · hemochromatosis mutation pending

## 2021-07-18 LAB
ABO GROUP BLD: NORMAL
BLD GP AB SCN SERPL QL: NEGATIVE
ERYTHROCYTE [DISTWIDTH] IN BLOOD BY AUTOMATED COUNT: 19.5 % (ref 11.6–15.1)
HCT VFR BLD AUTO: 23.2 % (ref 36.5–49.3)
HCT VFR BLD AUTO: 25.8 % (ref 36.5–49.3)
HGB BLD-MCNC: 6.9 G/DL (ref 12–17)
HGB BLD-MCNC: 7.8 G/DL (ref 12–17)
MCH RBC QN AUTO: 25.7 PG (ref 26.8–34.3)
MCHC RBC AUTO-ENTMCNC: 30.2 G/DL (ref 31.4–37.4)
MCV RBC AUTO: 85 FL (ref 82–98)
PLATELET # BLD AUTO: 274 THOUSANDS/UL (ref 149–390)
PMV BLD AUTO: 10.7 FL (ref 8.9–12.7)
RBC # BLD AUTO: 3.04 MILLION/UL (ref 3.88–5.62)
RH BLD: POSITIVE
SPECIMEN EXPIRATION DATE: NORMAL
WBC # BLD AUTO: 6.84 THOUSAND/UL (ref 4.31–10.16)

## 2021-07-18 PROCEDURE — 86850 RBC ANTIBODY SCREEN: CPT | Performed by: NURSE PRACTITIONER

## 2021-07-18 PROCEDURE — P9040 RBC LEUKOREDUCED IRRADIATED: HCPCS

## 2021-07-18 PROCEDURE — 99232 SBSQ HOSP IP/OBS MODERATE 35: CPT | Performed by: NURSE PRACTITIONER

## 2021-07-18 PROCEDURE — 30233N1 TRANSFUSION OF NONAUTOLOGOUS RED BLOOD CELLS INTO PERIPHERAL VEIN, PERCUTANEOUS APPROACH: ICD-10-PCS | Performed by: COLON & RECTAL SURGERY

## 2021-07-18 PROCEDURE — 99233 SBSQ HOSP IP/OBS HIGH 50: CPT | Performed by: COLON & RECTAL SURGERY

## 2021-07-18 PROCEDURE — 85018 HEMOGLOBIN: CPT | Performed by: NURSE PRACTITIONER

## 2021-07-18 PROCEDURE — 86900 BLOOD TYPING SEROLOGIC ABO: CPT | Performed by: NURSE PRACTITIONER

## 2021-07-18 PROCEDURE — 86920 COMPATIBILITY TEST SPIN: CPT

## 2021-07-18 PROCEDURE — 85027 COMPLETE CBC AUTOMATED: CPT | Performed by: STUDENT IN AN ORGANIZED HEALTH CARE EDUCATION/TRAINING PROGRAM

## 2021-07-18 PROCEDURE — 86901 BLOOD TYPING SEROLOGIC RH(D): CPT | Performed by: NURSE PRACTITIONER

## 2021-07-18 PROCEDURE — 85014 HEMATOCRIT: CPT | Performed by: NURSE PRACTITIONER

## 2021-07-18 RX ORDER — ACETAMINOPHEN 325 MG/1
650 TABLET ORAL ONCE
Status: COMPLETED | OUTPATIENT
Start: 2021-07-18 | End: 2021-07-18

## 2021-07-18 RX ORDER — DIPHENHYDRAMINE HCL 25 MG
25 TABLET ORAL ONCE
Status: COMPLETED | OUTPATIENT
Start: 2021-07-18 | End: 2021-07-18

## 2021-07-18 RX ADMIN — METHYLPREDNISOLONE SODIUM SUCCINATE 20 MG: 40 INJECTION, POWDER, FOR SOLUTION INTRAMUSCULAR; INTRAVENOUS at 21:28

## 2021-07-18 RX ADMIN — Medication 250 MG: at 07:52

## 2021-07-18 RX ADMIN — ACETAMINOPHEN 650 MG: 325 TABLET, FILM COATED ORAL at 11:45

## 2021-07-18 RX ADMIN — DIPHENHYDRAMINE HCL 25 MG: 25 TABLET ORAL at 11:45

## 2021-07-18 RX ADMIN — Medication 250 MG: at 17:45

## 2021-07-18 RX ADMIN — METHYLPREDNISOLONE SODIUM SUCCINATE 20 MG: 40 INJECTION, POWDER, FOR SOLUTION INTRAMUSCULAR; INTRAVENOUS at 11:46

## 2021-07-18 RX ADMIN — METHYLPREDNISOLONE SODIUM SUCCINATE 20 MG: 40 INJECTION, POWDER, FOR SOLUTION INTRAMUSCULAR; INTRAVENOUS at 01:46

## 2021-07-18 RX ADMIN — FERROUS SULFATE TAB 325 MG (65 MG ELEMENTAL FE) 325 MG: 325 (65 FE) TAB at 07:52

## 2021-07-18 NOTE — PROGRESS NOTES
[address] [facility]  Progress Note - Yasmin Roach 2000, 24 y o  male MRN: [id number]  Unit/Bed#: [facility] 816-01 Encounter: [id number]  Primary Care Provider: Deanne Adhikari DO   Date and time admitted to hospital: 7/15/2021  9:24 PM    * Ulcerative colitis [facility])  Assessment & Plan  · Patient was recently diagnosed with ulcerative colitis, was being treated with mesalamine and Remicade as outpatient  Received 2 doses without much improvement  Presented with syncopal episode  · CT abdomen pelvis showed worsening colitis  · GI following, on intravenous steroids PPI  · Colorectal surgery following, no plans for surgical intervention at this time  · Plan to continue IV steroids through weekend and re-evaluate on Monday  · Frequency of bowel movements improving, abdominal pain improving  Appetite good  ABLA (acute blood loss anemia)  Assessment & Plan  · Acute blood loss anemia in the setting of ulcer colitis flare as well as iron deficiency anemia  · Hbg 6 9 today  Will transfuse 1 unit PRBC  Transaminitis  Assessment & Plan  · Elevated ALT, possibly due to blood loss  · MRI/ MRCP showed normal MRCP with heavy metal deposition in the liver, consistent with hemochromatosis which is likely 2/2 recent iron/blood transfusions  Iron panel not consistent with overload  · Hepatitis B and C, mitochondrial and smooth antibodies negative  · hemochromatosis mutation pending    Iron deficiency anemia  Assessment & Plan  · Continue iron supplementation      VTE Pharmacologic Prophylaxis: VTE Score: 0 Moderate Risk (Score 3-4) - Pharmacological DVT Prophylaxis Contraindicated  Sequential Compression Devices Ordered  Patient Centered Rounds: I performed bedside rounds with nursing staff today  Discussions with Specialists or Other Care Team Provider: nursing    Education and Discussions with Family / Patient: Updated  (mother) at bedside      Time Spent for Care: 20 minutes  More than 50% of total time spent on counseling and coordination of care as described above  Current Length of Stay: 3 day(s)  Current Patient Status: Inpatient   Certification Statement: The patient will continue to require additional inpatient hospital stay due to Symptomatic improvement, blood transfusion today  Discharge Plan: Anticipate discharge in 48-72 hrs to home  Code Status: Level 1 - Full Code    Subjective:   Patient complains of feeling fatigued  Had 3 bloody bowel movements yesterday, feels as though frequency is improving  Still with some left lower quadrant tenderness but slightly improved  Objective:     Vitals:   Temp (24hrs), Av 4 °F (36 9 °C), Min:97 9 °F (36 6 °C), Max:99 2 °F (37 3 °C)    Temp:  [97 9 °F (36 6 °C)-99 2 °F (37 3 °C)] 97 9 °F (36 6 °C)  HR:  [100-106] 100  Resp:  [18] 18  BP: (103-121)/(59-72) 119/72  SpO2:  [95 %-96 %] 96 %  Body mass index is 26 56 kg/m²  Input and Output Summary (last 24 hours): Intake/Output Summary (Last 24 hours) at 2021 0925  Last data filed at 2021 0200  Gross per 24 hour   Intake --   Output 900 ml   Net -900 ml       Physical Exam:   Physical Exam  Vitals and nursing note reviewed  Cardiovascular:      Rate and Rhythm: Normal rate  Pulmonary:      Breath sounds: Normal breath sounds  Abdominal:      General: Bowel sounds are normal       Palpations: Abdomen is soft  Tenderness: There is abdominal tenderness (Left lower quadrant)  Musculoskeletal:         General: No swelling  Skin:     General: Skin is warm  Coloration: Skin is pale  Neurological:      Mental Status: He is alert and oriented to person, place, and time  Mental status is at baseline     Psychiatric:         Mood and Affect: Mood normal           Additional Data:     Labs:  Results from last 7 days   Lab Units 21  0610 21  0540 21  2020 21  0508   WBC Thousand/uL  --  6 63   < > 6 36   HEMOGLOBIN g/dL 6  9* 7 0*  --  7 7*   HEMATOCRIT % 23 2* 23 6*  --  25 0*   PLATELETS Thousands/uL  --  261   < > 229   BANDS PCT %  --   --   --  17*   NEUTROS PCT %  --  63  --   --    LYMPHS PCT %  --  22  --   --    LYMPHO PCT %  --   --   --  28   MONOS PCT %  --  12  --   --    MONO PCT %  --   --   --  18*   EOS PCT %  --  1  --  0    < > = values in this interval not displayed  Results from last 7 days   Lab Units 07/17/21  0540   SODIUM mmol/L 137   POTASSIUM mmol/L 4 3   CHLORIDE mmol/L 104   CO2 mmol/L 27   BUN mg/dL 15   CREATININE mg/dL 0 51*   ANION GAP mmol/L 6   CALCIUM mg/dL 8 7   ALBUMIN g/dL 1 9*   TOTAL BILIRUBIN mg/dL 0 41   ALK PHOS U/L 71   ALT U/L 124*   AST U/L 22   GLUCOSE RANDOM mg/dL 98                       Lines/Drains:  Invasive Devices     Peripheral Intravenous Line            Peripheral IV 07/17/21 Left Antecubital <1 day    Peripheral IV 07/18/21 Dorsal (posterior); Left Forearm <1 day                      Imaging: No pertinent imaging reviewed  Recent Cultures (last 7 days):         Last 24 Hours Medication List:   Current Facility-Administered Medications   Medication Dose Route Frequency Provider Last Rate    acetaminophen  650 mg Oral Q4H PRN Boris Wynn MD      ferrous sulfate  325 mg Oral Daily With Breakfast Boris Wynn MD      methylPREDNISolone sodium succinate  20 mg Intravenous [facility] Boris Wynn MD      ondansetron  4 mg Intravenous Q4H PRN Boris Wynn MD      pantoprazole  40 mg Oral Early Morning Boris Wynn MD      saccharomyces boulardii  250 mg Oral BID Boris Wynn MD          Today, Patient Was Seen By: J CARLOS Duggan    **Please Note: This note may have been constructed using a voice recognition system  **

## 2021-07-18 NOTE — ASSESSMENT & PLAN NOTE
· Acute blood loss anemia in the setting of ulcer colitis flare as well as iron deficiency anemia  · Hbg 6 9 today  Will transfuse 1 unit PRBC

## 2021-07-18 NOTE — PROGRESS NOTES
Progress Note - General Surgery   Dashawn Warren 24 y o  male MRN: [id number]  Unit/Bed#: [facility] 816-01 Encounter: [id number]    Assessment:  24 M w/ Ulcerative colitits and no significant improvement on IV steroids and Remicade      Plan:  - Diet GI; Lo Fiber/Lo Residue and Ensures  - Pain and Nausea control PRN  - OOB, ambulate  - Continue management per GI with steroid and Remicade  - No surgical intervention at this time  - Monitor Hgb    Subjective/Objective     Subjective:   Pain is controlled, +ve 3 bloody bowel movement    Objective:    Blood pressure 119/72, pulse 100, temperature 97 9 °F (36 6 °C), resp  rate 18, height 6' 3" (1 905 m), weight 96 4 kg (212 lb 8 4 oz), SpO2 96 %  ,Body mass index is 26 56 kg/m²  Intake/Output Summary (Last 24 hours) at 7/18/2021 0750  Last data filed at 7/18/2021 0200  Gross per 24 hour   Intake --   Output 900 ml   Net -900 ml       Invasive Devices     Peripheral Intravenous Line            Peripheral IV 07/17/21 Left Antecubital <1 day    Peripheral IV 07/18/21 Dorsal (posterior); Left Forearm <1 day                Physical Exam:   Gen:  NAD  CV:  warm, well-perfused  Lungs: nl effort  Abd:  soft, NT/ND  Ext:  no CCE  Neuro: A&Ox3     Results from last 7 days   Lab Units 07/17/21  0540 07/16/21  0523 07/15/21  0505   WBC Thousand/uL 6 63 6 94 7 05   HEMOGLOBIN g/dL 7 0* 7 5* 7 1*   HEMATOCRIT % 23 6* 25 5* 23 7*   PLATELETS Thousands/uL 261 256 235     Results from last 7 days   Lab Units 07/17/21  0540 07/16/21  0523 07/15/21  0505   POTASSIUM mmol/L 4 3 4 3 4 3   CHLORIDE mmol/L 104 103 102   CO2 mmol/L 27 28 27   BUN mg/dL 15 14 15   CREATININE mg/dL 0 51* 0 49* 0 69   CALCIUM mg/dL 8 7 8 9 8 5

## 2021-07-18 NOTE — ASSESSMENT & PLAN NOTE
· Patient was recently diagnosed with ulcerative colitis, was being treated with mesalamine and Remicade as outpatient  Received 2 doses without much improvement  Presented with syncopal episode  · CT abdomen pelvis showed worsening colitis  · GI following, on intravenous steroids PPI  · Colorectal surgery following, no plans for surgical intervention at this time  · Plan to continue IV steroids through weekend and re-evaluate on Monday  · Frequency of bowel movements improving, abdominal pain improving  Appetite good

## 2021-07-19 LAB
ABO GROUP BLD BPU: NORMAL
ABO GROUP BLD BPU: NORMAL
BPU ID: NORMAL
BPU ID: NORMAL
CROSSMATCH: NORMAL
CROSSMATCH: NORMAL
HCT VFR BLD AUTO: 25 % (ref 36.5–49.3)
HGB BLD-MCNC: 7.5 G/DL (ref 12–17)
UNIT DISPENSE STATUS: NORMAL
UNIT DISPENSE STATUS: NORMAL
UNIT PRODUCT CODE: NORMAL
UNIT PRODUCT CODE: NORMAL
UNIT RH: NORMAL
UNIT RH: NORMAL

## 2021-07-19 PROCEDURE — 85014 HEMATOCRIT: CPT | Performed by: NURSE PRACTITIONER

## 2021-07-19 PROCEDURE — 99232 SBSQ HOSP IP/OBS MODERATE 35: CPT | Performed by: INTERNAL MEDICINE

## 2021-07-19 PROCEDURE — 85018 HEMOGLOBIN: CPT | Performed by: NURSE PRACTITIONER

## 2021-07-19 PROCEDURE — 99232 SBSQ HOSP IP/OBS MODERATE 35: CPT | Performed by: NURSE PRACTITIONER

## 2021-07-19 RX ADMIN — FERROUS SULFATE TAB 325 MG (65 MG ELEMENTAL FE) 325 MG: 325 (65 FE) TAB at 09:48

## 2021-07-19 RX ADMIN — Medication 250 MG: at 17:31

## 2021-07-19 RX ADMIN — METHYLPREDNISOLONE SODIUM SUCCINATE 20 MG: 40 INJECTION, POWDER, FOR SOLUTION INTRAMUSCULAR; INTRAVENOUS at 21:56

## 2021-07-19 RX ADMIN — METHYLPREDNISOLONE SODIUM SUCCINATE 20 MG: 40 INJECTION, POWDER, FOR SOLUTION INTRAMUSCULAR; INTRAVENOUS at 14:12

## 2021-07-19 RX ADMIN — Medication 250 MG: at 09:48

## 2021-07-19 RX ADMIN — METHYLPREDNISOLONE SODIUM SUCCINATE 20 MG: 40 INJECTION, POWDER, FOR SOLUTION INTRAMUSCULAR; INTRAVENOUS at 06:25

## 2021-07-19 RX ADMIN — PANTOPRAZOLE SODIUM 40 MG: 40 TABLET, DELAYED RELEASE ORAL at 06:25

## 2021-07-19 NOTE — PROGRESS NOTES
Progress Note- Dylan Miller 24 y o  male MRN: [id number]    Unit/Bed#: [facility] 816-01 Encounter: [id number]      Assessment and Plan:    Ulcerative colitis flare  · Continue IV methylprednisolone and IV pantoprazole  · Continue to follow clinically - will transition to PO steroids when he is more stable and without bloody bowel movements    Elevated ALT  · Continuing to decrease  · Likely due to blood loss  · Continue iron supplementation  · Monitor liver enzymes    Iron deficiency anemia  · Continue iron supplementation  · Monitor Hg and transfuse < 7    ______________________________________________________________________    Subjective:     Still has diffuse abdominal pain with movement and up to 4 red bowel movements a day  Intermittent hot flashes and diaphoresis  Still experiencing dizziness with prolonged standing  Eating and drinking well  No nausea or vomiting  Received 1 unit of PRBC yesterday  ALT continuing to decrease from 147 to 124 today        Medication Administration - last 24 hours from 07/18/2021 1031 to 07/19/2021 1031       Date/Time Order Dose Route Action Action by     07/19/2021 0948 ferrous sulfate tablet 325 mg 325 mg Oral Given Shakeel Ayala RN     07/19/2021 0625 pantoprazole (PROTONIX) EC tablet 40 mg 40 mg Oral Given Brigida Cotto RN     07/19/2021 2013 saccharomyces boulardii (FLORASTOR) capsule 250 mg 250 mg Oral Given Shakeel Ayala RN     07/18/2021 1745 saccharomyces boulardii (FLORASTOR) capsule 250 mg 250 mg Oral Given Shakeel Ayala RN     07/19/2021 1865 methylPREDNISolone sodium succinate (Solu-MEDROL) injection 20 mg 20 mg Intravenous Given Brigida Cotto RN     07/18/2021 2128 methylPREDNISolone sodium succinate (Solu-MEDROL) injection 20 mg 20 mg Intravenous Given Brigida Cotto RN     07/18/2021 1146 methylPREDNISolone sodium succinate (Solu-MEDROL) injection 20 mg 20 mg Intravenous Given Shakeel Ayala RN     07/18/2021 1145 diphenhydrAMINE (BENADRYL) tablet 25 mg 25 mg Oral Given David Young RN     07/18/2021 1145 acetaminophen (TYLENOL) tablet 650 mg 650 mg Oral Given David Young RN          Objective:     Vitals: Blood pressure 111/66, pulse 93, temperature 98 1 °F (36 7 °C), resp  rate 16, height 6' 3" (1 905 m), weight 96 4 kg (212 lb 8 4 oz), SpO2 97 %  ,Body mass index is 26 56 kg/m²  Intake/Output Summary (Last 24 hours) at 7/19/2021 1031  Last data filed at 7/18/2021 2120  Gross per 24 hour   Intake --   Output 300 ml   Net -300 ml       Physical Exam:   General Appearance: Awake and alert, in no acute distress  Abdomen: Tender in upper quadrants and LLQ, worse in LLQ, soft, non-tender, non-distended; bowel sounds normal; no masses or no organomegaly    Invasive Devices     Peripheral Intravenous Line            Peripheral IV 07/18/21 Dorsal (posterior); Left Forearm 1 day                Lab Results:  Admission on 07/15/2021   Component Date Value    WBC 07/16/2021 6 94     RBC 07/16/2021 3 05*    Hemoglobin 07/16/2021 7 5*    Hematocrit 07/16/2021 25 5*    MCV 07/16/2021 84     MCH 07/16/2021 24 6*    MCHC 07/16/2021 29 4*    RDW 07/16/2021 18 7*    Platelets 80/55/2527 256     MPV 07/16/2021 11 2     Sodium 07/16/2021 137     Potassium 07/16/2021 4 3     Chloride 07/16/2021 103     CO2 07/16/2021 28     ANION GAP 07/16/2021 6     BUN 07/16/2021 14     Creatinine 07/16/2021 0 49*    Glucose 07/16/2021 107     Calcium 07/16/2021 8 9     eGFR 07/16/2021 156     WBC 07/17/2021 6 63     RBC 07/17/2021 2 84*    Hemoglobin 07/17/2021 7 0*    Hematocrit 07/17/2021 23 6*    MCV 07/17/2021 83     MCH 07/17/2021 24 6*    MCHC 07/17/2021 29 7*    RDW 07/17/2021 18 9*    MPV 07/17/2021 11 1     Platelets 55/34/8851 261     nRBC 07/17/2021 1     Neutrophils Relative 07/17/2021 63     Immat GRANS % 07/17/2021 2     Lymphocytes Relative 07/17/2021 22     Monocytes Relative 07/17/2021 12     Eosinophils Relative 07/17/2021 1     Basophils Relative 07/17/2021 0     Neutrophils Absolute 07/17/2021 4 22     Immature Grans Absolute 07/17/2021 0 10     Lymphocytes Absolute 07/17/2021 1 47     Monocytes Absolute 07/17/2021 0 77     Eosinophils Absolute 07/17/2021 0 05     Basophils Absolute 07/17/2021 0 02     Sodium 07/17/2021 137     Potassium 07/17/2021 4 3     Chloride 07/17/2021 104     CO2 07/17/2021 27     ANION GAP 07/17/2021 6     BUN 07/17/2021 15     Creatinine 07/17/2021 0 51*    Glucose 07/17/2021 98     Calcium 07/17/2021 8 7     Corrected Calcium 07/17/2021 10 4*    AST 07/17/2021 22     ALT 07/17/2021 124*    Alkaline Phosphatase 07/17/2021 71     Total Protein 07/17/2021 6 2*    Albumin 07/17/2021 1 9*    Total Bilirubin 07/17/2021 0 41     eGFR 07/17/2021 154     Hemoglobin 07/18/2021 6 9*    Hematocrit 07/18/2021 23 2*    ABO Grouping 07/18/2021 O     Rh Factor 07/18/2021 Positive     Antibody Screen 07/18/2021 Negative     Specimen Expiration Date 07/18/2021 [id number]     Unit Product Code 07/19/2021 [id number]     Unit Number 07/19/2021 [id number]     Unit ABO 07/19/2021 O     Unit RH 07/19/2021 NEG     Crossmatch 07/19/2021 Compatible     Unit Dispense Status 07/19/2021 Return to [facility]     Unit Product Code 07/19/2021 [id number]     Unit Number 07/19/2021 [id number]     Unit ABO 07/19/2021 O     Unit RH 07/19/2021 NEG     Crossmatch 07/19/2021 Compatible     Unit Dispense Status 07/19/2021 Presumed Trans     WBC 07/18/2021 6 84     RBC 07/18/2021 3 04*    Hemoglobin 07/18/2021 7 8*    Hematocrit 07/18/2021 25 8*    MCV 07/18/2021 85     MCH 07/18/2021 25 7*    MCHC 07/18/2021 30 2*    RDW 07/18/2021 19 5*    Platelets 85/79/6415 274     MPV 07/18/2021 10 7     Hemoglobin 07/19/2021 7 5*    Hematocrit 07/19/2021 25 0*       Imaging Studies: I have personally reviewed pertinent imaging studies

## 2021-07-19 NOTE — ASSESSMENT & PLAN NOTE
· Patient was recently diagnosed with ulcerative colitis, was being treated with mesalamine and Remicade as outpatient  Received 2 doses without much improvement  Presented with syncopal episode  · CT abdomen pelvis showed worsening colitis  · GI following, on intravenous steroids and PPI  · Colorectal surgery following, no plans for surgical intervention at this time  · Frequency of bowel movements improving, abdominal pain improving  Appetite good      · Follows with Dr Toby Booker as outpatient

## 2021-07-19 NOTE — UTILIZATION REVIEW
Continued Stay Review    Date: 7/19/21                       Current Patient Class: IP  Current Level of Care: MS    HPI:21 y o  male initially admitted on 7/15 with ulcerative colitis flare, treated with remicade  Assessment/Plan:   Pt has had no significant improvement on IV steroids and Remicade  Continues to have pain and nausea, 3 liquid bloody BMs yesterday, low appetite, N/V  Not ambulating d/t lightheadedness  IV fluids were d/c 7/18  Continues on oral Protonix, Florastor, IV steroids  Pt mentioned he thinks stools were more formed  No surgical intervention planned at this time  Continue treatment plan       Vital Signs:   07/19/21 07:30:55  98 1 °F (36 7 °C)  93  16  111/66  81  97 %  --   07/18/21 2252  99 1 °F (37 3 °C)  113Abnormal   18  104/56  72  96 %  --   07/18/21 14:58:51  98 6 °F (37 °C)  90  16  119/63  82  98 %  --   07/18/21 14:01:52  98 6 °F (37 °C)  88  20  110/68  --  97 %  --   07/18/21 10:33:18  98 °F (36 7 °C)  92  --  111/59  76  98 %  --   07/18/21 1018  --  --  18  --  --  --  --   07/18/21 10:17:45  98 °F (36 7 °C)  88  --  107/58  74  97 %  --   07/18/21 0956  --  --  --  --  --  97 %  None (Room air)   07/18/21 0747  97 9 °F (36 6 °C)  100  18  119/72  88  96 %  --   07/17/21 22:31:23  98 °F (36 7 °C)  103  18  121/70  87  95 %  --   07/17/21 2040  --  --  --  --  --  --  None (Room air)   07/17/21 15:36:42  99 2 °F (37 3 °C)  106Abnormal   --  103/59  74  96 %  --   07/17/21 08:43:49  98 °F (36 7 °C)  91  18  122/65  84  97 %  --     Pertinent Labs/Diagnostic Results:       Results from last 7 days   Lab Units 07/19/21  0721 07/18/21  1515 07/18/21  0610 07/17/21  0540 07/16/21  0523 07/13/21  2020 07/13/21  0508   WBC Thousand/uL  --  6 84  --  6 63 6 94   < > 6 36   HEMOGLOBIN g/dL 7 5* 7 8* 6 9* 7 0* 7 5*  --  7 7*   HEMATOCRIT % 25 0* 25 8* 23 2* 23 6* 25 5*  --  25 0*   PLATELETS Thousands/uL  --  274  --  261 256   < > 229   NEUTROS ABS Thousands/µL  --   --   -- 4 22  --   --   --    BANDS PCT %  --   --   --   --   --   --  17*    < > = values in this interval not displayed  Results from last 7 days   Lab Units 07/17/21  0540 07/16/21  0523 07/15/21  0505 07/14/21  0455 07/13/21  0508   SODIUM mmol/L 137 137 137 138 136   POTASSIUM mmol/L 4 3 4 3 4 3 4 1 3 8   CHLORIDE mmol/L 104 103 102 103 103   CO2 mmol/L 27 28 27 28 26   ANION GAP mmol/L 6 6 8 7 7   BUN mg/dL 15 14 15 13 12   CREATININE mg/dL 0 51* 0 49* 0 69 0 79 0 62   EGFR ml/min/1 73sq m 154 156 136 128 142   CALCIUM mg/dL 8 7 8 9 8 5 8 7 8 3     Results from last 7 days   Lab Units 07/17/21  0540 07/15/21  0505 07/14/21  0455 07/13/21  0508   AST U/L 22 32 36 54*   ALT U/L 124* 147* 208* 212*   ALK PHOS U/L 71 65 70 64   TOTAL PROTEIN g/dL 6 2* 5 6* 6 3* 5 6*   ALBUMIN g/dL 1 9* 1 9* 2 1* 2 0*   TOTAL BILIRUBIN mg/dL 0 41 0 33 0 33 0 24         Results from last 7 days   Lab Units 07/17/21  0540 07/16/21  0523 07/15/21  0505 07/14/21  0455 07/13/21  0508   GLUCOSE RANDOM mg/dL 98 107 111 107 113     Results from last 7 days   Lab Units 07/14/21  0454   HEP C AB  Non-reactive         Results from last 7 days   Lab Units 07/15/21  0505 07/14/21  0455 07/13/21  0508   CRP mg/L 74 4* 64 6* 56 6*     Medications:   Scheduled Medications:  ferrous sulfate, 325 mg, Oral, Daily With Breakfast  methylPREDNISolone sodium succinate, 20 mg, Intravenous, Q8H CALE  pantoprazole, 40 mg, Oral, Early Morning  saccharomyces boulardii, 250 mg, Oral, BID      Continuous IV Infusions:     PRN Meds:  acetaminophen, 650 mg, Oral, Q4H PRN  ondansetron, 4 mg, Intravenous, Q4H PRN    Discharge Plan: TBD    Network Utilization Review Department  ATTENTION: Please call with any questions or concerns to [phone] and carefully listen to the prompts so that you are directed to the right person   All voicemails are confidential   Jasmyn Espinoza all requests for admission clinical reviews, approved or denied determinations and any other requests to dedicated fax number below belonging to the campus where the patient is receiving treatment   List of dedicated fax numbers for the Facilities:  [address] [place] [address] DENIALS (Administrative/Medical Necessity) [phone]   [address]  (Maternity/NICU/Pediatrics) [phone]   [address] [address] [address] [facility] [place] [address] [phone]

## 2021-07-19 NOTE — PROGRESS NOTES
Progress Note - Colorectal Surgery   Cosmo Dillon 24 y o  male MRN: [id number]  Unit/Bed#: [facility] 816-01 Encounter: [id number]    Assessment:  24 M w/ Ulcerative colitits and no significant improvement on IV steroids and Remicade  Plan:  - Diet GI; Lo Fiber/Lo Residue and Ensures  - Pain and Nausea control PRN  - OOB, ambulate  - Continue management per GI with steroid and Remicade  - No surgical intervention at this time  - Monitor Hgb    Subjective/Objective     Subjective:   No acute events overnight  + BM x 3 yesterday  Liquid and bloody  Low appetite  - N  - V  Patient not been ambulating because lightheaded  Objective:     Blood pressure 104/56, pulse (!) 113, temperature 99 1 °F (37 3 °C), resp  rate 18, height 6' 3" (1 905 m), weight 96 4 kg (212 lb 8 4 oz), SpO2 96 %  ,Body mass index is 26 56 kg/m²  Intake/Output Summary (Last 24 hours) at 7/19/2021 0600  Last data filed at 7/18/2021 2120  Gross per 24 hour   Intake 2252 5 ml   Output 300 ml   Net 1952 5 ml       Invasive Devices     Peripheral Intravenous Line            Peripheral IV 07/18/21 Dorsal (posterior); Left Forearm 1 day                Physical Exam:   Gen: NAD, Comfortable  Neuro: A&O, No focal deficits  Head: Normal Cephalic, Atraumatic  Eye: EOMI, PERRLA, No scleral icterus  Neck: Supple, No JVD, Midline trachea  CV: RRR, Cap refill <2 sec  Pulm: Normal work of breathing, no respiratory distress  Abd: Soft, Non-Distended, mild Tender  Ext: No edema, Non-tender  Skin: warm, dry, intact

## 2021-07-19 NOTE — ASSESSMENT & PLAN NOTE
· Acute blood loss anemia in the setting of ulcer colitis flare as well as iron deficiency anemia  · 1 unit give 7/18, hemoglobin 7 5 today     · Trend H&H

## 2021-07-19 NOTE — PROGRESS NOTES
[address] [facility]  Progress Note - Candance Peers 2000, 24 y o  male MRN: [id number]  Unit/Bed#: [facility] 816-01 Encounter: [id number]  Primary Care Provider: Carola Fernandez DO   Date and time admitted to hospital: 7/15/2021  9:24 PM    * Ulcerative colitis [facility])  Assessment & Plan  · Patient was recently diagnosed with ulcerative colitis, was being treated with mesalamine and Remicade as outpatient  Received 2 doses without much improvement  Presented with syncopal episode  · CT abdomen pelvis showed worsening colitis  · GI following, on intravenous steroids and PPI  · Colorectal surgery following, no plans for surgical intervention at this time  · Frequency of bowel movements improving, abdominal pain improving  Appetite good  · Follows with Dr Tripp Conroy as outpatient    ABLA (acute blood loss anemia)  Assessment & Plan  · Acute blood loss anemia in the setting of ulcer colitis flare as well as iron deficiency anemia  · 1 unit give 7/18, hemoglobin 7 5 today  · Trend H&H    Transaminitis  Assessment & Plan  · Elevated ALT, possibly due to blood loss  · MRI/ MRCP showed normal MRCP with heavy metal deposition in the liver, consistent with hemochromatosis which is likely 2/2 recent iron/blood transfusions  Iron panel not consistent with overload  · Hepatitis B and C, mitochondrial and smooth antibodies negative  · hemochromatosis mutation pending    Iron deficiency anemia  Assessment & Plan  · Continue iron supplementation      VTE Pharmacologic Prophylaxis: VTE Score: 0 Moderate Risk (Score 3-4) - Pharmacological DVT Prophylaxis Contraindicated  Sequential Compression Devices Ordered  Patient Centered Rounds: I performed bedside rounds with nursing staff today  Discussions with Specialists or Other Care Team Provider: nursing, case management    Education and Discussions with Family / Patient: Updated  (mother) at bedside      Time Spent for Care: 20 minutes  More than 50% of total time spent on counseling and coordination of care as described above  Current Length of Stay: 4 day(s)  Current Patient Status: Inpatient   Certification Statement: The patient will continue to require additional inpatient hospital stay due to IV steroids, monitoring of hemoglobin  Discharge Plan: Anticipate discharge in 48-72 hrs to home  Code Status: Level 1 - Full Code    Subjective:   Patient offers no new complaints  Having about 3 bowel movements per day which are still bloody but feels as though they are more formed  Left lower quadrant tenderness improved, now described as more of a pressure sensation  Objective:     Vitals:   Temp (24hrs), Av 4 °F (36 9 °C), Min:98 °F (36 7 °C), Max:99 1 °F (37 3 °C)    Temp:  [98 °F (36 7 °C)-99 1 °F (37 3 °C)] 98 1 °F (36 7 °C)  HR:  [] 93  Resp:  [16-20] 16  BP: (104-119)/(56-68) 111/66  SpO2:  [96 %-98 %] 97 %  Body mass index is 26 56 kg/m²  Input and Output Summary (last 24 hours): Intake/Output Summary (Last 24 hours) at 2021 0918  Last data filed at 2021 2120  Gross per 24 hour   Intake 2252 5 ml   Output 300 ml   Net 1952 5 ml       Physical Exam:   Physical Exam  Vitals and nursing note reviewed  Constitutional:       General: He is not in acute distress  Cardiovascular:      Rate and Rhythm: Normal rate  Pulmonary:      Breath sounds: Normal breath sounds  Abdominal:      Palpations: Abdomen is soft  Tenderness: There is abdominal tenderness (Left lower quadrant)  Musculoskeletal:         General: No swelling  Skin:     General: Skin is warm  Coloration: Skin is pale  Neurological:      Mental Status: He is alert and oriented to person, place, and time  Mental status is at baseline     Psychiatric:         Mood and Affect: Mood normal           Additional Data:     Labs:  Results from last 7 days   Lab Units 21  0721 21  1515 21  0540 21  2020 07/13/21  0508   WBC Thousand/uL  --  6 84 6 63   < > 6 36   HEMOGLOBIN g/dL 7 5* 7 8* 7 0*  --  7 7*   HEMATOCRIT % 25 0* 25 8* 23 6*  --  25 0*   PLATELETS Thousands/uL  --  274 261   < > 229   BANDS PCT %  --   --   --   --  17*   NEUTROS PCT %  --   --  63  --   --    LYMPHS PCT %  --   --  22  --   --    LYMPHO PCT %  --   --   --   --  28   MONOS PCT %  --   --  12  --   --    MONO PCT %  --   --   --   --  18*   EOS PCT %  --   --  1  --  0    < > = values in this interval not displayed  Results from last 7 days   Lab Units 07/17/21  0540   SODIUM mmol/L 137   POTASSIUM mmol/L 4 3   CHLORIDE mmol/L 104   CO2 mmol/L 27   BUN mg/dL 15   CREATININE mg/dL 0 51*   ANION GAP mmol/L 6   CALCIUM mg/dL 8 7   ALBUMIN g/dL 1 9*   TOTAL BILIRUBIN mg/dL 0 41   ALK PHOS U/L 71   ALT U/L 124*   AST U/L 22   GLUCOSE RANDOM mg/dL 98                       Lines/Drains:  Invasive Devices     Peripheral Intravenous Line            Peripheral IV 07/18/21 Dorsal (posterior); Left Forearm 1 day                      Imaging: No pertinent imaging reviewed  Recent Cultures (last 7 days):         Last 24 Hours Medication List:   Current Facility-Administered Medications   Medication Dose Route Frequency Provider Last Rate    acetaminophen  650 mg Oral Q4H PRN David Thorpe MD      ferrous sulfate  325 mg Oral Daily With Breakfast David Thorpe MD      methylPREDNISolone sodium succinate  20 mg Intravenous [facility] David Thorpe MD      ondansetron  4 mg Intravenous Q4H PRN David Thorpe MD      pantoprazole  40 mg Oral Early Morning David Thorpe MD      saccharomyces boulardii  250 mg Oral BID David Thorpe MD          Today, Patient Was Seen By: J CARLOS Glover    **Please Note: This note may have been constructed using a voice recognition system  **

## 2021-07-20 ENCOUNTER — ANESTHESIA (INPATIENT)
Dept: GASTROENTEROLOGY | Facility: HOSPITAL | Age: 21
DRG: 230 | End: 2021-07-20
Payer: COMMERCIAL

## 2021-07-20 ENCOUNTER — APPOINTMENT (INPATIENT)
Dept: GASTROENTEROLOGY | Facility: HOSPITAL | Age: 21
DRG: 230 | End: 2021-07-20
Payer: COMMERCIAL

## 2021-07-20 ENCOUNTER — ANESTHESIA EVENT (INPATIENT)
Dept: GASTROENTEROLOGY | Facility: HOSPITAL | Age: 21
DRG: 230 | End: 2021-07-20
Payer: COMMERCIAL

## 2021-07-20 LAB
CRP SERPL QL: 75.8 MG/L
HCT VFR BLD AUTO: 24.9 % (ref 36.5–49.3)
HFE GENE MUT ANL BLD/T: NORMAL
HGB BLD-MCNC: 7.4 G/DL (ref 12–17)

## 2021-07-20 PROCEDURE — 86140 C-REACTIVE PROTEIN: CPT | Performed by: INTERNAL MEDICINE

## 2021-07-20 PROCEDURE — 87254 VIRUS INOCULATION SHELL VIA: CPT | Performed by: INTERNAL MEDICINE

## 2021-07-20 PROCEDURE — 88341 IMHCHEM/IMCYTCHM EA ADD ANTB: CPT | Performed by: PATHOLOGY

## 2021-07-20 PROCEDURE — 88342 IMHCHEM/IMCYTCHM 1ST ANTB: CPT | Performed by: PATHOLOGY

## 2021-07-20 PROCEDURE — 85014 HEMATOCRIT: CPT | Performed by: NURSE PRACTITIONER

## 2021-07-20 PROCEDURE — 88305 TISSUE EXAM BY PATHOLOGIST: CPT | Performed by: PATHOLOGY

## 2021-07-20 PROCEDURE — 0DBN8ZX EXCISION OF SIGMOID COLON, VIA NATURAL OR ARTIFICIAL OPENING ENDOSCOPIC, DIAGNOSTIC: ICD-10-PCS | Performed by: INTERNAL MEDICINE

## 2021-07-20 PROCEDURE — 99232 SBSQ HOSP IP/OBS MODERATE 35: CPT | Performed by: NURSE PRACTITIONER

## 2021-07-20 PROCEDURE — 45331 SIGMOIDOSCOPY AND BIOPSY: CPT | Performed by: INTERNAL MEDICINE

## 2021-07-20 PROCEDURE — 85018 HEMOGLOBIN: CPT | Performed by: NURSE PRACTITIONER

## 2021-07-20 PROCEDURE — 0DBP8ZX EXCISION OF RECTUM, VIA NATURAL OR ARTIFICIAL OPENING ENDOSCOPIC, DIAGNOSTIC: ICD-10-PCS | Performed by: INTERNAL MEDICINE

## 2021-07-20 PROCEDURE — 87252 VIRUS INOCULATION TISSUE: CPT | Performed by: INTERNAL MEDICINE

## 2021-07-20 RX ORDER — METHYLPREDNISOLONE SODIUM SUCCINATE 40 MG/ML
40 INJECTION, POWDER, LYOPHILIZED, FOR SOLUTION INTRAMUSCULAR; INTRAVENOUS ONCE
OUTPATIENT
Start: 2021-07-20

## 2021-07-20 RX ORDER — ACETAMINOPHEN 325 MG/1
650 TABLET ORAL ONCE
Status: COMPLETED | OUTPATIENT
Start: 2021-07-20 | End: 2021-07-21

## 2021-07-20 RX ORDER — PROPOFOL 10 MG/ML
INJECTION, EMULSION INTRAVENOUS CONTINUOUS PRN
Status: DISCONTINUED | OUTPATIENT
Start: 2021-07-20 | End: 2021-07-20

## 2021-07-20 RX ORDER — DIPHENHYDRAMINE HCL 25 MG
25 TABLET ORAL ONCE
OUTPATIENT
Start: 2021-07-20

## 2021-07-20 RX ORDER — SODIUM CHLORIDE 9 MG/ML
INJECTION, SOLUTION INTRAVENOUS CONTINUOUS PRN
Status: DISCONTINUED | OUTPATIENT
Start: 2021-07-20 | End: 2021-07-20

## 2021-07-20 RX ORDER — SODIUM CHLORIDE 9 MG/ML
20 INJECTION, SOLUTION INTRAVENOUS ONCE
OUTPATIENT
Start: 2021-07-20

## 2021-07-20 RX ORDER — PROPOFOL 10 MG/ML
INJECTION, EMULSION INTRAVENOUS AS NEEDED
Status: DISCONTINUED | OUTPATIENT
Start: 2021-07-20 | End: 2021-07-20

## 2021-07-20 RX ORDER — LIDOCAINE HYDROCHLORIDE 10 MG/ML
INJECTION, SOLUTION EPIDURAL; INFILTRATION; INTRACAUDAL; PERINEURAL AS NEEDED
Status: DISCONTINUED | OUTPATIENT
Start: 2021-07-20 | End: 2021-07-20

## 2021-07-20 RX ORDER — ACETAMINOPHEN 325 MG/1
650 TABLET ORAL ONCE
OUTPATIENT
Start: 2021-07-20

## 2021-07-20 RX ORDER — DIPHENHYDRAMINE HCL 25 MG
25 TABLET ORAL ONCE
Status: COMPLETED | OUTPATIENT
Start: 2021-07-20 | End: 2021-07-21

## 2021-07-20 RX ADMIN — PROPOFOL 100 MG: 10 INJECTION, EMULSION INTRAVENOUS at 12:00

## 2021-07-20 RX ADMIN — PROPOFOL 170 MCG/KG/MIN: 10 INJECTION, EMULSION INTRAVENOUS at 12:01

## 2021-07-20 RX ADMIN — Medication 250 MG: at 08:26

## 2021-07-20 RX ADMIN — PANTOPRAZOLE SODIUM 40 MG: 40 TABLET, DELAYED RELEASE ORAL at 05:48

## 2021-07-20 RX ADMIN — METHYLPREDNISOLONE SODIUM SUCCINATE 20 MG: 40 INJECTION, POWDER, FOR SOLUTION INTRAMUSCULAR; INTRAVENOUS at 05:48

## 2021-07-20 RX ADMIN — Medication 250 MG: at 17:41

## 2021-07-20 RX ADMIN — FERROUS SULFATE TAB 325 MG (65 MG ELEMENTAL FE) 325 MG: 325 (65 FE) TAB at 08:26

## 2021-07-20 RX ADMIN — LIDOCAINE HYDROCHLORIDE 50 MG: 10 INJECTION, SOLUTION EPIDURAL; INFILTRATION; INTRACAUDAL; PERINEURAL at 12:00

## 2021-07-20 RX ADMIN — METHYLPREDNISOLONE SODIUM SUCCINATE 20 MG: 40 INJECTION, POWDER, FOR SOLUTION INTRAMUSCULAR; INTRAVENOUS at 22:02

## 2021-07-20 RX ADMIN — SODIUM CHLORIDE: 9 INJECTION, SOLUTION INTRAVENOUS at 11:53

## 2021-07-20 RX ADMIN — METHYLPREDNISOLONE SODIUM SUCCINATE 20 MG: 40 INJECTION, POWDER, FOR SOLUTION INTRAMUSCULAR; INTRAVENOUS at 13:25

## 2021-07-20 NOTE — ANESTHESIA PREPROCEDURE EVALUATION
Procedure:  FLEXIBLE SIGMOIDOSCOPY    Relevant Problems   GI/HEPATIC   (+) Rectal bleeding      HEMATOLOGY   (+) ABLA (acute blood loss anemia)   (+) Iron deficiency anemia      Other   (+) Colitis, ulcerative (HCC)        Physical Exam    Airway    Mallampati score: II  TM Distance: >3 FB  Neck ROM: full     Dental       Cardiovascular  Rhythm: regular, Rate: normal,     Pulmonary  Breath sounds clear to auscultation,     Other Findings        Anesthesia Plan  ASA Score- 2     Anesthesia Type- IV sedation with anesthesia with ASA Monitors  Additional Monitors:   Airway Plan:           Plan Factors-    Chart reviewed  Patient is not a current smoker  Induction- intravenous  Postoperative Plan-     Informed Consent- Anesthetic plan and risks discussed with patient  I personally reviewed this patient with the CRNA  Discussed and agreed on the Anesthesia Plan with the CRNA  Sherre Soulier

## 2021-07-20 NOTE — ANESTHESIA POSTPROCEDURE EVALUATION
After Visit Summary   11/28/2018    Julianne Jenkins    MRN: [id number]           Patient Information     Date Of Birth          1983        Visit Information        Provider Department      11/28/2018 10:00 AM Tameka Cruz MD [facility]        Today's Diagnoses     Elderly multigravida in third trimester    -  1    Acute sinusitis with symptoms > 10 days        Low back pain during pregnancy in third trimester          Care Instructions    Start PT for the back pain.     Follow up in 2 weeks to reevaluate the work restrictions.           Follow-ups after your visit        Additional Services     [facility] PT, HAND, AND CHIROPRACTIC REFERRAL       Physical Therapy, Hand Therapy and Chiropractic Care are available through:  *[facility]  *Hand Therapy (Occupational Therapy or Physical Therapy)  *[facility]    Call one number to schedule at any of the above locations: [phone].    Physical therapy, Hand therapy and/or Chiropractic care has been recommended by your physician as an excellent treatment option to reduce pain and help people return to normal activities, including sports.  Therapy and/or chiropractic care services are a great complement or alternative to expensive and invasive surgery, injections, or long-term use of prescription medications. The primary goal is to identify the underlying problem and provide you the tools to manage your condition on your own.     Please be aware that coverage of these services is subject to the terms and limitations of your health insurance plan.  Call member services at your health plan with any benefit or coverage questions.      Please bring the following to your appointment:  *Your personal calendar for scheduling future appointments  *Comfortable clothing                  Follow-up notes from your care team     Return in about 2 weeks (around 12/12/2018) for Follow-up.      Your next  Post-Op Assessment Note    CV Status:  Stable  Pain Score: 0    Pain management: adequate     Mental Status:  Awake and somnolent   Hydration Status:  Stable   PONV Controlled:  None   Airway Patency:  Patent      Post Op Vitals Reviewed: Yes      Staff: CRNA         No complications documented      /59 (07/20/21 1222)    Temp (!) 97 3 °F (36 3 °C) (07/20/21 1222)    Pulse 95 (07/20/21 1222)   Resp 18 (07/20/21 1222)    SpO2 99 % (07/20/21 1222) 10 appointments already scheduled     Nov 30, 2018 10:45 AM CST   MyChart OB-GYN Established Prenatal with Swathi Crisostomo DO   [facility] [place] ([facility])    [address]  [address]  [place] [address]   [phone]            Dec 10, 2018  4:30 PM CST   Office Visit with Tameka Cruz MD   [facility]    [address]  [place] [id number] [phone]           Bring a current list of meds and any records pertaining to this visit. For Physicals, please bring immunization records and any forms needing to be filled out. Please arrive 10 minutes early to complete paperwork.              Future tests that were ordered for you today     Open Future Orders        Priority Expected Expires Ordered    [facility] PT, HAND, AND CHIROPRACTIC REFERRAL Routine  11/28/2019 11/28/2018            Who to contact     If you have questions or need follow up information about today's clinic visit or your schedule please contact [facility] directly at [phone].  Normal or non-critical lab and imaging results will be communicated to you by June Blackboxhart, letter or phone within 4 business days after the clinic has received the results. If you do not hear from us within 7 days, please contact the clinic through [employer] or phone. If you have a critical or abnormal lab result, we will notify you by phone as soon as possible.  Submit refill requests through [employer] or call your pharmacy and they will forward the refill request to us. Please allow 3 business days for your refill to be completed.          Additional Information About Your Visit        June BlackboxharZenMate Information     [employer] gives you secure access to your electronic health record. If you see a primary care provider, you can also send messages to your care team and make appointments. If you have questions, please call your primary care clinic.  If you do not have a primary care  provider, please call [phone] and they will assist you.        Care EveryWhere ID     This is your Care EveryWhere ID. This could be used by other organizations to access your [facility] medical records  [id number]        Your Vitals Were     Pulse Temperature Respirations Last Period Pulse Oximetry Breastfeeding?    98 99  F (37.2  C) (Temporal) 20 05/07/2018 (Exact Date) 97% No    BMI (Body Mass Index)                   31.5 kg/m2            Blood Pressure from Last 3 Encounters:   11/28/18 124/88   11/12/18 100/70   10/15/18 110/72    Weight from Last 3 Encounters:   11/28/18 218 lb (98.9 kg)   11/12/18 219 lb 4 oz (99.5 kg)   10/15/18 226 lb 8 oz (102.7 kg)               Primary Care Provider Fax #    Physician No Ref-Primary [phone]       No address on file        Equal Access to Services     [facility] : Hadii mickie Mcgrath, waaxda luqadaha, qaybta kaalmada adenarinderyada, augustine walker . So [facility] [phone].    ATENCIÓN: Si habla español, tiene a clayton disposición servicios gratuitos de asistencia lingüística. Rachaelame al [phone].    We comply with applicable federal civil rights laws and [place] laws. We do not discriminate on the basis of race, color, national origin, age, disability, sex, sexual orientation, or gender identity.            Thank you!     Thank you for choosing [facility]  for your care. Our goal is always to provide you with excellent care. Hearing back from our patients is one way we can continue to improve our services. Please take a few minutes to complete the written survey that you may receive in the mail after your visit with us. Thank you!             Your Updated Medication List - Protect others around you: Learn how to safely use, store and throw away your medicines at www.disposemymeds.org.          This list is accurate as of 11/28/18 10:58 AM.  Always use your most recent med list.                   Brand Name  Dispense Instructions for use Diagnosis    doxylamine 25 MG Tabs tablet    UNISOM     Take 25 mg by mouth At Bedtime        IBUPROFEN PO      Take 600 mg by mouth        metoclopramide 5 MG tablet    REGLAN    240 tablet    Take 1 tablet (5 mg) by mouth 4 times daily (before meals and nightly)    Nausea and vomiting in pregnancy       omeprazole 20 MG DR capsule    priLOSEC    30 capsule    Take 1 capsule (20 mg) by mouth daily    Gastroesophageal reflux disease without esophagitis       * ondansetron 8 MG tablet    ZOFRAN          * ondansetron 8 MG tablet    ZOFRAN    30 tablet    Take 1 tablet (8 mg) by mouth every 8 hours as needed for nausea    Nausea and vomiting in pregnancy       PRENATAL VITAMIN PO           SUMAtriptan 50 MG tablet    IMITREX     TK ONE T PO UTD.  MAY REPEAT AFTER 2 H.  MG / 24 H.        TUMS PO           TYLENOL 325 MG tablet   Generic drug:  acetaminophen      Take 325-650 mg by mouth every 6 hours as needed        valACYclovir 500 MG tablet    VALTREX    90 tablet    Take 1 tablet (500 mg) by mouth daily    Recurrent cold sores       VITAMIN B 12 PO      Take 1,000 mcg by mouth        VITAMIN B6 PO           ZANTAC PO           * Notice:  This list has 2 medication(s) that are the same as other medications prescribed for you. Read the directions carefully, and ask your doctor or other care provider to review them with you.

## 2021-07-20 NOTE — PROGRESS NOTES
[address] [facility]  Progress Note - Rich Lockhart 2000, 24 y o  male MRN: [id number]  Unit/Bed#: [facility] 816-01 Encounter: [id number]  Primary Care Provider: Nilam Skinner DO   Date and time admitted to hospital: 7/15/2021  9:24 PM    * Ulcerative colitis [facility])  Assessment & Plan  · Patient was recently diagnosed with ulcerative colitis, was being treated with mesalamine and Remicade as outpatient  Received 2 doses without much improvement  Presented with syncopal episode  · CT abdomen pelvis showed worsening colitis  · GI following, on intravenous steroids and PPI  Planning for flex sig today  Depending on results, may consider bowel rest/TPN  GI planning for inpatient Remicade  Repeat CRP today, remains unchanged  · Colorectal surgery following, no plans for surgical intervention at this time  · Follows with Dr Gómez Brock as outpatient    ABLA (acute blood loss anemia)  Assessment & Plan  · Acute blood loss anemia in the setting of ulcer colitis flare as well as iron deficiency anemia  · 1 unit given 7/18, hemoglobin 7 4 today  · Trend H&H    Transaminitis  Assessment & Plan  · Elevated ALT, possibly due to blood loss  · MRI/ MRCP showed normal MRCP with heavy metal deposition in the liver, consistent with hemochromatosis which is likely 2/2 recent iron/blood transfusions  Iron panel not consistent with overload  · Hepatitis B and C, mitochondrial and smooth antibodies negative  · hemochromatosis mutation pending    Iron deficiency anemia  Assessment & Plan  · Continue iron supplementation      VTE Pharmacologic Prophylaxis: VTE Score: 0 Moderate Risk (Score 3-4) - Pharmacological DVT Prophylaxis Contraindicated  Sequential Compression Devices Ordered  Patient Centered Rounds: I performed bedside rounds with nursing staff today    Discussions with Specialists or Other Care Team Provider: nursing, cm    Education and Discussions with Family / Patient: Updated  (mother) at bedside  Time Spent for Care: 20 minutes  More than 50% of total time spent on counseling and coordination of care as described above  Current Length of Stay: 5 day(s)  Current Patient Status: Inpatient   Certification Statement: The patient will continue to require additional inpatient hospital stay due to Inpatient Remicade infusion, flex sigmoidoscopy today  Discharge Plan: Anticipate discharge in >72 hrs to home  Code Status: Level 1 - Full Code    Subjective:   Patient continues to complain of diffuse abdominal pain, worse on left side  Feels worse today compared to the past several days  No nausea or vomiting  Objective:     Vitals:   Temp (24hrs), Av 6 °F (37 °C), Min:98 1 °F (36 7 °C), Max:98 8 °F (37 1 °C)    Temp:  [98 1 °F (36 7 °C)-98 8 °F (37 1 °C)] 98 1 °F (36 7 °C)  HR:  [] 95  Resp:  [16-18] 16  BP: (116-122)/(74-78) 122/78  SpO2:  [96 %-98 %] 97 %  Body mass index is 26 56 kg/m²  Input and Output Summary (last 24 hours): Intake/Output Summary (Last 24 hours) at 2021 0852  Last data filed at 2021 1300  Gross per 24 hour   Intake 360 ml   Output --   Net 360 ml       Physical Exam:   Physical Exam  Vitals and nursing note reviewed  Constitutional:       Appearance: He is ill-appearing  Cardiovascular:      Rate and Rhythm: Normal rate  Pulmonary:      Breath sounds: Normal breath sounds  Abdominal:      Palpations: Abdomen is soft  Tenderness: There is abdominal tenderness (Diffuse however worse on left side/LLQ)  Musculoskeletal:         General: No swelling  Skin:     Coloration: Skin is pale  Neurological:      Mental Status: He is alert and oriented to person, place, and time  Mental status is at baseline     Psychiatric:         Mood and Affect: Mood normal           Additional Data:     Labs:  Results from last 7 days   Lab Units 21  0554 21  1515 21  0540   WBC Thousand/uL  --  6 84 6 63 HEMOGLOBIN g/dL 7 4* 7 8* 7 0*   HEMATOCRIT % 24 9* 25 8* 23 6*   PLATELETS Thousands/uL  --  274 261   NEUTROS PCT %  --   --  63   LYMPHS PCT %  --   --  22   MONOS PCT %  --   --  12   EOS PCT %  --   --  1     Results from last 7 days   Lab Units 07/17/21  0540   SODIUM mmol/L 137   POTASSIUM mmol/L 4 3   CHLORIDE mmol/L 104   CO2 mmol/L 27   BUN mg/dL 15   CREATININE mg/dL 0 51*   ANION GAP mmol/L 6   CALCIUM mg/dL 8 7   ALBUMIN g/dL 1 9*   TOTAL BILIRUBIN mg/dL 0 41   ALK PHOS U/L 71   ALT U/L 124*   AST U/L 22   GLUCOSE RANDOM mg/dL 98                       Lines/Drains:  Invasive Devices     Peripheral Intravenous Line            Peripheral IV 07/18/21 Dorsal (posterior); Left Forearm 2 days                      Imaging: No pertinent imaging reviewed  Recent Cultures (last 7 days):         Last 24 Hours Medication List:   Current Facility-Administered Medications   Medication Dose Route Frequency Provider Last Rate    acetaminophen  650 mg Oral Q4H PRN Klaus Simon MD      ferrous sulfate  325 mg Oral Daily With Breakfast Klaus Simon MD      methylPREDNISolone sodium succinate  20 mg Intravenous [facility] Klaus Simon MD      ondansetron  4 mg Intravenous Q4H PRN Klaus Simon MD      pantoprazole  40 mg Oral Early Morning Klaus Simon MD      saccharomyces boulardii  250 mg Oral BID Klaus Simon MD          Today, Patient Was Seen By: J CARLOS Vora    **Please Note: This note may have been constructed using a voice recognition system  **

## 2021-07-20 NOTE — PROGRESS NOTES
Notified family of time of sigmoidoscopy and need for tap water enemas; pt's mother states "I was told he doesn't need them"; spoke with gi lab, and was informed as per dr Adilia Berrios, 'tap water enemas do not need to be administered'; notified pt and his mother

## 2021-07-20 NOTE — PROGRESS NOTES
Pt to be transferred to p9 for remicade infusion; no beds available at this time; dr Perla Canavan, pharmacy, pt and his mom aware

## 2021-07-20 NOTE — ASSESSMENT & PLAN NOTE
· Acute blood loss anemia in the setting of ulcer colitis flare as well as iron deficiency anemia  · 1 unit given 7/18, hemoglobin 7 4 today     · Trend H&H

## 2021-07-20 NOTE — PLAN OF CARE
Problem: PAIN - ADULT  Goal: Verbalizes/displays adequate comfort level or baseline comfort level  Description: Interventions:  - Encourage patient to monitor pain and request assistance  - Assess pain using appropriate pain scale  - Administer analgesics based on type and severity of pain and evaluate response  - Implement non-pharmacological measures as appropriate and evaluate response  - Consider cultural and social influences on pain and pain management  - Notify physician/advanced practitioner if interventions unsuccessful or patient reports new pain  Outcome: Progressing     Problem: DISCHARGE PLANNING  Goal: Discharge to home or other facility with appropriate resources  Description: INTERVENTIONS:  - Identify barriers to discharge w/patient and caregiver  - Arrange for needed discharge resources and transportation as appropriate  - Identify discharge learning needs (meds, wound care, etc )  - Arrange for interpretive services to assist at discharge as needed  - Refer to Case Management Department for coordinating discharge planning if the patient needs post-hospital services based on physician/advanced practitioner order or complex needs related to functional status, cognitive ability, or social support system  Outcome: Progressing     Problem: Knowledge Deficit  Goal: Patient/family/caregiver demonstrates understanding of disease process, treatment plan, medications, and discharge instructions  Description: Complete learning assessment and assess knowledge base    Interventions:  - Provide teaching at level of understanding  - Provide teaching via preferred learning methods  Outcome: Progressing     Problem: Potential for Falls  Goal: Patient will remain free of falls  Description: INTERVENTIONS:  - Educate patient/family on patient safety including physical limitations  - Instruct patient to call for assistance with activity   - Consult OT/PT to assist with strengthening/mobility   - Keep Call bell within reach  - Keep bed low and locked with side rails adjusted as appropriate  - Keep care items and personal belongings within reach  - Initiate and maintain comfort rounds  - Apply yellow socks and bracelet for high fall risk patients  - Consider moving patient to room near nurses station  Outcome: Progressing     Problem: INFECTION - ADULT  Goal: Absence or prevention of progression during hospitalization  Description: INTERVENTIONS:  - Assess and monitor for signs and symptoms of infection  - Monitor lab/diagnostic results  - Monitor all insertion sites, i e  indwelling lines, tubes, and drains  - Monitor endotracheal if appropriate and nasal secretions for changes in amount and color  - [place] appropriate cooling/warming therapies per order  - Administer medications as ordered  - Instruct and encourage patient and family to use good hand hygiene technique  - Identify and instruct in appropriate isolation precautions for identified infection/condition  Outcome: Progressing     Problem: GASTROINTESTINAL - ADULT  Goal: Maintains or returns to baseline bowel function  Description: INTERVENTIONS:  - Assess bowel function  - Encourage oral fluids to ensure adequate hydration  - Administer IV fluids if ordered to ensure adequate hydration  - Administer ordered medications as needed  - Encourage mobilization and activity  - Consider nutritional services referral to assist patient with adequate nutrition and appropriate food choices  Outcome: Progressing  Goal: Maintains adequate nutritional intake  Description: INTERVENTIONS:  - Monitor percentage of each meal consumed  - Identify factors contributing to decreased intake, treat as appropriate  - Assist with meals as needed  - Monitor I&O, weight, and lab values if indicated  - Obtain nutrition services referral as needed  Outcome: Progressing     Problem: METABOLIC, FLUID AND ELECTROLYTES - ADULT  Goal: Electrolytes maintained within normal limits  Description: INTERVENTIONS:  - Monitor labs and assess patient for signs and symptoms of electrolyte imbalances  - Administer electrolyte replacement as ordered  - Monitor response to electrolyte replacements, including repeat lab results as appropriate  - Instruct patient on fluid and nutrition as appropriate  Outcome: Progressing     Problem: HEMATOLOGIC - ADULT  Goal: Maintains hematologic stability  Description: INTERVENTIONS  - Assess for signs and symptoms of bleeding or hemorrhage  - Monitor labs  - Administer supportive blood products/factors as ordered and appropriate  Outcome: Progressing

## 2021-07-20 NOTE — ASSESSMENT & PLAN NOTE
· Patient was recently diagnosed with ulcerative colitis, was being treated with mesalamine and Remicade as outpatient  Received 2 doses without much improvement  Presented with syncopal episode  · CT abdomen pelvis showed worsening colitis  · GI following, on intravenous steroids and PPI  Planning for flex sig today  Depending on results, may consider bowel rest/TPN  GI planning for inpatient Remicade  Repeat CRP today, remains unchanged  · Colorectal surgery following, no plans for surgical intervention at this time     · Follows with Dr Abram Ramirez as outpatient

## 2021-07-21 LAB
HCT VFR BLD AUTO: 23.8 % (ref 36.5–49.3)
HGB BLD-MCNC: 7 G/DL (ref 12–17)

## 2021-07-21 PROCEDURE — 85014 HEMATOCRIT: CPT | Performed by: NURSE PRACTITIONER

## 2021-07-21 PROCEDURE — C1751 CATH, INF, PER/CENT/MIDLINE: HCPCS

## 2021-07-21 PROCEDURE — 36569 INSJ PICC 5 YR+ W/O IMAGING: CPT

## 2021-07-21 PROCEDURE — 85018 HEMOGLOBIN: CPT | Performed by: NURSE PRACTITIONER

## 2021-07-21 PROCEDURE — 99233 SBSQ HOSP IP/OBS HIGH 50: CPT | Performed by: HOSPITALIST

## 2021-07-21 PROCEDURE — 99233 SBSQ HOSP IP/OBS HIGH 50: CPT | Performed by: COLON & RECTAL SURGERY

## 2021-07-21 PROCEDURE — 3E0336Z INTRODUCTION OF NUTRITIONAL SUBSTANCE INTO PERIPHERAL VEIN, PERCUTANEOUS APPROACH: ICD-10-PCS | Performed by: COLON & RECTAL SURGERY

## 2021-07-21 PROCEDURE — 99232 SBSQ HOSP IP/OBS MODERATE 35: CPT | Performed by: INTERNAL MEDICINE

## 2021-07-21 RX ORDER — DIPHENHYDRAMINE HYDROCHLORIDE 50 MG/ML
INJECTION INTRAMUSCULAR; INTRAVENOUS
Status: DISCONTINUED
Start: 2021-07-21 | End: 2021-07-21 | Stop reason: WASHOUT

## 2021-07-21 RX ORDER — DEXTROSE, SODIUM CHLORIDE, AND POTASSIUM CHLORIDE 5; .45; .15 G/100ML; G/100ML; G/100ML
75 INJECTION INTRAVENOUS CONTINUOUS
Status: DISCONTINUED | OUTPATIENT
Start: 2021-07-21 | End: 2021-07-27

## 2021-07-21 RX ADMIN — DEXTROSE, SODIUM CHLORIDE, AND POTASSIUM CHLORIDE 75 ML/HR: 5; .45; .15 INJECTION INTRAVENOUS at 15:13

## 2021-07-21 RX ADMIN — INFLIXIMAB 1000 MG: 100 INJECTION, POWDER, LYOPHILIZED, FOR SOLUTION INTRAVENOUS at 15:46

## 2021-07-21 RX ADMIN — Medication 250 MG: at 18:18

## 2021-07-21 RX ADMIN — DIPHENHYDRAMINE HCL 25 MG: 25 TABLET ORAL at 16:13

## 2021-07-21 RX ADMIN — ACETAMINOPHEN 650 MG: 325 TABLET, FILM COATED ORAL at 16:13

## 2021-07-21 RX ADMIN — METHYLPREDNISOLONE SODIUM SUCCINATE 20 MG: 40 INJECTION, POWDER, FOR SOLUTION INTRAMUSCULAR; INTRAVENOUS at 13:49

## 2021-07-21 RX ADMIN — FERROUS SULFATE TAB 325 MG (65 MG ELEMENTAL FE) 325 MG: 325 (65 FE) TAB at 08:09

## 2021-07-21 RX ADMIN — METHYLPREDNISOLONE SODIUM SUCCINATE 20 MG: 40 INJECTION, POWDER, FOR SOLUTION INTRAMUSCULAR; INTRAVENOUS at 05:48

## 2021-07-21 RX ADMIN — Medication 250 MG: at 08:09

## 2021-07-21 RX ADMIN — Medication: at 21:11

## 2021-07-21 RX ADMIN — PANTOPRAZOLE SODIUM 40 MG: 40 TABLET, DELAYED RELEASE ORAL at 05:47

## 2021-07-21 RX ADMIN — METHYLPREDNISOLONE SODIUM SUCCINATE 20 MG: 40 INJECTION, POWDER, FOR SOLUTION INTRAMUSCULAR; INTRAVENOUS at 21:11

## 2021-07-21 NOTE — NURSING NOTE
Remicade infusion administered by P9 FABIOLA Antonio  Approx 15 minutes into administration, patient's Mom called that patient was experiencing shortness of breath  Patient's face flushed, 95% on RA  2L O2 placed  Tachy 110s  P9 RN notified along with Attending Dr Lacho Valencia and ordering Dr Lzibet Cheek  0112: Tylenol & Benadryl given at time of reaction by P9 RN  Patient reassessed at this time, feeling better  SOB resolved  99% on 2L  Redness in face gone  Infusion restarted per P9 RN  4199: Infusion completed  Patient tolerated remainder of infusion without difficulty  P9 RN called to disconnect infusion and complete

## 2021-07-21 NOTE — PROCEDURES
Insert PICC line    Date/Time: 7/21/2021 3:18 PM  Performed by: Joe Espinosa RN  Authorized by: Neel Schmidt MD     Patient location:  Bedside  Other Assisting Provider: Yes (comment) Corinne HSP D/P [facility] Infusion tech)    Consent:     Consent obtained:  Written (Consent obtained by physician)    Consent given by:  Patient  Universal protocol:     Procedure explained and questions answered to patient or proxy's satisfaction: yes      Relevant documents present and verified: yes      Test results available and properly labeled: yes      Radiology Images displayed and confirmed  If images not available, report reviewed: yes      Required blood products, implants, devices, and special equipment available: yes      Site/side marked: yes      Immediately prior to procedure, a time out was called: yes      Patient identity confirmed:  Verbally with patient and arm band  Pre-procedure details:     Hand hygiene: Hand hygiene performed prior to insertion      Sterile barrier technique: All elements of maximal sterile technique followed      Skin preparation:  ChloraPrep    Skin preparation agent: Skin preparation agent completely dried prior to procedure    Indications:     PICC line indications: no peripheral vascular access    Anesthesia (see MAR for exact dosages):      Anesthesia method:  Local infiltration    Local anesthetic:  Lidocaine 1% w/o epi (3ml)  Procedure details:     Location:  Basilic    Vessel type: vein      Laterality:  Right    Approach: percutaneous technique used      Patient position:  Flat    Procedural supplies:  Double lumen    Catheter size:  5 Fr    Landmarks identified: yes      Ultrasound guidance: yes      Ultrasound image availability:  Not saved    Sterile ultrasound techniques: Sterile gel and sterile probe covers were used      Number of attempts:  1    Successful placement: yes      Vessel of catheter tip end:  Sherlock 3CG confirmed    Total catheter length (cm):  48    Catheter out on skin (cm):  1    Max flow rate:  999    Arm circumference:  29  Post-procedure details:     Post-procedure:  Securement device placed and dressing applied    Assessment:  Blood return through all ports and free fluid flow    Post-procedure complications: none      Patient tolerance of procedure: Tolerated well, no immediate complications  Comments:      Pt with c/o pain in hand from prior IV   No issues with insertion

## 2021-07-21 NOTE — PROGRESS NOTES
Progress Note - Colorectal Surgery   Chaya Marrero 24 y o  male MRN: [id number]  Unit/Bed#: [facility] 816-01 Encounter: [id number]    Assessment:  25-yr old male with UC with poor response to medical management      Still having bloody BMs  Hb this morning pending  Plan:  - Remicade and steroids per GI (need to move to 9 per mom)  - follow AM Hb   - other care per primary  Subjective/Objective     Subjective:   No acute events overnight  =BMx 4 bloody and liquid  +F  -N  -V      Objective:     Blood pressure 117/66, pulse 98, temperature 98 2 °F (36 8 °C), resp  rate 18, height 6' 3" (1 905 m), weight 96 4 kg (212 lb 8 4 oz), SpO2 95 %  ,Body mass index is 26 56 kg/m²  Intake/Output Summary (Last 24 hours) at 7/21/2021 0743  Last data filed at 7/20/2021 2313  Gross per 24 hour   Intake 520 ml   Output 0 ml   Net 520 ml       Invasive Devices     Peripheral Intravenous Line            Peripheral IV 07/18/21 Dorsal (posterior); Left Forearm 3 days    Peripheral IV 07/21/21 Left Antecubital <1 day                Physical Exam:   Gen: NAD, Comfortable  Neuro: A&O, No focal deficits  Head: Normal Cephalic, Atraumatic  Eye: EOMI, PERRLA, No scleral icterus  Neck: Supple, No JVD, Midline trachea  CV: RRR, Cap refill <2 sec  Pulm: Normal work of breathing, no respiratory distress  Abd: Soft, Non-Distended, slight TTP diffusely  Ext: No edema, Non-tender  Skin: warm, dry, intact

## 2021-07-21 NOTE — PROGRESS NOTES
Progress Note- Chidi Hu 24 y o  male MRN: [id number]    [facility] Encounter: [id number]      Assessment and Plan:    Ulcerative colitis refractory to IV steroids  · Continue IV methylprednisolone, low fat low residue diet, iron supplements  · Will receive Remicade when bed on 9th floor is available - none open at 0815  · Hg 7 today - consider IV iron and blood transfusion tomorrow after he receives Remicade  · Monitor electrolytes, Hg  · Sigmoidoscopy yesterday showed severe colitis from rectum to sigmoid  · CRP 75 8, unchanged from yesterday  · Hemochromatosis mutation negative  · Pending CMV    ______________________________________________________________________    Subjective:     Still having bloody bowel movements up to 4 times a day  Diffuse abdominal pain unchanged from yesterday  Eating and drinking Ensure  No nausea, vomiting  Hemochromatosis mutations negative  Awaiting available bed on ninth floor to start Remicade        Medication Administration - last 24 hours from 07/20/2021 0850 to 07/21/2021 0850       Date/Time Order Dose Route Action Action by     07/21/2021 0809 ferrous sulfate tablet 325 mg 325 mg Oral Given Marica Kawasaki, RN     07/21/2021 0547 pantoprazole (PROTONIX) EC tablet 40 mg 40 mg Oral Given Shayan Montalvo RN     07/21/2021 0809 saccharomyces boulardii (FLORASTOR) capsule 250 mg 250 mg Oral Given Marica Kawasaki, RN     07/20/2021 1741 saccharomyces boulardii (FLORASTOR) capsule 250 mg 250 mg Oral Given Jovita Paredes RN     07/21/2021 0548 methylPREDNISolone sodium succinate (Solu-MEDROL) injection 20 mg 20 mg Intravenous Given Shayan Montalvo RN     07/20/2021 2202 methylPREDNISolone sodium succinate (Solu-MEDROL) injection 20 mg 20 mg Intravenous Given Irving Jung RN     07/20/2021 1325 methylPREDNISolone sodium succinate (Solu-MEDROL) injection 20 mg 20 mg Intravenous Given Jovita Paredes RN          Objective:     Vitals: Blood pressure 117/66, pulse 98, temperature 98 2 °F (36 8 °C), temperature source Oral, resp  rate 18, height 6' 3" (1 905 m), weight 96 4 kg (212 lb 8 4 oz), SpO2 95 %  ,Body mass index is 26 56 kg/m²  Intake/Output Summary (Last 24 hours) at 7/21/2021 0850  Last data filed at 7/20/2021 2313  Gross per 24 hour   Intake 520 ml   Output 0 ml   Net 520 ml       Physical Exam:   General Appearance: Awake and alert, in no acute distress  Abdomen: Diffuse tenderness, soft, non-distended; bowel sounds normal; no masses or no organomegaly    Invasive Devices     Peripheral Intravenous Line            Peripheral IV 07/18/21 Dorsal (posterior); Left Forearm 3 days    Peripheral IV 07/21/21 Left Antecubital <1 day                Lab Results:  Admission on 07/15/2021   Component Date Value    WBC 07/16/2021 6 94     RBC 07/16/2021 3 05*    Hemoglobin 07/16/2021 7 5*    Hematocrit 07/16/2021 25 5*    MCV 07/16/2021 84     MCH 07/16/2021 24 6*    MCHC 07/16/2021 29 4*    RDW 07/16/2021 18 7*    Platelets 41/22/6012 256     MPV 07/16/2021 11 2     Sodium 07/16/2021 137     Potassium 07/16/2021 4 3     Chloride 07/16/2021 103     CO2 07/16/2021 28     ANION GAP 07/16/2021 6     BUN 07/16/2021 14     Creatinine 07/16/2021 0 49*    Glucose 07/16/2021 107     Calcium 07/16/2021 8 9     eGFR 07/16/2021 156     WBC 07/17/2021 6 63     RBC 07/17/2021 2 84*    Hemoglobin 07/17/2021 7 0*    Hematocrit 07/17/2021 23 6*    MCV 07/17/2021 83     MCH 07/17/2021 24 6*    MCHC 07/17/2021 29 7*    RDW 07/17/2021 18 9*    MPV 07/17/2021 11 1     Platelets 17/28/6628 261     nRBC 07/17/2021 1     Neutrophils Relative 07/17/2021 63     Immat GRANS % 07/17/2021 2     Lymphocytes Relative 07/17/2021 22     Monocytes Relative 07/17/2021 12     Eosinophils Relative 07/17/2021 1     Basophils Relative 07/17/2021 0     Neutrophils Absolute 07/17/2021 4 22     Immature Grans Absolute 07/17/2021 0 10     Lymphocytes Absolute 07/17/2021 1 47     Monocytes Absolute 07/17/2021 0 77     Eosinophils Absolute 07/17/2021 0 05     Basophils Absolute 07/17/2021 0 02     Sodium 07/17/2021 137     Potassium 07/17/2021 4 3     Chloride 07/17/2021 104     CO2 07/17/2021 27     ANION GAP 07/17/2021 6     BUN 07/17/2021 15     Creatinine 07/17/2021 0 51*    Glucose 07/17/2021 98     Calcium 07/17/2021 8 7     Corrected Calcium 07/17/2021 10 4*    AST 07/17/2021 22     ALT 07/17/2021 124*    Alkaline Phosphatase 07/17/2021 71     Total Protein 07/17/2021 6 2*    Albumin 07/17/2021 1 9*    Total Bilirubin 07/17/2021 0 41     eGFR 07/17/2021 154     Hemoglobin 07/18/2021 6 9*    Hematocrit 07/18/2021 23 2*    ABO Grouping 07/18/2021 O     Rh Factor 07/18/2021 Positive     Antibody Screen 07/18/2021 Negative     Specimen Expiration Date 07/18/2021 [id number]     Unit Product Code 07/19/2021 [id number]     Unit Number 07/19/2021 [id number]     Unit ABO 07/19/2021 O     Unit RH 07/19/2021 NEG     Crossmatch 07/19/2021 Compatible     Unit Dispense Status 07/19/2021 [facility]     Unit Product Code 07/19/2021 E8075P31     Unit Number 07/19/2021 [id number]     Unit ABO 07/19/2021 O     Unit RH 07/19/2021 NEG     Crossmatch 07/19/2021 Compatible     Unit Dispense Status 07/19/2021 Presumed Trans     WBC 07/18/2021 6 84     RBC 07/18/2021 3 04*    Hemoglobin 07/18/2021 7 8*    Hematocrit 07/18/2021 25 8*    MCV 07/18/2021 85     MCH 07/18/2021 25 7*    MCHC 07/18/2021 30 2*    RDW 07/18/2021 19 5*    Platelets 81/03/9303 274     MPV 07/18/2021 10 7     Hemoglobin 07/19/2021 7 5*    Hematocrit 07/19/2021 25 0*    Hemoglobin 07/20/2021 7 4*    Hematocrit 07/20/2021 24 9*    CRP 07/20/2021 75 8*    Hemoglobin 07/21/2021 7 0*    Hematocrit 07/21/2021 23 8*       Imaging Studies: I have personally reviewed pertinent imaging studies

## 2021-07-21 NOTE — NURSING NOTE
Remicade infusion administered  Call received by primary RN Janice Rodriguez stating patient has redness and difficulty breathing  PRN tylenol and Benadryl given  Infusion on hold till reaction subsides  Primary team aware per [facility]ane[facility] Mail

## 2021-07-21 NOTE — PLAN OF CARE
Problem: PAIN - ADULT  Goal: Verbalizes/displays adequate comfort level or baseline comfort level  Description: Interventions:  - Encourage patient to monitor pain and request assistance  - Assess pain using appropriate pain scale  - Administer analgesics based on type and severity of pain and evaluate response  - Implement non-pharmacological measures as appropriate and evaluate response  - Consider cultural and social influences on pain and pain management  - Notify physician/advanced practitioner if interventions unsuccessful or patient reports new pain  Outcome: Progressing     Problem: DISCHARGE PLANNING  Goal: Discharge to home or other facility with appropriate resources  Description: INTERVENTIONS:  - Identify barriers to discharge w/patient and caregiver  - Arrange for needed discharge resources and transportation as appropriate  - Identify discharge learning needs (meds, wound care, etc )  - Arrange for interpretive services to assist at discharge as needed  - Refer to Case Management Department for coordinating discharge planning if the patient needs post-hospital services based on physician/advanced practitioner order or complex needs related to functional status, cognitive ability, or social support system  Outcome: Progressing     Problem: Knowledge Deficit  Goal: Patient/family/caregiver demonstrates understanding of disease process, treatment plan, medications, and discharge instructions  Description: Complete learning assessment and assess knowledge base    Interventions:  - Provide teaching at level of understanding  - Provide teaching via preferred learning methods  Outcome: Progressing     Problem: Potential for Falls  Goal: Patient will remain free of falls  Description: INTERVENTIONS:  - Educate patient/family on patient safety including physical limitations  - Instruct patient to call for assistance with activity   - Consult OT/PT to assist with strengthening/mobility   - Keep Call bell within reach  - Keep bed low and locked with side rails adjusted as appropriate  - Keep care items and personal belongings within reach  - Initiate and maintain comfort rounds  - Make Fall Risk Sign visible to staff  - Apply yellow socks and bracelet for high fall risk patients  - Consider moving patient to room near nurses station  Outcome: Progressing     Problem: INFECTION - ADULT  Goal: Absence or prevention of progression during hospitalization  Description: INTERVENTIONS:  - Assess and monitor for signs and symptoms of infection  - Monitor lab/diagnostic results  - Monitor all insertion sites, i e  indwelling lines, tubes, and drains  - Monitor endotracheal if appropriate and nasal secretions for changes in amount and color  - [place] appropriate cooling/warming therapies per order  - Administer medications as ordered  - Instruct and encourage patient and family to use good hand hygiene technique  - Identify and instruct in appropriate isolation precautions for identified infection/condition  Outcome: Progressing     Problem: GASTROINTESTINAL - ADULT  Goal: Maintains or returns to baseline bowel function  Description: INTERVENTIONS:  - Assess bowel function  - Encourage oral fluids to ensure adequate hydration  - Administer IV fluids if ordered to ensure adequate hydration  - Administer ordered medications as needed  - Encourage mobilization and activity  - Consider nutritional services referral to assist patient with adequate nutrition and appropriate food choices  Outcome: Progressing  Goal: Maintains adequate nutritional intake  Description: INTERVENTIONS:  - Monitor percentage of each meal consumed  - Identify factors contributing to decreased intake, treat as appropriate  - Assist with meals as needed  - Monitor I&O, weight, and lab values if indicated  - Obtain nutrition services referral as needed  Outcome: Progressing     Problem: METABOLIC, FLUID AND ELECTROLYTES - ADULT  Goal: Electrolytes maintained within normal limits  Description: INTERVENTIONS:  - Monitor labs and assess patient for signs and symptoms of electrolyte imbalances  - Administer electrolyte replacement as ordered  - Monitor response to electrolyte replacements, including repeat lab results as appropriate  - Instruct patient on fluid and nutrition as appropriate  Outcome: Progressing     Problem: HEMATOLOGIC - ADULT  Goal: Maintains hematologic stability  Description: INTERVENTIONS  - Assess for signs and symptoms of bleeding or hemorrhage  - Monitor labs  - Administer supportive blood products/factors as ordered and appropriate  Outcome: Progressing

## 2021-07-22 PROBLEM — E43 SEVERE PROTEIN-CALORIE MALNUTRITION (HCC): Status: ACTIVE | Noted: 2021-07-22

## 2021-07-22 LAB
ABO GROUP BLD: NORMAL
ANISOCYTOSIS BLD QL SMEAR: PRESENT
BASOPHILS # BLD MANUAL: 0.07 THOUSAND/UL (ref 0–0.1)
BASOPHILS NFR MAR MANUAL: 1 % (ref 0–1)
BLD GP AB SCN SERPL QL: NEGATIVE
DACRYOCYTES BLD QL SMEAR: PRESENT
EOSINOPHIL # BLD MANUAL: 0.13 THOUSAND/UL (ref 0–0.4)
EOSINOPHIL NFR BLD MANUAL: 2 % (ref 0–6)
ERYTHROCYTE [DISTWIDTH] IN BLOOD BY AUTOMATED COUNT: 19.9 % (ref 11.6–15.1)
GLUCOSE SERPL-MCNC: 120 MG/DL (ref 65–140)
GLUCOSE SERPL-MCNC: 151 MG/DL (ref 65–140)
GLUCOSE SERPL-MCNC: 181 MG/DL (ref 65–140)
HCT VFR BLD AUTO: 21.1 % (ref 36.5–49.3)
HGB BLD-MCNC: 6.2 G/DL (ref 12–17)
HYPERCHROMIA BLD QL SMEAR: PRESENT
LYMPHOCYTES # BLD AUTO: 1.74 THOUSAND/UL (ref 0.6–4.47)
LYMPHOCYTES # BLD AUTO: 26 % (ref 14–44)
MCH RBC QN AUTO: 25.4 PG (ref 26.8–34.3)
MCHC RBC AUTO-ENTMCNC: 29.4 G/DL (ref 31.4–37.4)
MCV RBC AUTO: 87 FL (ref 82–98)
METAMYELOCYTES NFR BLD MANUAL: 1 % (ref 0–1)
MICROCYTES BLD QL AUTO: PRESENT
MONOCYTES # BLD AUTO: 0.94 THOUSAND/UL (ref 0–1.22)
MONOCYTES NFR BLD: 14 % (ref 4–12)
MYELOCYTES NFR BLD MANUAL: 1 % (ref 0–1)
NEUTROPHILS # BLD MANUAL: 3.68 THOUSAND/UL (ref 1.85–7.62)
NEUTS BAND NFR BLD MANUAL: 8 % (ref 0–8)
NEUTS SEG NFR BLD AUTO: 47 % (ref 43–75)
NRBC BLD AUTO-RTO: 1 /100 WBCS
PLATELET # BLD AUTO: 256 THOUSANDS/UL (ref 149–390)
PLATELET BLD QL SMEAR: ADEQUATE
PMV BLD AUTO: 11 FL (ref 8.9–12.7)
POLYCHROMASIA BLD QL SMEAR: PRESENT
RBC # BLD AUTO: 2.44 MILLION/UL (ref 3.88–5.62)
RBC MORPH BLD: PRESENT
RH BLD: POSITIVE
SPECIMEN EXPIRATION DATE: NORMAL
WBC # BLD AUTO: 6.69 THOUSAND/UL (ref 4.31–10.16)

## 2021-07-22 PROCEDURE — 99233 SBSQ HOSP IP/OBS HIGH 50: CPT | Performed by: COLON & RECTAL SURGERY

## 2021-07-22 PROCEDURE — 85027 COMPLETE CBC AUTOMATED: CPT | Performed by: HOSPITALIST

## 2021-07-22 PROCEDURE — P9016 RBC LEUKOCYTES REDUCED: HCPCS

## 2021-07-22 PROCEDURE — 99233 SBSQ HOSP IP/OBS HIGH 50: CPT | Performed by: HOSPITALIST

## 2021-07-22 PROCEDURE — 30233N1 TRANSFUSION OF NONAUTOLOGOUS RED BLOOD CELLS INTO PERIPHERAL VEIN, PERCUTANEOUS APPROACH: ICD-10-PCS | Performed by: COLON & RECTAL SURGERY

## 2021-07-22 PROCEDURE — 86923 COMPATIBILITY TEST ELECTRIC: CPT

## 2021-07-22 PROCEDURE — 82948 REAGENT STRIP/BLOOD GLUCOSE: CPT

## 2021-07-22 PROCEDURE — 85007 BL SMEAR W/DIFF WBC COUNT: CPT | Performed by: HOSPITALIST

## 2021-07-22 PROCEDURE — 86900 BLOOD TYPING SEROLOGIC ABO: CPT | Performed by: STUDENT IN AN ORGANIZED HEALTH CARE EDUCATION/TRAINING PROGRAM

## 2021-07-22 PROCEDURE — 86901 BLOOD TYPING SEROLOGIC RH(D): CPT | Performed by: STUDENT IN AN ORGANIZED HEALTH CARE EDUCATION/TRAINING PROGRAM

## 2021-07-22 PROCEDURE — 86850 RBC ANTIBODY SCREEN: CPT | Performed by: STUDENT IN AN ORGANIZED HEALTH CARE EDUCATION/TRAINING PROGRAM

## 2021-07-22 PROCEDURE — 99233 SBSQ HOSP IP/OBS HIGH 50: CPT | Performed by: INTERNAL MEDICINE

## 2021-07-22 RX ADMIN — DEXTROSE: 30 INJECTION, SOLUTION INTRAVENOUS at 21:37

## 2021-07-22 RX ADMIN — METHYLPREDNISOLONE SODIUM SUCCINATE 20 MG: 40 INJECTION, POWDER, FOR SOLUTION INTRAMUSCULAR; INTRAVENOUS at 21:37

## 2021-07-22 RX ADMIN — METHYLPREDNISOLONE SODIUM SUCCINATE 20 MG: 40 INJECTION, POWDER, FOR SOLUTION INTRAMUSCULAR; INTRAVENOUS at 05:19

## 2021-07-22 RX ADMIN — IRON SUCROSE 300 MG: 20 INJECTION, SOLUTION INTRAVENOUS at 12:43

## 2021-07-22 RX ADMIN — Medication 250 MG: at 09:53

## 2021-07-22 RX ADMIN — METHYLPREDNISOLONE SODIUM SUCCINATE 20 MG: 40 INJECTION, POWDER, FOR SOLUTION INTRAMUSCULAR; INTRAVENOUS at 13:19

## 2021-07-22 RX ADMIN — PANTOPRAZOLE SODIUM 40 MG: 40 TABLET, DELAYED RELEASE ORAL at 05:19

## 2021-07-22 RX ADMIN — Medication 250 MG: at 17:40

## 2021-07-22 RX ADMIN — INSULIN LISPRO 1 UNITS: 100 INJECTION, SOLUTION INTRAVENOUS; SUBCUTANEOUS at 12:00

## 2021-07-22 RX ADMIN — DEXTROSE, SODIUM CHLORIDE, AND POTASSIUM CHLORIDE 75 ML/HR: 5; .45; .15 INJECTION INTRAVENOUS at 04:35

## 2021-07-22 RX ADMIN — ENOXAPARIN SODIUM 40 MG: 40 INJECTION SUBCUTANEOUS at 09:53

## 2021-07-22 RX ADMIN — DEXTROSE, SODIUM CHLORIDE, AND POTASSIUM CHLORIDE 75 ML/HR: 5; .45; .15 INJECTION INTRAVENOUS at 15:13

## 2021-07-22 NOTE — ASSESSMENT & PLAN NOTE
· Patient was recently diagnosed with ulcerative colitis, was being treated with mesalamine and Remicade as outpatient  Received 2 doses without much improvement  Presented with syncopal episode  · CT abdomen pelvis showed worsening colitis  · GI following, on intravenous steroids and PPI  · s/p flex sig on 7/20 - severe colitis   Biopsies taken to rule out CMV  · Repeat CRP, remains unchanged  · remicaid infusion given on 7/21  · Colorectal surgery following - not a candidate for surgery at present given acute inflammation  · Per GI - keep NPO for bowel rest &give TPN  · Nutrition recs appreciated - will adjust TPN per that  · Follows with Dr Sheldon Proctor as outpatient

## 2021-07-22 NOTE — PROGRESS NOTES
[address] [facility]  Progress Note - Roya Ospina 2000, 24 y o  male MRN: [id number]  Unit/Bed#: [facility] 816-01 Encounter: [id number]  Primary Care Provider: Hilario Chua DO   Date and time admitted to hospital: 7/15/2021  9:24 PM    * Colitis, ulcerative ([facility] [address] )  Assessment & Plan  · Patient was recently diagnosed with ulcerative colitis, was being treated with mesalamine and Remicade as outpatient  Received 2 doses without much improvement  Presented with syncopal episode  · CT abdomen pelvis showed worsening colitis  · GI following, on intravenous steroids and PPI  · s/p flex sig on 7/20 - severe colitis  Biopsies taken to rule out CMV  · Repeat CRP, remains unchanged  · remicaid infusion given on 7/21  · Colorectal surgery following - not a candidate for surgery at present given acute inflammation  · Per GI - keep NPO for bowel rest &give TPN  · Nutrition recs appreciated - will adjust TPN per that  · Follows with Dr Luann Liu as outpatient    ABLA (acute blood loss anemia)  Assessment & Plan  · Acute blood loss anemia in the setting of ulcer colitis flare with bloody BMs as well as iron deficiency anemia  · 1 unit given 7/18, hemoglobin 7 4 -> 7 0 -> 6 2  · Mother very concerned about PRBC & IV iron d/t hemochromatosis & frustrated that underlying condition is not being taken care of  · She wants only GI to decide about iron & PRBC  · GI giving 2 U PRBC today, IV iron    Transaminitis  Assessment & Plan  · Elevated ALT, possibly due to blood loss  · MRI/ MRCP showed normal MRCP with heavy metal deposition in the liver, consistent with hemochromatosis which is likely 2/2 recent iron/blood transfusions  Iron panel not consistent with overload  · Hepatitis B and C, mitochondrial and smooth antibodies negative    · hemochromatosis mutation negative    Iron deficiency anemia  Assessment & Plan  · Continue PO iron supplementation  · Also IV iron added by GI        [facility] Progress Note  Patient: Roya Heredia 24 y o  male   MRN: [id number]  PCP: Kristopher Johnson DO  Unit/Bed#: [facility] 413-97 Encounter: [id number]  Date Of Visit: 21    Assessment:    Principal Problem:    Colitis, ulcerative ([address] )  Active Problems:    Iron deficiency anemia    Transaminitis    ABLA (acute blood loss anemia)    Severe protein-calorie malnutrition (HCC)      Plan:           VTE Pharmacologic Prophylaxis:   VTE Score: 0 Moderate Risk (Score 3-4) - Pharmacological DVT Prophylaxis Ordered: Enoxaparin (Lovenox)  Mechanical VTE Prophylaxis in Place: Yes    Patient Centered Rounds: I have performed bedside rounds with nursing staff today  Discussions with Specialists or Other Care Team Provider: GI - PRBC, cont lovenox    Education and Discussions with Family / Patient: Updated  (mother) at bedside  Time Spent for Care: 45 minutes  More than 50% of total time spent on counseling and coordination of care as described above  Current Length of Stay: 7 day(s)  Current Patient Status: Inpatient     Certification Statement: The patient will continue to require additional inpatient hospital stay due to not ready    Discharge Plan / Estimated Discharge Date: unclear    Code Status: Level 1 - Full Code      Subjective:   Feels tired, no SOB or CP    Objective:     Vitals:   Temp (24hrs), Av 4 °F (36 9 °C), Min:97 9 °F (36 6 °C), Max:99 4 °F (37 4 °C)    Temp:  [97 9 °F (36 6 °C)-99 4 °F (37 4 °C)] 98 6 °F (37 °C)  HR:  [] 87  Resp:  [18-20] 20  BP: (109-133)/(64-71) 112/65  SpO2:  [96 %-97 %] 97 %  Body mass index is 26 56 kg/m²  Input and Output Summary (last 24 hours): Intake/Output Summary (Last 24 hours) at 2021 1434  Last data filed at 2021 1319  Gross per 24 hour   Intake 1218 ml   Output 903 ml   Net 315 ml       Physical Exam:   Physical Exam  Vitals reviewed  HENT:      Head: Normocephalic and atraumatic        Mouth/Throat: Mouth: Mucous membranes are moist    Cardiovascular:      Rate and Rhythm: Normal rate and regular rhythm  Heart sounds: Normal heart sounds  Pulmonary:      Effort: Pulmonary effort is normal  No respiratory distress  Breath sounds: Normal breath sounds  No wheezing  Abdominal:      General: There is no distension  Palpations: Abdomen is soft  Tenderness: There is no abdominal tenderness  Musculoskeletal:      Right lower leg: No edema  Left lower leg: No edema  Neurological:      Mental Status: He is alert and oriented to person, place, and time  Additional Data:     Labs:  Results from last 7 days   Lab Units 07/22/21  0519 07/18/21  0610 07/17/21  0540   WBC Thousand/uL 6 69   < > 6 63   HEMOGLOBIN g/dL 6 2*  --  7 0*   HEMATOCRIT % 21 1*  --  23 6*   PLATELETS Thousands/uL 256   < > 261   BANDS PCT % 8  --   --    NEUTROS PCT %  --   --  63   LYMPHS PCT %  --   --  22   LYMPHO PCT % 26  --   --    MONOS PCT %  --   --  12   MONO PCT % 14*  --   --    EOS PCT % 2  --  1    < > = values in this interval not displayed  Results from last 7 days   Lab Units 07/17/21  0540   SODIUM mmol/L 137   POTASSIUM mmol/L 4 3   CHLORIDE mmol/L 104   CO2 mmol/L 27   BUN mg/dL 15   CREATININE mg/dL 0 51*   ANION GAP mmol/L 6   CALCIUM mg/dL 8 7   ALBUMIN g/dL 1 9*   TOTAL BILIRUBIN mg/dL 0 41   ALK PHOS U/L 71   ALT U/L 124*   AST U/L 22   GLUCOSE RANDOM mg/dL 98         Results from last 7 days   Lab Units 07/22/21  1147   POC GLUCOSE mg/dl 151*               Imaging: No pertinent imaging reviewed      Recent Cultures (last 7 days):           Lines/Drains:  Invasive Devices     Peripherally Inserted Central Catheter Line            PICC Line 07/35/42 Right Basilic <1 day          Peripheral Intravenous Line            Peripheral IV 07/21/21 Left Antecubital 1 day                Telemetry:        Last 24 Hours Medication List:   Current Facility-Administered Medications   Medication Dose Route Frequency Provider Last Rate    acetaminophen  650 mg Oral Q4H PRN Laurel Vila MD      Adult TPN (CUSTOM BASE/CUSTOM ELECTROLYTE)   Intravenous Continuous TPN Jessika Lima MD      Adult TPN (STANDARD BASE/STANDARD ELECTROLYTE)   Intravenous Continuous TPN Zurdo De La Garza MD 42 mL/hr at 07/21/21 2111    dextrose 5 % and sodium chloride 0 45 % with KCl 20 mEq/L  75 mL/hr Intravenous Continuous Zurdo De La Garza MD 75 mL/hr (07/22/21 0435)    enoxaparin  40 mg Subcutaneous Q24H [address] Puma Zambrano MD      insulin lispro  1-6 Units Subcutaneous Q6H [address] Aspen Hamlin PA-C      iron sucrose  300 mg Intravenous Every Other Day Nisreen Pandey DO      methylPREDNISolone sodium succinate  20 mg Intravenous [facility] Laurel Vila MD      ondansetron  4 mg Intravenous Q4H PRN Laurel Vila MD      pantoprazole  40 mg Oral Early Morning Laurel Vila MD      saccharomyces boulardii  250 mg Oral BID Laurel Vila MD          Today, Patient Was Seen By: Jessika Lima MD    ** Please Note: This note has been constructed using a voice recognition system   **

## 2021-07-22 NOTE — ASSESSMENT & PLAN NOTE
· Acute blood loss anemia in the setting of ulcer colitis flare with bloody BMs as well as iron deficiency anemia  · 1 unit given 7/18, hemoglobin 7 4 -> 7 0 -> 6 2  · Mother very concerned about PRBC & IV iron d/t hemochromatosis & frustrated that underlying condition is not being taken care of  · She wants only GI to decide about iron & PRBC  · GI giving 2 U PRBC today, IV iron

## 2021-07-22 NOTE — PROGRESS NOTES
Progress Note- Benji Antonio 24 y o  male MRN: [id number]    Unit/Bed#: [facility] 816-01 Encounter: [id number]      Assessment and Plan:    Ulcerative colitis refractory to steroids  · Third dose of Remicade yesterday  · Continue IVF, Protonix, methylprednisolone  · Monitor electrolytes  · Pending CMV, sigmoidoscopy biopsies    Iron deficiency anemia  · Start Venofer 300 mg three times a week  · Transfuse 1 unit PRBC  · Hg 6 2 today, 7 yesterday  · Monitor Hg    Protein calorie malnutrition  · TPN started yesterday  · Continue low fiber low residue diet, and dietary supplements        ______________________________________________________________________    Subjective:     Received Remicade yesterday  Had shortness of breath and flushing during transfusion that resolved after receiving Tylenol and Benadryl  Pt states he feels better than yesterday  Mother at bedside and states things are unchanged from yesterday  Still having bloody bowel movements and pressure-like abdominal pain        Medication Administration - last 24 hours from 07/21/2021 0858 to 07/22/2021 0858       Date/Time Order Dose Route Action Action by     07/22/2021 0519 pantoprazole (PROTONIX) EC tablet 40 mg 40 mg Oral Given Jaylin Pitt RN     07/21/2021 1818 saccharomyces boulardii (FLORASTOR) capsule 250 mg 250 mg Oral Given Lady Verna RN     07/22/2021 9904 methylPREDNISolone sodium succinate (Solu-MEDROL) injection 20 mg 20 mg Intravenous Given Jaylin Pitt RN     07/21/2021 2111 methylPREDNISolone sodium succinate (Solu-MEDROL) injection 20 mg 20 mg Intravenous Given Jaylin Pitt RN     07/21/2021 1349 methylPREDNISolone sodium succinate (Solu-MEDROL) injection 20 mg 20 mg Intravenous Given Lady Verna RN     07/21/2021 2106 inFLIXimab (REMICADE) 1,000 mg in sodium chloride 0 9 % 150 mL IVPB 0 mg Intravenous Stopped Jaylin Pitt RN     07/21/2021 1546 inFLIXimab (REMICADE) 1,000 mg in sodium chloride 0 9 % 150 mL IVPB 1,000 mg Intravenous [address] [place], [employer]     07/21/2021 1613 acetaminophen (TYLENOL) tablet 650 mg 650 mg Oral Given Roya Celeste RN     07/21/2021 1613 diphenhydrAMINE (BENADRYL) tablet 25 mg 25 mg Oral Given Roya Celeste RN     07/21/2021 1225 enoxaparin (LOVENOX) subcutaneous injection 40 mg 40 mg Subcutaneous Refused Roya Celeste RN     07/21/2021 2111 Adult 3-in-1 TPN (standard base / standard electrolytes)   Intravenous [address] Kelly Villaseñor RN     07/22/2021 0435 dextrose 5 % and sodium chloride 0 45 % with KCl 20 mEq/L infusion 75 mL/hr Intravenous [address] [facility] [employer]     07/22/2021 0433 dextrose 5 % and sodium chloride 0 45 % with KCl 20 mEq/L infusion 0 mL/hr Intravenous Stopped Loraine Soto RN     07/21/2021 1513 dextrose 5 % and sodium chloride 0 45 % with KCl 20 mEq/L infusion 75 mL/hr Intravenous New Bag Roya Celeste RN          Objective:     Vitals: Blood pressure 116/64, pulse 83, temperature 98 °F (36 7 °C), resp  rate 20, height 6' 3" (1 905 m), weight 96 4 kg (212 lb 8 4 oz), SpO2 97 %  ,Body mass index is 26 56 kg/m²        Intake/Output Summary (Last 24 hours) at 7/22/2021 0858  Last data filed at 7/22/2021 0440  Gross per 24 hour   Intake 1000 ml   Output 403 ml   Net 597 ml       Physical Exam:   General Appearance: Awake and alert, in no acute distress  Abdomen: Tender to LLQ, Soft, non-distended; bowel sounds normal; no masses or no organomegaly    Invasive Devices     Peripherally Inserted Central Catheter Line            PICC Line 45/07/80 Right Basilic <1 day          Peripheral Intravenous Line            Peripheral IV 07/21/21 Left Antecubital 1 day                Lab Results:  Admission on 07/15/2021   Component Date Value    WBC 07/16/2021 6 94     RBC 07/16/2021 3 05*    Hemoglobin 07/16/2021 7 5*    Hematocrit 07/16/2021 25 5*    MCV 07/16/2021 84     MCH 07/16/2021 24 6*    MCHC 07/16/2021 29 4*    RDW 07/16/2021 18 7*    Platelets 79/20/6698 256     MPV 07/16/2021 11 2     Sodium 07/16/2021 137     Potassium 07/16/2021 4 3     Chloride 07/16/2021 103     CO2 07/16/2021 28     ANION GAP 07/16/2021 6     BUN 07/16/2021 14     Creatinine 07/16/2021 0 49*    Glucose 07/16/2021 107     Calcium 07/16/2021 8 9     eGFR 07/16/2021 156     WBC 07/17/2021 6 63     RBC 07/17/2021 2 84*    Hemoglobin 07/17/2021 7 0*    Hematocrit 07/17/2021 23 6*    MCV 07/17/2021 83     MCH 07/17/2021 24 6*    MCHC 07/17/2021 29 7*    RDW 07/17/2021 18 9*    MPV 07/17/2021 11 1     Platelets 99/44/4096 261     nRBC 07/17/2021 1     Neutrophils Relative 07/17/2021 63     Immat GRANS % 07/17/2021 2     Lymphocytes Relative 07/17/2021 22     Monocytes Relative 07/17/2021 12     Eosinophils Relative 07/17/2021 1     Basophils Relative 07/17/2021 0     Neutrophils Absolute 07/17/2021 4 22     Immature Grans Absolute 07/17/2021 0 10     Lymphocytes Absolute 07/17/2021 1 47     Monocytes Absolute 07/17/2021 0 77     Eosinophils Absolute 07/17/2021 0 05     Basophils Absolute 07/17/2021 0 02     Sodium 07/17/2021 137     Potassium 07/17/2021 4 3     Chloride 07/17/2021 104     CO2 07/17/2021 27     ANION GAP 07/17/2021 6     BUN 07/17/2021 15     Creatinine 07/17/2021 0 51*    Glucose 07/17/2021 98     Calcium 07/17/2021 8 7     Corrected Calcium 07/17/2021 10 4*    AST 07/17/2021 22     ALT 07/17/2021 124*    Alkaline Phosphatase 07/17/2021 71     Total Protein 07/17/2021 6 2*    Albumin 07/17/2021 1 9*    Total Bilirubin 07/17/2021 0 41     eGFR 07/17/2021 154     Hemoglobin 07/18/2021 6 9*    Hematocrit 07/18/2021 23 2*    ABO Grouping 07/18/2021 O     Rh Factor 07/18/2021 Positive     Antibody Screen 07/18/2021 Negative     Specimen Expiration Date 07/18/2021 [id number]     Unit Product Code 07/19/2021 [id number]     Unit Number 07/19/2021 [id number]     Unit ABO 07/19/2021 Celester Ruts     Unit RH 07/19/2021 NEG     Crossmatch 07/19/2021 Compatible     Unit Dispense Status 07/19/2021 Return to Inv     Unit Product Code 07/19/2021 T1200O91     Unit Number 07/19/2021 [id number]     Unit ABO 07/19/2021 O     Unit RH 07/19/2021 NEG     Crossmatch 07/19/2021 Compatible     Unit Dispense Status 07/19/2021 Presumed Trans     WBC 07/18/2021 6 84     RBC 07/18/2021 3 04*    Hemoglobin 07/18/2021 7 8*    Hematocrit 07/18/2021 25 8*    MCV 07/18/2021 85     MCH 07/18/2021 25 7*    MCHC 07/18/2021 30 2*    RDW 07/18/2021 19 5*    Platelets 81/22/8461 274     MPV 07/18/2021 10 7     Hemoglobin 07/19/2021 7 5*    Hematocrit 07/19/2021 25 0*    Hemoglobin 07/20/2021 7 4*    Hematocrit 07/20/2021 24 9*    CRP 07/20/2021 75 8*    Hemoglobin 07/21/2021 7 0*    Hematocrit 07/21/2021 23 8*       Imaging Studies: I have personally reviewed pertinent imaging studies

## 2021-07-22 NOTE — ASSESSMENT & PLAN NOTE
· Patient was recently diagnosed with ulcerative colitis, was being treated with mesalamine and Remicade as outpatient  Received 2 doses without much improvement  Presented with syncopal episode  · CT abdomen pelvis showed worsening colitis  · GI following, on intravenous steroids and PPI  · s/p flex sig on 7/20 - severe colitis  Biopsies taken to rule out CMV  · Repeat CRP, remains unchanged  · IV remicaid infusion to be given per GI - doesn't have bed on Hem/onc floor, to be given by nurse on current floor  But pt developed SOB within 5 min of start of infusion, received tylenol & benadryl after that, infusion stopped    Better after  · Mother wants only GI to decide whether remicaid can be restarted or not  · Colorectal surgery following - plan is to start TPN, PICC placement  · Follows with Dr Cheri Monzon as outpatient

## 2021-07-22 NOTE — MALNUTRITION/BMI
This medical record reflects one or more clinical indicators suggestive of malnutrition     Malnutrition Findings:   Adult Malnutrition type: Acute illness (Related to Ulcerative colitis as evidenced by 10% weight loss over the past month and <50% energy intake needs met >5 days treated with TPN )  Adult Degree of Malnutrition: Other severe protein calorie malnutrition  Malnutrition Characteristics: Inadequate energy, Weight loss        See Nutrition note dated 7/22/21 for additional details  Completed nutrition assessment is viewable in the nutrition documentation

## 2021-07-22 NOTE — ASSESSMENT & PLAN NOTE
· Elevated ALT, possibly due to blood loss  · MRI/ MRCP showed normal MRCP with heavy metal deposition in the liver, consistent with hemochromatosis which is likely 2/2 recent iron/blood transfusions  Iron panel not consistent with overload  · Hepatitis B and C, mitochondrial and smooth antibodies negative    · hemochromatosis mutation negative

## 2021-07-22 NOTE — PROGRESS NOTES
Progress Note - Colorectal Surgery  Yasmin Roach 24 y o  male MRN: [id number]  Unit/Bed#: [facility] 816-01 Encounter: [id number]    Assessment:  24 y o  male with UC with poor response to medical management  He is currently being managed on steroids and Infliximab  Plan:  - Diet GI; Lo Fiber/Lo Residue  Adult 3-in-1 TPN (standard base / standard electrolytes)  - Pain and Nausea control PRN  - OOB, ambulate  - Continue medical management per GI  - f/u CMV biopsy  - recommend avoiding transfusion unless symptomatic  - Trend CBC    Subjective/Objective     Subjective: The patients pain is well controlled  He continue to see blood mixed in with his stool  He received his Remicade yesterday and was able to complete the dose after getting tylenol and benadryl  Objective:   Vitals: Blood pressure 127/69, pulse 101, temperature 99 4 °F (37 4 °C), resp  rate 18, height 6' 3" (1 905 m), weight 96 4 kg (212 lb 8 4 oz), SpO2 97 %  ,Body mass index is 26 56 kg/m²  I/O       07/20 0701 - 07/21 0700 07/21 0701 - 07/22 0700    P  O  220 120    I V  (mL/kg) 300 (3 1)     Total Intake(mL/kg) 520 (5 4) 120 (1 2)    Urine (mL/kg/hr) 0 (0)     Total Output 0     Net +520 +120          Unmeasured Urine Occurrence 1 x           Physical Exam:  Gen: NAD, Aox3, Comfortable in bed  Chest: Normal work of breathing, no respiratory distress  Abd: Soft, ND, RLQ minimally tender  Ext: No Edema  Skin: Warm, Dry, Intact      Lab, Imaging and other studies:   I have personally reviewed pertinent reports    , CBC with diff:   Lab Results   Component Value Date    HGB 7 0 (L) 07/21/2021    HCT 23 8 (L) 07/21/2021   , BMP/CMP: No results found for: SODIUM, K, CL, CO2, ANIONGAP, BUN, CREATININE, GLUCOSE, CALCIUM, AST, ALT, ALKPHOS, PROT, BILITOT, EGFR  VTE Pharmacologic Prophylaxis: Enoxaparin (Lovenox)  VTE Mechanical Prophylaxis: sequential compression device        Anselmo Rubinstein, MD  7/22/2021  5:43 AM

## 2021-07-22 NOTE — ASSESSMENT & PLAN NOTE
· Acute blood loss anemia in the setting of ulcer colitis flare as well as iron deficiency anemia  · 1 unit given 7/18, hemoglobin 7 4 -> 7 0   · Mother very concerned about PRBC & IV iron d/t hemochromatosis & frustrated that underlying condition is not being taken care of  · She wants only GI to decide about iron & PRBC

## 2021-07-22 NOTE — NUTRITION
07/22/21 7259   Recommendations/Interventions   Summary Patient's appetite remains poor  Patient reports tolerating breakfast (eggs/toast/banana/juice) and consuming Ensure enlive TID  Patient reports poor po intake with lunch and dinner meals  Significant weight loss noted  TPN initiated  Per RN, GI MD reports patient to be made strict NPO for bowel rest and TPN to provide 100% nutritional needs  Interventions PN change admixture   Nutrition Recommendations Adjust EN/PN;Lab consider order (Specify); Other (Specify)  (Monitor electrolytes for refeeding syndrome, once stable suggest increase TPN to provide 100% nutritional needs  Rec: 900 ml 15% aa, 1400 ml 30% dex, 425 ml 20% lipids (2818 kcal, 135 grams protein, 2725 ml tv)   Obtain biweekly standing scale weight )

## 2021-07-22 NOTE — PLAN OF CARE
Problem: PAIN - ADULT  Goal: Verbalizes/displays adequate comfort level or baseline comfort level  Description: Interventions:  - Encourage patient to monitor pain and request assistance  - Assess pain using appropriate pain scale  - Administer analgesics based on type and severity of pain and evaluate response  - Implement non-pharmacological measures as appropriate and evaluate response  - Consider cultural and social influences on pain and pain management  - Notify physician/advanced practitioner if interventions unsuccessful or patient reports new pain  Outcome: Progressing     Problem: DISCHARGE PLANNING  Goal: Discharge to home or other facility with appropriate resources  Description: INTERVENTIONS:  - Identify barriers to discharge w/patient and caregiver  - Arrange for needed discharge resources and transportation as appropriate  - Identify discharge learning needs (meds, wound care, etc )  - Arrange for interpretive services to assist at discharge as needed  - Refer to Case Management Department for coordinating discharge planning if the patient needs post-hospital services based on physician/advanced practitioner order or complex needs related to functional status, cognitive ability, or social support system  Outcome: Progressing     Problem: Knowledge Deficit  Goal: Patient/family/caregiver demonstrates understanding of disease process, treatment plan, medications, and discharge instructions  Description: Complete learning assessment and assess knowledge base    Interventions:  - Provide teaching at level of understanding  - Provide teaching via preferred learning methods  Outcome: Progressing     Problem: Potential for Falls  Goal: Patient will remain free of falls  Description: INTERVENTIONS:  - Educate patient/family on patient safety including physical limitations  - Instruct patient to call for assistance with activity   - Consult OT/PT to assist with strengthening/mobility   - Keep Call bell within reach  - Keep bed low and locked with side rails adjusted as appropriate  - Keep care items and personal belongings within reach  - Initiate and maintain comfort rounds  - Make Fall Risk Sign visible to staff  - Apply yellow socks and bracelet for high fall risk patients  - Consider moving patient to room near nurses station  Outcome: Progressing     Problem: METABOLIC, FLUID AND ELECTROLYTES - ADULT  Goal: Electrolytes maintained within normal limits  Description: INTERVENTIONS:  - Monitor labs and assess patient for signs and symptoms of electrolyte imbalances  - Administer electrolyte replacement as ordered  - Monitor response to electrolyte replacements, including repeat lab results as appropriate  - Instruct patient on fluid and nutrition as appropriate  Outcome: Progressing     Problem: HEMATOLOGIC - ADULT  Goal: Maintains hematologic stability  Description: INTERVENTIONS  - Assess for signs and symptoms of bleeding or hemorrhage  - Monitor labs  - Administer supportive blood products/factors as ordered and appropriate  Outcome: Progressing

## 2021-07-22 NOTE — ASSESSMENT & PLAN NOTE
Malnutrition Findings:   Adult Malnutrition type: Acute illness (Related to Ulcerative colitis as evidenced by 10% weight loss over the past month and <50% energy intake needs met >5 days treated with TPN )  Adult Degree of Malnutrition: Other severe protein calorie malnutrition    BMI Findings: Body mass index is 26 56 kg/m²

## 2021-07-22 NOTE — PROGRESS NOTES
[facility]  Progress Note - Dylan Miller 2000, 24 y o  male MRN: [id number]  [facility] Encounter: [id number]  Primary Care Provider: Sarah Cueva DO   Date and time admitted to hospital: 7/15/2021  9:24 PM    ABLA (acute blood loss anemia)  Assessment & Plan  · Acute blood loss anemia in the setting of ulcer colitis flare as well as iron deficiency anemia  · 1 unit given 7/18, hemoglobin 7 4 -> 7 0   · Mother very concerned about PRBC & IV iron d/t hemochromatosis & frustrated that underlying condition is not being taken care of  · She wants only GI to decide about iron & PRBC    Transaminitis  Assessment & Plan  · Elevated ALT, possibly due to blood loss  · MRI/ MRCP showed normal MRCP with heavy metal deposition in the liver, consistent with hemochromatosis which is likely 2/2 recent iron/blood transfusions  Iron panel not consistent with overload  · Hepatitis B and C, mitochondrial and smooth antibodies negative  · hemochromatosis mutation negative    Iron deficiency anemia  Assessment & Plan  · Continue PO iron supplementation    * Colitis, ulcerative ([facility] [address] )  Assessment & Plan  · Patient was recently diagnosed with ulcerative colitis, was being treated with mesalamine and Remicade as outpatient  Received 2 doses without much improvement  Presented with syncopal episode  · CT abdomen pelvis showed worsening colitis  · GI following, on intravenous steroids and PPI  · s/p flex sig on 7/20 - severe colitis  Biopsies taken to rule out CMV  · Repeat CRP, remains unchanged  · IV remicaid infusion to be given per GI - doesn't have bed on Hem/onc floor, to be given by nurse on current floor  But pt developed SOB within 5 min of start of infusion, received tylenol & benadryl after that, infusion stopped    Better after  · Mother wants only GI to decide whether remicaid can be restarted or not  · Colorectal surgery following - plan is to start TPN, PICC placement  · Follows with Dr Missael Arzate as outpatient        [facility] Internal Medicine Progress Note  Patient: Alexandria Tran 24 y o  male   MRN: [id number]  PCP: Shira Almeida DO  Unit/Bed#: [facility] 143-52 Encounter: [id number]  Date Of Visit: 21    Assessment:    Principal Problem:    Colitis, ulcerative ([address] )  Active Problems:    Iron deficiency anemia    Transaminitis    ABLA (acute blood loss anemia)      Plan:         VTE Pharmacologic Prophylaxis:   VTE Score: 0 Moderate Risk (Score 3-4) - Pharmacological DVT Prophylaxis Ordered: Enoxaparin (Lovenox)  Mechanical VTE Prophylaxis in Place: Yes    Patient Centered Rounds: I have performed bedside rounds with nursing staff today  Discussions with Specialists or Other Care Team Provider: d/w GI & Colorectal - PICC line, TPN, remicaid    Education and Discussions with Family / Patient: Updated  (mother) at bedside  Time Spent for Care: 30 minutes  More than 50% of total time spent on counseling and coordination of care as described above  Current Length of Stay: 6 day(s)  Current Patient Status: Inpatient     Certification Statement: The patient will continue to require additional inpatient hospital stay due to UC not controlled    Discharge Plan / Estimated Discharge Date: unclear    Code Status: Level 1 - Full Code      Subjective:   Feels tired, dizzy on rapid standing    Objective:     Vitals:   Temp (24hrs), Av 5 °F (36 9 °C), Min:98 2 °F (36 8 °C), Max:98 6 °F (37 °C)    Temp:  [98 2 °F (36 8 °C)-98 6 °F (37 °C)] 98 6 °F (37 °C)  HR:  [] 99  Resp:  [18-20] 18  BP: (109-133)/(63-71) 133/71  SpO2:  [95 %-97 %] 97 %  Body mass index is 26 56 kg/m²  Input and Output Summary (last 24 hours): Intake/Output Summary (Last 24 hours) at 2021  Last data filed at 2021 0810  Gross per 24 hour   Intake 120 ml   Output 0 ml   Net 120 ml       Physical Exam:   Physical Exam  Vitals reviewed     HENT: Head: Normocephalic and atraumatic  Cardiovascular:      Rate and Rhythm: Normal rate and regular rhythm  Heart sounds: Normal heart sounds  Pulmonary:      Effort: Pulmonary effort is normal  No respiratory distress  Breath sounds: Normal breath sounds  Abdominal:      General: There is no distension  Palpations: Abdomen is soft  Tenderness: There is no abdominal tenderness  Neurological:      Mental Status: He is alert and oriented to person, place, and time  Additional Data:     Labs:  Results from last 7 days   Lab Units 07/21/21  0559 07/18/21  1515 07/17/21  0540   WBC Thousand/uL  --  6 84 6 63   HEMOGLOBIN g/dL 7 0* 7 8* 7 0*   HEMATOCRIT % 23 8* 25 8* 23 6*   PLATELETS Thousands/uL  --  274 261   NEUTROS PCT %  --   --  63   LYMPHS PCT %  --   --  22   MONOS PCT %  --   --  12   EOS PCT %  --   --  1     Results from last 7 days   Lab Units 07/17/21  0540   SODIUM mmol/L 137   POTASSIUM mmol/L 4 3   CHLORIDE mmol/L 104   CO2 mmol/L 27   BUN mg/dL 15   CREATININE mg/dL 0 51*   ANION GAP mmol/L 6   CALCIUM mg/dL 8 7   ALBUMIN g/dL 1 9*   TOTAL BILIRUBIN mg/dL 0 41   ALK PHOS U/L 71   ALT U/L 124*   AST U/L 22   GLUCOSE RANDOM mg/dL 98                       Imaging: No pertinent imaging reviewed      Recent Cultures (last 7 days):           Lines/Drains:  Invasive Devices     Peripherally Inserted Central Catheter Line            PICC Line 31/00/49 Right Basilic <1 day          Peripheral Intravenous Line            Peripheral IV 07/21/21 Left Antecubital <1 day                Telemetry:        Last 24 Hours Medication List:   Current Facility-Administered Medications   Medication Dose Route Frequency Provider Last Rate    acetaminophen  650 mg Oral Q4H PRN Kellie Javier MD      Adult TPN (STANDARD BASE/STANDARD ELECTROLYTE)   Intravenous Continuous TPN Nikolay Henriquez MD 42 mL/hr at 07/21/21 2111    dextrose 5 % and sodium chloride 0 45 % with KCl 20 mEq/L  75 mL/hr Intravenous Continuous Pattie Ivey MD 75 mL/hr (07/21/21 1513)    enoxaparin  40 mg Subcutaneous Q24H [facility] Puma Zambrano MD      methylPREDNISolone sodium succinate  20 mg Intravenous Q8H [facility] Kenneth Campos MD      ondansetron  4 mg Intravenous Q4H PRN Kenneth Campos MD      pantoprazole  40 mg Oral Early Morning Kenneth Campos MD      saccharomyces boulardii  250 mg Oral BID Kenneth Campos MD          Today, Patient Was Seen By: Sachin Candelario MD    ** Please Note: This note has been constructed using a voice recognition system   **

## 2021-07-23 LAB
ABO GROUP BLD BPU: NORMAL
ALBUMIN SERPL BCP-MCNC: 1.9 G/DL (ref 3.5–5)
ALBUMIN SERPL BCP-MCNC: 1.9 G/DL (ref 3.5–5)
ALP SERPL-CCNC: 60 U/L (ref 46–116)
ALT SERPL W P-5'-P-CCNC: 69 U/L (ref 12–78)
ALT SERPL W P-5'-P-CCNC: 69 U/L (ref 12–78)
ANION GAP SERPL CALCULATED.3IONS-SCNC: 6 MMOL/L (ref 4–13)
ANION GAP SERPL CALCULATED.3IONS-SCNC: 6 MMOL/L (ref 4–13)
AST SERPL W P-5'-P-CCNC: 15 U/L (ref 5–45)
AST SERPL W P-5'-P-CCNC: 15 U/L (ref 5–45)
BILIRUB DIRECT SERPL-MCNC: 0.09 MG/DL (ref 0–0.2)
BILIRUB SERPL-MCNC: 0.33 MG/DL (ref 0.2–1)
BILIRUB SERPL-MCNC: 0.33 MG/DL (ref 0.2–1)
BPU ID: NORMAL
BUN SERPL-MCNC: 10 MG/DL (ref 5–25)
BUN SERPL-MCNC: 10 MG/DL (ref 5–25)
CALCIUM ALBUM COR SERPL-MCNC: 10 MG/DL (ref 8.3–10.1)
CALCIUM SERPL-MCNC: 8.3 MG/DL (ref 8.3–10.1)
CALCIUM SERPL-MCNC: 8.3 MG/DL (ref 8.3–10.1)
CHLORIDE SERPL-SCNC: 104 MMOL/L (ref 100–108)
CHLORIDE SERPL-SCNC: 104 MMOL/L (ref 100–108)
CO2 SERPL-SCNC: 28 MMOL/L (ref 21–32)
CO2 SERPL-SCNC: 28 MMOL/L (ref 21–32)
CREAT SERPL-MCNC: 0.43 MG/DL (ref 0.6–1.3)
CREAT SERPL-MCNC: 0.43 MG/DL (ref 0.6–1.3)
CROSSMATCH: NORMAL
ERYTHROCYTE [DISTWIDTH] IN BLOOD BY AUTOMATED COUNT: 19.4 % (ref 11.6–15.1)
GFR SERPL CREATININE-BSD FRML MDRD: 165 ML/MIN/1.73SQ M
GFR SERPL CREATININE-BSD FRML MDRD: 165 ML/MIN/1.73SQ M
GLUCOSE SERPL-MCNC: 117 MG/DL (ref 65–140)
GLUCOSE SERPL-MCNC: 140 MG/DL (ref 65–140)
GLUCOSE SERPL-MCNC: 151 MG/DL (ref 65–140)
GLUCOSE SERPL-MCNC: 151 MG/DL (ref 65–140)
GLUCOSE SERPL-MCNC: 165 MG/DL (ref 65–140)
HCT VFR BLD AUTO: 23.9 % (ref 36.5–49.3)
HGB BLD-MCNC: 7 G/DL (ref 12–17)
LDH SERPL-CCNC: 116 U/L (ref 81–234)
MAGNESIUM SERPL-MCNC: 2.4 MG/DL (ref 1.6–2.6)
MCH RBC QN AUTO: 25.6 PG (ref 26.8–34.3)
MCHC RBC AUTO-ENTMCNC: 29.3 G/DL (ref 31.4–37.4)
MCV RBC AUTO: 88 FL (ref 82–98)
NRBC BLD AUTO-RTO: 1 /100 WBCS
PHOSPHATE SERPL-MCNC: 3 MG/DL (ref 2.7–4.5)
PLATELET # BLD AUTO: 261 THOUSANDS/UL (ref 149–390)
PMV BLD AUTO: 11.1 FL (ref 8.9–12.7)
POTASSIUM SERPL-SCNC: 3.6 MMOL/L (ref 3.5–5.3)
POTASSIUM SERPL-SCNC: 3.6 MMOL/L (ref 3.5–5.3)
PREALB SERPL-MCNC: 18.9 MG/DL (ref 18–40)
PROT SERPL-MCNC: 5.5 G/DL (ref 6.4–8.2)
PROT SERPL-MCNC: 5.6 G/DL (ref 6.4–8.2)
RBC # BLD AUTO: 2.73 MILLION/UL (ref 3.88–5.62)
SODIUM SERPL-SCNC: 138 MMOL/L (ref 136–145)
SODIUM SERPL-SCNC: 138 MMOL/L (ref 136–145)
TRIGL SERPL-MCNC: 150 MG/DL
UNIT DISPENSE STATUS: NORMAL
UNIT PRODUCT CODE: NORMAL
UNIT RH: NORMAL
WBC # BLD AUTO: 6.85 THOUSAND/UL (ref 4.31–10.16)

## 2021-07-23 PROCEDURE — 84100 ASSAY OF PHOSPHORUS: CPT | Performed by: HOSPITALIST

## 2021-07-23 PROCEDURE — 83615 LACTATE (LD) (LDH) ENZYME: CPT | Performed by: HOSPITALIST

## 2021-07-23 PROCEDURE — 82040 ASSAY OF SERUM ALBUMIN: CPT | Performed by: HOSPITALIST

## 2021-07-23 PROCEDURE — 84450 TRANSFERASE (AST) (SGOT): CPT | Performed by: HOSPITALIST

## 2021-07-23 PROCEDURE — 80053 COMPREHEN METABOLIC PANEL: CPT | Performed by: HOSPITALIST

## 2021-07-23 PROCEDURE — 84460 ALANINE AMINO (ALT) (SGPT): CPT | Performed by: HOSPITALIST

## 2021-07-23 PROCEDURE — 80048 BASIC METABOLIC PNL TOTAL CA: CPT | Performed by: HOSPITALIST

## 2021-07-23 PROCEDURE — 82948 REAGENT STRIP/BLOOD GLUCOSE: CPT

## 2021-07-23 PROCEDURE — 99232 SBSQ HOSP IP/OBS MODERATE 35: CPT | Performed by: INTERNAL MEDICINE

## 2021-07-23 PROCEDURE — 82247 BILIRUBIN TOTAL: CPT | Performed by: HOSPITALIST

## 2021-07-23 PROCEDURE — 83735 ASSAY OF MAGNESIUM: CPT | Performed by: HOSPITALIST

## 2021-07-23 PROCEDURE — 87493 C DIFF AMPLIFIED PROBE: CPT | Performed by: PHYSICIAN ASSISTANT

## 2021-07-23 PROCEDURE — 85027 COMPLETE CBC AUTOMATED: CPT | Performed by: HOSPITALIST

## 2021-07-23 PROCEDURE — 82248 BILIRUBIN DIRECT: CPT | Performed by: HOSPITALIST

## 2021-07-23 PROCEDURE — 84134 ASSAY OF PREALBUMIN: CPT | Performed by: SURGERY

## 2021-07-23 PROCEDURE — P9016 RBC LEUKOCYTES REDUCED: HCPCS

## 2021-07-23 PROCEDURE — 84478 ASSAY OF TRIGLYCERIDES: CPT | Performed by: HOSPITALIST

## 2021-07-23 PROCEDURE — 99232 SBSQ HOSP IP/OBS MODERATE 35: CPT | Performed by: HOSPITALIST

## 2021-07-23 PROCEDURE — 84155 ASSAY OF PROTEIN SERUM: CPT | Performed by: HOSPITALIST

## 2021-07-23 PROCEDURE — 99233 SBSQ HOSP IP/OBS HIGH 50: CPT | Performed by: COLON & RECTAL SURGERY

## 2021-07-23 RX ADMIN — INSULIN LISPRO 1 UNITS: 100 INJECTION, SOLUTION INTRAVENOUS; SUBCUTANEOUS at 06:37

## 2021-07-23 RX ADMIN — PANTOPRAZOLE SODIUM 40 MG: 40 TABLET, DELAYED RELEASE ORAL at 06:09

## 2021-07-23 RX ADMIN — Medication 250 MG: at 17:55

## 2021-07-23 RX ADMIN — DEXTROSE, SODIUM CHLORIDE, AND POTASSIUM CHLORIDE 75 ML/HR: 5; .45; .15 INJECTION INTRAVENOUS at 20:03

## 2021-07-23 RX ADMIN — METHYLPREDNISOLONE SODIUM SUCCINATE 20 MG: 40 INJECTION, POWDER, FOR SOLUTION INTRAMUSCULAR; INTRAVENOUS at 06:31

## 2021-07-23 RX ADMIN — ENOXAPARIN SODIUM 40 MG: 40 INJECTION SUBCUTANEOUS at 09:22

## 2021-07-23 RX ADMIN — Medication 250 MG: at 09:22

## 2021-07-23 RX ADMIN — METHYLPREDNISOLONE SODIUM SUCCINATE 20 MG: 40 INJECTION, POWDER, FOR SOLUTION INTRAMUSCULAR; INTRAVENOUS at 22:11

## 2021-07-23 RX ADMIN — THIAMINE HYDROCHLORIDE: 100 INJECTION, SOLUTION INTRAMUSCULAR; INTRAVENOUS at 22:11

## 2021-07-23 RX ADMIN — METHYLPREDNISOLONE SODIUM SUCCINATE 20 MG: 40 INJECTION, POWDER, FOR SOLUTION INTRAMUSCULAR; INTRAVENOUS at 14:27

## 2021-07-23 RX ADMIN — DEXTROSE, SODIUM CHLORIDE, AND POTASSIUM CHLORIDE 75 ML/HR: 5; .45; .15 INJECTION INTRAVENOUS at 03:34

## 2021-07-23 RX ADMIN — PANTOPRAZOLE SODIUM 40 MG: 40 TABLET, DELAYED RELEASE ORAL at 06:31

## 2021-07-23 RX ADMIN — INSULIN LISPRO 1 UNITS: 100 INJECTION, SOLUTION INTRAVENOUS; SUBCUTANEOUS at 00:31

## 2021-07-23 NOTE — PROGRESS NOTES
[address] [facility]  Progress Note - Freda Osullivan 2000, 24 y o  male MRN: [id number]  Unit/Bed#: [facility] 816-01 Encounter: [id number]  Primary Care Provider: Piedad Santos,    Date and time admitted to hospital: 7/15/2021  9:24 PM    * Colitis, ulcerative ([facility] 75 )  Assessment & Plan  · Patient was recently diagnosed with ulcerative colitis, was being treated with mesalamine and Remicade as outpatient  Received 2 doses without much improvement  Presented with syncopal episode  · CT abdomen pelvis showed worsening colitis  · GI following, on intravenous steroids and PPI  · s/p flex sig on 7/20 - severe colitis  Biopsies taken to rule out CMV  · Repeat CRP, remains unchanged  · remicaid infusion (higher dose) 10 mg/kg given on 7/21  · Colorectal surgery following - not a candidate for surgery at present given acute inflammation  · Per GI - keep NPO for bowel rest &give TPN  · Nutrition recs appreciated for TPN  · Plan is monitor response to remicaid, cont bowel rest & reassess in next couple days - if not improving may need surgery  · Follows with Dr Roslyn Lake as outpatient  · Given the high risk of DVT/PE in acute UC flare, GI recommends to continue lovenox, d/w patient he agrees    Severe protein-calorie malnutrition ([facility] 75 )  Assessment & Plan  Malnutrition Findings:   Adult Malnutrition type: Acute illness (Related to Ulcerative colitis as evidenced by 10% weight loss over the past month and <50% energy intake needs met >5 days treated with TPN )  Adult Degree of Malnutrition: Other severe protein calorie malnutrition    BMI Findings: Body mass index is 26 56 kg/m²         ABLA (acute blood loss anemia)  Assessment & Plan  · Acute blood loss anemia in the setting of ulcer colitis flare with bloody BMs as well as iron deficiency anemia  · 1 unit given 7/18, hemoglobin upto 7 8 then downtrending  · Mother very concerned about PRBC & IV iron d/t hemochromatosis & frustrated that underlying condition is not being taken care of  · She wants only GI to decide about iron & PRBC  · 2nd PRBC on 7/22 - hb 6 2 -> 7 0, will give another 1 unit today    Transaminitis  Assessment & Plan  · Elevated ALT, possibly due to blood loss  · MRI/ MRCP showed normal MRCP with heavy metal deposition in the liver, consistent with hemochromatosis which is likely 2/2 recent iron/blood transfusions  Iron panel not consistent with overload  · Hepatitis B and C, mitochondrial and smooth antibodies negative  · hemochromatosis mutation negative    Iron deficiency anemia  Assessment & Plan  · Continue PO iron supplementation  · Also IV iron added by GI      [facility] Progress Note  Patient: Modesto Muñoz 24 y o  male   MRN: [id number]  PCP: Keysha Simmons DO  [facility] Encounter: [id number]  Date Of Visit: 07/23/21    Assessment:    Principal Problem:    Colitis, ulcerative ([facility] )  Active Problems:    Iron deficiency anemia    Transaminitis    ABLA (acute blood loss anemia)    Severe protein-calorie malnutrition ([facility] )      Plan:         VTE Pharmacologic Prophylaxis:   VTE Score: 0 Moderate Risk (Score 3-4) - Pharmacological DVT Prophylaxis Ordered: Enoxaparin (Lovenox)  Mechanical VTE Prophylaxis in Place: Yes    Patient Centered Rounds: I have performed bedside rounds with nursing staff today  Discussions with Specialists or Other Care Team Provider: GI, Surg Onc - monitor over weekend, give PRBC, cont TPN    Education and Discussions with Family / Patient: patient    Time Spent for Care: 30 minutes  More than 50% of total time spent on counseling and coordination of care as described above      Current Length of Stay: 8 day(s)  Current Patient Status: Inpatient     Certification Statement: The patient will continue to require additional inpatient hospital stay due to monitor UC flare, may need surgery    Discharge Plan / Estimated Discharge Date: unclear    Code Status: Level 1 - Full Code      Subjective:   Feels ok, 1 BM today yet, bloody    Objective:     Vitals:   Temp (24hrs), Av 3 °F (36 8 °C), Min:97 9 °F (36 6 °C), Max:98 7 °F (37 1 °C)    Temp:  [97 9 °F (36 6 °C)-98 7 °F (37 1 °C)] 98 2 °F (36 8 °C)  HR:  [72-80] 78  Resp:  [16-20] 18  BP: (110-115)/(60-65) 115/62  SpO2:  [97 %-99 %] 97 %  Body mass index is 26 56 kg/m²  Input and Output Summary (last 24 hours): Intake/Output Summary (Last 24 hours) at 2021 1533  Last data filed at 2021 1429  Gross per 24 hour   Intake 4016 06 ml   Output 875 ml   Net 3141 06 ml       Physical Exam:   Physical Exam  Vitals reviewed  HENT:      Head: Normocephalic and atraumatic  Mouth/Throat:      Mouth: Mucous membranes are moist    Cardiovascular:      Rate and Rhythm: Normal rate and regular rhythm  Heart sounds: Normal heart sounds  Pulmonary:      Effort: Pulmonary effort is normal  No respiratory distress  Breath sounds: Normal breath sounds  No wheezing  Abdominal:      General: There is no distension  Palpations: Abdomen is soft  Tenderness: There is no abdominal tenderness  Neurological:      Mental Status: He is alert and oriented to person, place, and time  Additional Data:     Labs:  Results from last 7 days   Lab Units 21  0458 21  0519 21  0610 21  0540   WBC Thousand/uL 6 85 6 69   < > 6 63   HEMOGLOBIN g/dL 7 0* 6 2*  --  7 0*   HEMATOCRIT % 23 9* 21 1*  --  23 6*   PLATELETS Thousands/uL 261 256   < > 261   BANDS PCT %  --  8  --   --    NEUTROS PCT %  --   --   --  63   LYMPHS PCT %  --   --   --  22   LYMPHO PCT %  --  26  --   --    MONOS PCT %  --   --   --  12   MONO PCT %  --  14*  --   --    EOS PCT %  --  2  --  1    < > = values in this interval not displayed       Results from last 7 days   Lab Units 21  0458   SODIUM mmol/L 138  138   POTASSIUM mmol/L 3 6  3 6   CHLORIDE mmol/L 104  104   CO2 mmol/L 28   BUN mg/dL 10  10   CREATININE mg/dL 0 43*  0 43*   ANION GAP mmol/L 6  6   CALCIUM mg/dL 8 3  8 3   ALBUMIN g/dL 1 9*  1 9*   TOTAL BILIRUBIN mg/dL 0 33  0 33   ALK PHOS U/L 60   ALT U/L 69  69   AST U/L 15  15   GLUCOSE RANDOM mg/dL 151*  151*         Results from last 7 days   Lab Units 07/23/21  1125 07/23/21  0636 07/22/21  2354 07/22/21  1742 07/22/21  1147   POC GLUCOSE mg/dl 140 165* 181* 120 151*               Imaging: No pertinent imaging reviewed      Recent Cultures (last 7 days):           Lines/Drains:  Invasive Devices     Peripherally Inserted Central Catheter Line            PICC Line 59/36/56 Right Basilic 2 days          Peripheral Intravenous Line            Peripheral IV 07/21/21 Left Antecubital 2 days                Telemetry:        Last 24 Hours Medication List:   Current Facility-Administered Medications   Medication Dose Route Frequency Provider Last Rate    acetaminophen  650 mg Oral Q4H PRN Kenneth Campos MD      Adult TPN (CUSTOM BASE/CUSTOM ELECTROLYTE)   Intravenous Continuous TPN Sachin Candelario MD Stopped (07/23/21 1503)    Adult TPN (CUSTOM BASE/CUSTOM ELECTROLYTE)   Intravenous Continuous TPN Ashley Hamlin PA-C      dextrose 5 % and sodium chloride 0 45 % with KCl 20 mEq/L  75 mL/hr Intravenous Continuous Pattie Ivey MD Stopped (07/23/21 1459)    enoxaparin  40 mg Subcutaneous Q24H [address] Puma Zambrano MD      insulin lispro  1-6 Units Subcutaneous Q6H [address] Aspen Hamlin PA-C      iron sucrose  300 mg Intravenous Every Other Day Nisreen Pandey DO      methylPREDNISolone sodium succinate  20 mg Intravenous [facility] Kenneth Campos MD      ondansetron  4 mg Intravenous Q4H PRN Kenneth Campos MD      pantoprazole  40 mg Oral Early Morning Kenneth Campos MD      saccharomyces boulardii  250 mg Oral BID Kenneth Campos MD          Today, Patient Was Seen By: Sachin Candelario MD    ** Please Note: This note has been constructed using a voice recognition system   **

## 2021-07-23 NOTE — ASSESSMENT & PLAN NOTE
· Patient was recently diagnosed with ulcerative colitis, was being treated with mesalamine and Remicade as outpatient  Received 2 doses without much improvement  Presented with syncopal episode  · CT abdomen pelvis showed worsening colitis  · GI following, on intravenous steroids and PPI  · s/p flex sig on 7/20 - severe colitis   Biopsies taken to rule out CMV  · Repeat CRP, remains unchanged  · remicaid infusion (higher dose) 10 mg/kg given on 7/21  · Colorectal surgery following - not a candidate for surgery at present given acute inflammation  · Per GI - keep NPO for bowel rest &give TPN  · Nutrition recs appreciated for TPN  · Plan is monitor response to remicaid, cont bowel rest & reassess in next couple days - if not improving may need surgery  · Follows with Dr Nichols President as outpatient  · Given the high risk of DVT/PE in acute UC flare, GI recommends to continue lovenox, d/w patient he agrees

## 2021-07-23 NOTE — ASSESSMENT & PLAN NOTE
· Acute blood loss anemia in the setting of ulcer colitis flare with bloody BMs as well as iron deficiency anemia  · 1 unit given 7/18, hemoglobin upto 7 8 then downtrending  · Mother very concerned about PRBC & IV iron d/t hemochromatosis & frustrated that underlying condition is not being taken care of  · She wants only GI to decide about iron & PRBC  · 2nd PRBC on 7/22 - hb 6 2 -> 7 0, will give another 1 unit today

## 2021-07-23 NOTE — UTILIZATION REVIEW
Continued Stay Review    Date: 7/23/21                          Current Patient Class: IP Current Level of Care: MS    HPI:21 y o  male initially admitted on 7/15 with UC flare with anemia d/t rectal bleeding with about 4 bloody BMs per day  HGB 7 1  Is on Remicade and steroids  Assessment/Plan:   Pt had hgb of 6 2 yesterday and received 1 U PRBCs transfusion  Today Hgb is 7 0  He is not progressing wel shi steroids and Infliximab  CMV pending, C diff is negative  He is on TPN, ambulating, states he is exhausted and continues to have 4 bloody BMs per day  Abd non-tender and non-distended  Per GI patient may need colectomy next week     Will be NPO for bowel rest, continue IV fluids, TPN, steroids, PPI, Lovenox  Will get 1st dose of Venofer today        Vital Signs:   07/23/21 08:16:31  98 1 °F (36 7 °C)  76  20  112/61  78  98 %  --  --   07/23/21 06:07:15  97 9 °F (36 6 °C)  80  16  111/63  79  97 %  --  --   07/22/21 22:45:52  98 4 °F (36 9 °C)  77  16  110/65  80  99 %  --  --   07/22/21 15:18:11  98 2 °F (36 8 °C)  81  16  109/66  80  98 %  --  --   07/22/21 15:01:39  98 7 °F (37 1 °C)  85  18  110/66  81  96 %  --  --   07/22/21 13:19:46  98 6 °F (37 °C)  87  20  112/65  81  97 %  None (Room air)  Lying   07/22/21 11:43:15  97 9 °F (36 6 °C)  105  18  113/66  82  97 %  None (Room air)  Lying   07/22/21 11:23:41  98 °F (36 7 °C)  107Abnormal   18  111/65  80  97 %  None (Room air)  Lying   07/22/21 07:53:39  98 °F (36 7 °C)  83  20  116/64  81  97 %  None (Room air)  Lying   07/21/21 23:18:30  99 4 °F (37 4 °C)  101  18  127/69  88  97 %  --  --   07/21/21 16:20:13  --  99  --  133/71  92  97 %  --  --   07/21/21 1618  --  107Abnormal   --  --  --  96 %  None (Room air)  --   07/21/21 15:32:07  98 6 °F (37 °C)  102  18  109/65  80  96 %  --  --   07/21/21 07:16:23  98 2 °F (36 8 °C)  98  18  117/66  83  95 %  None (Room air)  Lying     Pertinent Labs/Diagnostic Results:       Results from last 7 days Lab Units 07/23/21  0458 07/22/21  0519 07/21/21  0559 07/20/21  0554 07/19/21  0721 07/18/21  1515 07/17/21  0540   WBC Thousand/uL 6 85 6 69  --   --   --  6 84 6 63   HEMOGLOBIN g/dL 7 0* 6 2* 7 0* 7 4* 7 5* 7 8* 7 0*   HEMATOCRIT % 23 9* 21 1* 23 8* 24 9* 25 0* 25 8* 23 6*   PLATELETS Thousands/uL 261 256  --   --   --  274 261   NEUTROS ABS Thousands/µL  --   --   --   --   --   --  4 22   BANDS PCT %  --  8  --   --   --   --   --          Results from last 7 days   Lab Units 07/23/21  0458 07/17/21  0540   SODIUM mmol/L 138  138 137   POTASSIUM mmol/L 3 6  3 6 4 3   CHLORIDE mmol/L 104  104 104   CO2 mmol/L 28  28 27   ANION GAP mmol/L 6  6 6   BUN mg/dL 10  10 15   CREATININE mg/dL 0 43*  0 43* 0 51*   EGFR ml/min/1 73sq m 165  165 154   CALCIUM mg/dL 8 3  8 3 8 7   MAGNESIUM mg/dL 2 4  --    PHOSPHORUS mg/dL 3 0  --      Results from last 7 days   Lab Units 07/23/21  0458 07/17/21  0540   AST U/L 15  15 22   ALT U/L 69  69 124*   ALK PHOS U/L 60 71   TOTAL PROTEIN g/dL 5 5*  5 6* 6 2*   ALBUMIN g/dL 1 9*  1 9* 1 9*   TOTAL BILIRUBIN mg/dL 0 33  0 33 0 41   BILIRUBIN DIRECT mg/dL 0 09  --      Results from last 7 days   Lab Units 07/23/21  0636 07/22/21  2354 07/22/21  1742 07/22/21  1147   POC GLUCOSE mg/dl 165* 181* 120 151*     Results from last 7 days   Lab Units 07/23/21  0458 07/17/21  0540   GLUCOSE RANDOM mg/dL 151*  151* 98     Results from last 7 days   Lab Units 07/20/21  0554   CRP mg/L 75 8*     Medications:   Scheduled Medications:  enoxaparin, 40 mg, Subcutaneous, Q24H [facility]  insulin lispro, 1-6 Units, Subcutaneous, Q6H [facility]  iron sucrose, 300 mg, Intravenous, Every Other Day  methylPREDNISolone sodium succinate, 20 mg, Intravenous, Q8H [facility]  pantoprazole, 40 mg, Oral, Early Morning  saccharomyces boulardii, 250 mg, Oral, BID      Continuous IV Infusions:  Adult TPN (CUSTOM BASE/CUSTOM ELECTROLYTE), , Intravenous, Continuous TPN  dextrose 5 % and sodium chloride 0 45 % with KCl 20 mEq/L, 75 mL/hr, Intravenous, Continuous      PRN Meds:  acetaminophen, 650 mg, Oral, Q4H PRN  ondansetron, 4 mg, Intravenous, Q4H PRN    Discharge Plan: D    Network Utilization Review Department  ATTENTION: Please call with any questions or concerns to [phone] and carefully listen to the prompts so that you are directed to the right person  All voicemails are confidential   Anne Núñez all requests for admission clinical reviews, approved or denied determinations and any other requests to dedicated fax number below belonging to the campus where the patient is receiving treatment   List of dedicated fax numbers for the Facilities:  [address] DENIALS (Administrative/Medical Necessity) [phone]   [address] (Maternity/NICU/Pediatrics) [phone]   [address] [place] [address] [address] [place] [address] [address] [address] [place] [address] [phone]

## 2021-07-23 NOTE — PROGRESS NOTES
Progress Note - Colorectal Surgery   Roseann Church 24 y o  male MRN: [id number]  Unit/Bed#: [facility] 816-01 Encounter: [id number]    Assessment:  24 y o  male with UC with poor response to medical management  He is currently being managed on steroids and Infliximab  Not progressing    CMV pending  C diff neg  Biopsies pending    Plan:  - Diet GI; Lo Fiber/Lo Residue  Adult 3-in-1 TPN (standard base / standard electrolytes)  - Pain and Nausea control PRN  - OOB, ambulate  - Continue medical management per GI  - f/u CMV biopsy  - recommend avoiding transfusion unless symptomatic  - Trend CBC    Subjective/Objective     Subjective:   No acute events overnight  4 bloody bowel movements last 24 hours  Patient says he is exhausted  Objective:    Blood pressure 110/65, pulse 77, temperature 98 4 °F (36 9 °C), resp  rate 16, height 6' 3" (1 905 m), weight 96 4 kg (212 lb 8 4 oz), SpO2 99 %  ,Body mass index is 26 56 kg/m²        Intake/Output Summary (Last 24 hours) at 7/23/2021 0033  Last data filed at 7/22/2021 2341  Gross per 24 hour   Intake 2651 07 ml   Output 903 ml   Net 1748 07 ml       Invasive Devices     Peripherally Inserted Central Catheter Line            PICC Line 37/73/53 Right Basilic 1 day          Peripheral Intravenous Line            Peripheral IV 07/21/21 Left Antecubital 1 day                Physical Exam:   Gen: NAD, Comfortable  Neuro: A&O, No focal deficits  Head: Normal Cephalic, Atraumatic  Eye: EOMI, PERRLA, No scleral icterus  Neck: Supple, No JVD, Midline trachea  CV: RRR, Cap refill <2 sec  Pulm: Normal work of breathing, no respiratory distress  Abd: Soft, Non-Distended, Non-Tender  Ext: No edema, Non-tender  Skin: warm, dry, intact

## 2021-07-23 NOTE — PROGRESS NOTES
Progress Note- Pattie Collet 24 y o  male MRN: [id number]    Unit/Bed#: [facility] 816-01 Encounter: [id number]      Assessment and Plan:    Ulcerative colitis refractory to IV steroids  · Received accelerated regimen of Remicade x3 doses - last dose on 7/21  · No improvement - possible colectomy next week  · Continue NPO for bowel rest, IVF, IV steroids, PPI, Lovenox  · Monitor electrolytes    Iron deficiency anemia  · Hg 7 today, 1 U PRBC transfused yesterday, 6 2 yesterday  · Continue Venofer - first dose yesterday    Protein-calorie malnutrition  · Continue IVF, TPN    ______________________________________________________________________    Subjective:     Small bloody bowel movement this morning  Pt is sleepy and but responds appropriately  No events overnight  Currently NPO          Medication Administration - last 24 hours from 07/22/2021 0916 to 07/23/2021 0017       Date/Time Order Dose Route Action Action by     07/23/2021 0631 pantoprazole (PROTONIX) EC tablet 40 mg 40 mg Oral Given Pastor Verdin RN     07/23/2021 6262 pantoprazole (PROTONIX) EC tablet 40 mg 40 mg Oral Given aPstor Verdin RN     07/22/2021 1740 saccharomyces boulardii (FLORASTOR) capsule 250 mg 250 mg Oral Given Kimber Mason RN     07/22/2021 6547 saccharomyces boulardii (FLORASTOR) capsule 250 mg 250 mg Oral Given Kimber Mason RN     07/23/2021 0631 methylPREDNISolone sodium succinate (Solu-MEDROL) injection 20 mg 20 mg Intravenous Given Pastor Verdin RN     07/22/2021 2137 methylPREDNISolone sodium succinate (Solu-MEDROL) injection 20 mg 20 mg Intravenous Given Mraisa Virgen RN     07/22/2021 1319 methylPREDNISolone sodium succinate (Solu-MEDROL) injection 20 mg 20 mg Intravenous Given Kimber Mason RN     07/22/2021 0953 enoxaparin (LOVENOX) subcutaneous injection 40 mg 40 mg Subcutaneous Given Kimber Mason RN     07/22/2021 3322 Adult 3-in-1 TPN (standard base / standard electrolytes)   Intravenous Stopped Marivel Wynn RN     07/23/2021 0334 dextrose 5 % and sodium chloride 0 45 % with KCl 20 mEq/L infusion 75 mL/hr Intravenous [address] Ramone Stanley RN     07/23/2021 0332 dextrose 5 % and sodium chloride 0 45 % with KCl 20 mEq/L infusion 0 mL/hr Intravenous Stopped Ramone Stanley RN     07/22/2021 1513 dextrose 5 % and sodium chloride 0 45 % with KCl 20 mEq/L infusion 75 mL/hr Intravenous [facility], [address]     07/22/2021 5532 iron sucrose (VENOFER) 300 mg in sodium chloride 0 9 % 250 mL IVPB   Intravenous Canceled Entry Bonnie Brown RN     07/23/2021 4892 insulin lispro (HumaLOG) 100 units/mL subcutaneous injection 1-6 Units 1 Units Subcutaneous Given Ramone Stanley RN     07/23/2021 0031 insulin lispro (HumaLOG) 100 units/mL subcutaneous injection 1-6 Units 1 Units Subcutaneous Given Ramone Stanley RN     07/22/2021 1742 insulin lispro (HumaLOG) 100 units/mL subcutaneous injection 1-6 Units 1 Units Subcutaneous Not Given Bonnie Brown RN     07/22/2021 1200 insulin lispro (HumaLOG) 100 units/mL subcutaneous injection 1-6 Units 1 Units Subcutaneous Given Bonnie Brown RN     07/22/2021 1243 iron sucrose (VENOFER) 300 mg in sodium chloride 0 9 % 250 mL IVPB 300 mg Intravenous [address] Bonnie Brown, [address] [place] [address]     07/22/2021 2137 Adult 3-in-1 TPN (custom base / custom electrolytes)   Intravenous New Bag Marivel Wynn RN          Objective:     Vitals: Blood pressure 112/61, pulse 76, temperature 98 1 °F (36 7 °C), resp  rate 20, height 6' 3" (1 905 m), weight 96 4 kg (212 lb 8 4 oz), SpO2 98 %  ,Body mass index is 26 56 kg/m²        Intake/Output Summary (Last 24 hours) at 7/23/2021 0916  Last data filed at 7/23/2021 0548  Gross per 24 hour   Intake 4016 06 ml   Output 550 ml   Net 3466 06 ml       Physical Exam:   General Appearance: Awake and alert, in no acute distress  Abdomen: Tender in LLQ, soft, non-distended; bowel sounds normal; no masses or no organomegaly    Invasive Devices Peripherally Inserted Central Catheter Line            PICC Line 40/52/95 Right Basilic 1 day          Peripheral Intravenous Line            Peripheral IV 07/21/21 Left Antecubital 2 days                Lab Results:  Admission on 07/15/2021   Component Date Value    WBC 07/16/2021 6 94     RBC 07/16/2021 3 05*    Hemoglobin 07/16/2021 7 5*    Hematocrit 07/16/2021 25 5*    MCV 07/16/2021 84     MCH 07/16/2021 24 6*    MCHC 07/16/2021 29 4*    RDW 07/16/2021 18 7*    Platelets 28/40/4313 256     MPV 07/16/2021 11 2     Sodium 07/16/2021 137     Potassium 07/16/2021 4 3     Chloride 07/16/2021 103     CO2 07/16/2021 28     ANION GAP 07/16/2021 6     BUN 07/16/2021 14     Creatinine 07/16/2021 0 49*    Glucose 07/16/2021 107     Calcium 07/16/2021 8 9     eGFR 07/16/2021 156     WBC 07/17/2021 6 63     RBC 07/17/2021 2 84*    Hemoglobin 07/17/2021 7 0*    Hematocrit 07/17/2021 23 6*    MCV 07/17/2021 83     MCH 07/17/2021 24 6*    MCHC 07/17/2021 29 7*    RDW 07/17/2021 18 9*    MPV 07/17/2021 11 1     Platelets 73/22/5058 261     nRBC 07/17/2021 1     Neutrophils Relative 07/17/2021 63     Immat GRANS % 07/17/2021 2     Lymphocytes Relative 07/17/2021 22     Monocytes Relative 07/17/2021 12     Eosinophils Relative 07/17/2021 1     Basophils Relative 07/17/2021 0     Neutrophils Absolute 07/17/2021 4 22     Immature Grans Absolute 07/17/2021 0 10     Lymphocytes Absolute 07/17/2021 1 47     Monocytes Absolute 07/17/2021 0 77     Eosinophils Absolute 07/17/2021 0 05     Basophils Absolute 07/17/2021 0 02     Sodium 07/17/2021 137     Potassium 07/17/2021 4 3     Chloride 07/17/2021 104     CO2 07/17/2021 27     ANION GAP 07/17/2021 6     BUN 07/17/2021 15     Creatinine 07/17/2021 0 51*    Glucose 07/17/2021 98     Calcium 07/17/2021 8 7     Corrected Calcium 07/17/2021 10 4*    AST 07/17/2021 22     ALT 07/17/2021 124*    Alkaline Phosphatase 07/17/2021 71  Total Protein 07/17/2021 6 2*    Albumin 07/17/2021 1 9*    Total Bilirubin 07/17/2021 0 41     eGFR 07/17/2021 154     Hemoglobin 07/18/2021 6 9*    Hematocrit 07/18/2021 23 2*    ABO Grouping 07/18/2021 O     Rh Factor 07/18/2021 Positive     Antibody Screen 07/18/2021 Negative     Specimen Expiration Date 07/18/2021 [id number]     Unit Product Code 07/19/2021 [id number]     Unit Number 07/19/2021 [id number]     Unit ABO 07/19/2021 O     Unit RH 07/19/2021 NEG     Crossmatch 07/19/2021 Compatible     Unit Dispense Status 07/19/2021 [facility] to [facility]     Unit Product Code 07/19/2021 F3690V18     Unit Number 07/19/2021 [id number]     Unit ABO 07/19/2021 O     Unit RH 07/19/2021 NEG     Crossmatch 07/19/2021 Compatible     Unit Dispense Status 07/19/2021 Presumed Trans     WBC 07/18/2021 6 84     RBC 07/18/2021 3 04*    Hemoglobin 07/18/2021 7 8*    Hematocrit 07/18/2021 25 8*    MCV 07/18/2021 85     MCH 07/18/2021 25 7*    MCHC 07/18/2021 30 2*    RDW 07/18/2021 19 5*    Platelets 87/[birth year]/1963 274     MPV 07/18/2021 10 7     Hemoglobin 07/19/2021 7 5*    Hematocrit 07/19/2021 25 0*    Hemoglobin 07/20/2021 7 4*    Hematocrit 07/20/2021 24 9*    CRP 07/20/2021 75 8*    Case Report 07/20/2021                      Value:Surgical Pathology Report                         Case: [id number]                                   Authorizing Provider:  Salma Colbert MD           Collected:           07/20/2021 1208              Ordering Location:     [address] [place] [address]      Received:            07/20/2021 [address] [place] [address] 8                                                              Pathologist:           Edvin Spencer MD                                                                 Specimens:   A) - Large Intestine, Sigmoid Colon, cold bx; colitis                                               B) - Rectum, cold bx; colitis  Final Diagnosis 07/20/2021                      Value: This result contains rich text formatting which cannot be displayed here   Note 07/20/2021                      Value: This result contains rich text formatting which cannot be displayed here   Additional Information 07/20/2021                      Value: This result contains rich text formatting which cannot be displayed here  Padmaja Henriquez Description 07/20/2021                      Value: This result contains rich text formatting which cannot be displayed here      Hemoglobin 07/21/2021 7 0*    Hematocrit 07/21/2021 23 8*    WBC 07/22/2021 6 69     RBC 07/22/2021 2 44*    Hemoglobin 07/22/2021 6 2*    Hematocrit 07/22/2021 21 1*    MCV 07/22/2021 87     MCH 07/22/2021 25 4*    MCHC 07/22/2021 29 4*    RDW 07/22/2021 19 9*    MPV 07/22/2021 11 0     Platelets 36/34/3390 256     nRBC 07/22/2021 1     ABO Grouping 07/22/2021 O     Rh Factor 07/22/2021 Positive     Antibody Screen 07/22/2021 Negative     Specimen Expiration Date 07/22/2021 [id number]     Unit Product Code 07/23/2021 [id number]     Unit Number 07/23/2021 [id number]     Unit ABO 07/23/2021 O     Unit RH 07/23/2021 NEG     Crossmatch 07/23/2021 Compatible     Unit Dispense Status 07/23/2021 Presumed Trans     Segmented % 07/22/2021 47     Bands % 07/22/2021 8     Lymphocytes % 07/22/2021 26     Monocytes % 07/22/2021 14*    Eosinophils, % 07/22/2021 2     Basophils % 07/22/2021 1     Metamyelocytes% 07/22/2021 1     Myelocytes % 07/22/2021 1     Absolute Neutrophils 07/22/2021 3 68     Lymphocytes Absolute 07/22/2021 1 74     Monocytes Absolute 07/22/2021 0 94     Eosinophils Absolute 07/22/2021 0 13     Basophils Absolute 07/22/2021 0 07     RBC Morphology 07/22/2021 Present     Anisocytosis 07/22/2021 Present     Hypochromia 07/22/2021 Present     Microcytes 07/22/2021 Present     Polychromasia 07/22/2021 Present     Tear Drop Cells 07/22/2021 Present     Platelet Estimate 00/35/5682 Adequate     POC Glucose 07/22/2021 151*    POC Glucose 07/22/2021 120     POC Glucose 07/22/2021 181*    Sodium 07/23/2021 138     Potassium 07/23/2021 3 6     Chloride 07/23/2021 104     CO2 07/23/2021 28     ANION GAP 07/23/2021 6     BUN 07/23/2021 10     Creatinine 07/23/2021 0 43*    Glucose 07/23/2021 151*    Calcium 07/23/2021 8 3     Corrected Calcium 07/23/2021 10 0     AST 07/23/2021 15     ALT 07/23/2021 69     Alkaline Phosphatase 07/23/2021 60     Total Protein 07/23/2021 5 5*    Albumin 07/23/2021 1 9*    Total Bilirubin 07/23/2021 0 33     eGFR 07/23/2021 165     WBC 07/23/2021 6 85     RBC 07/23/2021 2 73*    Hemoglobin 07/23/2021 7 0*    Hematocrit 07/23/2021 23 9*    MCV 07/23/2021 88     MCH 07/23/2021 25 6*    MCHC 07/23/2021 29 3*    RDW 07/23/2021 19 4*    MPV 07/23/2021 11 1     Platelets 26/19/5127 261     nRBC 07/23/2021 1     Prealbumin 07/23/2021 18 9     Magnesium 07/23/2021 2 4     Bilirubin, Direct 07/23/2021 0 09     Sodium 07/23/2021 138     Potassium 07/23/2021 3 6     Chloride 07/23/2021 104     CO2 07/23/2021 28     ANION GAP 07/23/2021 6     BUN 07/23/2021 10     Creatinine 07/23/2021 0 43*    Glucose 07/23/2021 151*    Calcium 07/23/2021 8 3     eGFR 07/23/2021 165     AST 07/23/2021 15     Phosphorus 07/23/2021 3 0     Total Protein 07/23/2021 5 6*    Albumin 07/23/2021 1 9*    LD 07/23/2021 116     ALT 07/23/2021 69     Triglycerides 07/23/2021 150     Total Bilirubin 07/23/2021 0 33     POC Glucose 07/23/2021 165*       Imaging Studies: I have personally reviewed pertinent imaging studies

## 2021-07-24 LAB
ABO GROUP BLD BPU: NORMAL
BASOPHILS # BLD AUTO: 0.01 THOUSANDS/ΜL (ref 0–0.1)
BASOPHILS NFR BLD AUTO: 0 % (ref 0–1)
BPU ID: NORMAL
C DIFF TOX GENS STL QL NAA+PROBE: NEGATIVE
CROSSMATCH: NORMAL
EOSINOPHIL # BLD AUTO: 0.07 THOUSAND/ΜL (ref 0–0.61)
EOSINOPHIL NFR BLD AUTO: 1 % (ref 0–6)
ERYTHROCYTE [DISTWIDTH] IN BLOOD BY AUTOMATED COUNT: 18.6 % (ref 11.6–15.1)
GLUCOSE SERPL-MCNC: 127 MG/DL (ref 65–140)
GLUCOSE SERPL-MCNC: 130 MG/DL (ref 65–140)
GLUCOSE SERPL-MCNC: 131 MG/DL (ref 65–140)
GLUCOSE SERPL-MCNC: 163 MG/DL (ref 65–140)
HCT VFR BLD AUTO: 27.7 % (ref 36.5–49.3)
HGB BLD-MCNC: 8.3 G/DL (ref 12–17)
IMM GRANULOCYTES # BLD AUTO: 0.11 THOUSAND/UL (ref 0–0.2)
IMM GRANULOCYTES NFR BLD AUTO: 1 % (ref 0–2)
LYMPHOCYTES # BLD AUTO: 1.96 THOUSANDS/ΜL (ref 0.6–4.47)
LYMPHOCYTES NFR BLD AUTO: 25 % (ref 14–44)
MCH RBC QN AUTO: 26 PG (ref 26.8–34.3)
MCHC RBC AUTO-ENTMCNC: 30 G/DL (ref 31.4–37.4)
MCV RBC AUTO: 87 FL (ref 82–98)
MONOCYTES # BLD AUTO: 1.17 THOUSAND/ΜL (ref 0.17–1.22)
MONOCYTES NFR BLD AUTO: 15 % (ref 4–12)
NEUTROPHILS # BLD AUTO: 4.68 THOUSANDS/ΜL (ref 1.85–7.62)
NEUTS SEG NFR BLD AUTO: 58 % (ref 43–75)
NRBC BLD AUTO-RTO: 1 /100 WBCS
PLATELET # BLD AUTO: 243 THOUSANDS/UL (ref 149–390)
PMV BLD AUTO: 10.7 FL (ref 8.9–12.7)
RBC # BLD AUTO: 3.19 MILLION/UL (ref 3.88–5.62)
UNIT DISPENSE STATUS: NORMAL
UNIT PRODUCT CODE: NORMAL
UNIT RH: NORMAL
WBC # BLD AUTO: 8 THOUSAND/UL (ref 4.31–10.16)

## 2021-07-24 PROCEDURE — 85025 COMPLETE CBC W/AUTO DIFF WBC: CPT | Performed by: HOSPITALIST

## 2021-07-24 PROCEDURE — 82948 REAGENT STRIP/BLOOD GLUCOSE: CPT

## 2021-07-24 PROCEDURE — 99233 SBSQ HOSP IP/OBS HIGH 50: CPT | Performed by: COLON & RECTAL SURGERY

## 2021-07-24 PROCEDURE — 99232 SBSQ HOSP IP/OBS MODERATE 35: CPT | Performed by: HOSPITALIST

## 2021-07-24 RX ADMIN — Medication 250 MG: at 17:53

## 2021-07-24 RX ADMIN — Medication 250 MG: at 08:53

## 2021-07-24 RX ADMIN — DEXTROSE, SODIUM CHLORIDE, AND POTASSIUM CHLORIDE 75 ML/HR: 5; .45; .15 INJECTION INTRAVENOUS at 08:54

## 2021-07-24 RX ADMIN — PANTOPRAZOLE SODIUM 40 MG: 40 TABLET, DELAYED RELEASE ORAL at 06:15

## 2021-07-24 RX ADMIN — ENOXAPARIN SODIUM 40 MG: 40 INJECTION SUBCUTANEOUS at 08:53

## 2021-07-24 RX ADMIN — IRON SUCROSE 300 MG: 20 INJECTION, SOLUTION INTRAVENOUS at 08:53

## 2021-07-24 RX ADMIN — DEXTROSE, SODIUM CHLORIDE, AND POTASSIUM CHLORIDE 75 ML/HR: 5; .45; .15 INJECTION INTRAVENOUS at 21:21

## 2021-07-24 RX ADMIN — INSULIN LISPRO 1 UNITS: 100 INJECTION, SOLUTION INTRAVENOUS; SUBCUTANEOUS at 00:40

## 2021-07-24 RX ADMIN — THIAMINE HYDROCHLORIDE: 100 INJECTION, SOLUTION INTRAMUSCULAR; INTRAVENOUS at 21:21

## 2021-07-24 RX ADMIN — METHYLPREDNISOLONE SODIUM SUCCINATE 20 MG: 40 INJECTION, POWDER, FOR SOLUTION INTRAMUSCULAR; INTRAVENOUS at 21:21

## 2021-07-24 RX ADMIN — METHYLPREDNISOLONE SODIUM SUCCINATE 20 MG: 40 INJECTION, POWDER, FOR SOLUTION INTRAMUSCULAR; INTRAVENOUS at 13:45

## 2021-07-24 RX ADMIN — METHYLPREDNISOLONE SODIUM SUCCINATE 20 MG: 40 INJECTION, POWDER, FOR SOLUTION INTRAMUSCULAR; INTRAVENOUS at 06:15

## 2021-07-24 NOTE — PROGRESS NOTES
Progress Note - Colorectal Surgery   Natalie Hernandez 24 y o  male MRN: [id number]  Unit/Bed#: [facility] 816-01 Encounter: [id number]    Assessment:  25 y/o M p/w Ulcerative Colitis, acute blood loss anemia, s/p flexible sigmoidoscopy and biopsy on 7/20    Plan:  --f/u CMV result  --Trend H/H, may require operative intervention if continued bleeding episodes  --PICC/TPN  --Continue medical management per GI recs  --Pain control    Subjective/Objective     Subjective:     No acute events overnight  This morning, pt reports he is continuing to have bloody stools  Denies any abdominal pain, N/V  Objective:     Blood pressure 108/62, pulse 72, temperature 98 °F (36 7 °C), resp  rate 18, height 6' 3" (1 905 m), weight 96 4 kg (212 lb 8 4 oz), SpO2 99 %  ,Body mass index is 26 56 kg/m²  Intake/Output Summary (Last 24 hours) at 7/24/2021 0232  Last data filed at 7/23/2021 2219  Gross per 24 hour   Intake 4965 89 ml   Output 1450 ml   Net 3515 89 ml       Invasive Devices     Peripherally Inserted Central Catheter Line            PICC Line 52/42/64 Right Basilic 2 days          Peripheral Intravenous Line            Peripheral IV 07/21/21 Left Antecubital 2 days                Physical Exam:     GEN: NAD  HEENT: MMM  CV: RRR  Lung: normal effort  Ab: Soft, NT/ND  Extrem: No CCE  Neuro:  A+Ox3, motor and sensation grossly intact    Lab, Imaging and other studies:  CBC:   Lab Results   Component Value Date    WBC 6 85 07/23/2021    HGB 7 0 (L) 07/23/2021    HCT 23 9 (L) 07/23/2021    MCV 88 07/23/2021     07/23/2021    MCH 25 6 (L) 07/23/2021    MCHC 29 3 (L) 07/23/2021    RDW 19 4 (H) 07/23/2021    MPV 11 1 07/23/2021    NRBC 1 07/23/2021   , CMP:   Lab Results   Component Value Date    SODIUM 138 07/23/2021    SODIUM 138 07/23/2021    K 3 6 07/23/2021    K 3 6 07/23/2021     07/23/2021     07/23/2021    CO2 28 07/23/2021    CO2 28 07/23/2021    BUN 10 07/23/2021    BUN 10 07/23/2021    CREATININE 0 43 (L) 07/23/2021    CREATININE 0 43 (L) 07/23/2021    CALCIUM 8 3 07/23/2021    CALCIUM 8 3 07/23/2021    AST 15 07/23/2021    AST 15 07/23/2021    ALT 69 07/23/2021    ALT 69 07/23/2021    ALKPHOS 60 07/23/2021    EGFR 165 07/23/2021    EGFR 165 07/23/2021   , Coagulation: No results found for: PT, INR, APTT  VTE Pharmacologic Prophylaxis: Enoxaparin (Lovenox)  VTE Mechanical Prophylaxis: sequential compression device

## 2021-07-24 NOTE — ASSESSMENT & PLAN NOTE
· Acute blood loss anemia in the setting of ulcer colitis flare with bloody BMs as well as iron deficiency anemia  · 1 unit given 7/18, hemoglobin upto 7 8 then downtrending  · Mother very concerned about PRBC & IV iron d/t hemochromatosis & frustrated that underlying condition is not being taken care of  · She wants only GI to decide about iron & PRBC  · 2nd PRBC on 7/22 - hb 6 2 -> 7 0, will give another 1 unit 7/23  · Hb now 8 3

## 2021-07-24 NOTE — ASSESSMENT & PLAN NOTE
· Patient was recently diagnosed with ulcerative colitis, was being treated with mesalamine and Remicade as outpatient  Received 2 doses without much improvement  Presented with syncopal episode  · CT abdomen pelvis showed worsening colitis  · GI following, on intravenous steroids and PPI  · s/p flex sig on 7/20 - severe colitis   Biopsies taken to rule out CMV  · Repeat CRP, remains unchanged  · remicaid infusion (higher dose) 10 mg/kg given on 7/21  · Colorectal surgery following - not a candidate for surgery at present given acute inflammation  · Per GI - keep NPO for bowel rest &give TPN  · Nutrition recs appreciated for TPN  · Plan is monitor response to remicaid, cont bowel rest & reassess in next couple days - if not improving may need surgery  · Follows with Dr Paulina Ortiz as outpatient  · Given the high risk of DVT/PE in acute UC flare, GI recommends to continue lovenox, d/w patient he agrees

## 2021-07-24 NOTE — PROGRESS NOTES
[facility]  Progress Note - Alexandria Tran 2000, 24 y o  male MRN: [id number]  Unit/Bed#: [facility] 816-01 Encounter: [id number]  Primary Care Provider: Shira Almeida DO   Date and time admitted to hospital: 7/15/2021  9:24 PM    * Colitis, ulcerative ([facility] )  Assessment & Plan  · Patient was recently diagnosed with ulcerative colitis, was being treated with mesalamine and Remicade as outpatient  Received 2 doses without much improvement  Presented with syncopal episode  · CT abdomen pelvis showed worsening colitis  · GI following, on intravenous steroids and PPI  · s/p flex sig on 7/20 - severe colitis  Biopsies taken to rule out CMV  · Repeat CRP, remains unchanged  · remicaid infusion (higher dose) 10 mg/kg given on 7/21  · Colorectal surgery following - not a candidate for surgery at present given acute inflammation  · Per GI - keep NPO for bowel rest &give TPN  · Nutrition recs appreciated for TPN  · Plan is monitor response to remicaid, cont bowel rest & reassess in next couple days - if not improving may need surgery  · Follows with Dr Missael Arzate as outpatient  · Given the high risk of DVT/PE in acute UC flare, GI recommends to continue lovenox, d/w patient he agrees    Severe protein-calorie malnutrition ([facility] )  Assessment & Plan  Malnutrition Findings:   Adult Malnutrition type: Acute illness (Related to Ulcerative colitis as evidenced by 10% weight loss over the past month and <50% energy intake needs met >5 days treated with TPN )  Adult Degree of Malnutrition: Other severe protein calorie malnutrition    BMI Findings: Body mass index is 26 56 kg/m²         ABLA (acute blood loss anemia)  Assessment & Plan  · Acute blood loss anemia in the setting of ulcer colitis flare with bloody BMs as well as iron deficiency anemia  · 1 unit given 7/18, hemoglobin upto 7 8 then downtrending  · Mother very concerned about PRBC & IV iron d/t hemochromatosis & frustrated that underlying condition is not being taken care of  · She wants only GI to decide about iron & PRBC  · 2nd PRBC on 7/22 - hb 6 2 -> 7 0, will give another 1 unit 7/23  · Hb now 8 3    Transaminitis  Assessment & Plan  · Elevated ALT, possibly due to blood loss  · MRI/ MRCP showed normal MRCP with heavy metal deposition in the liver, consistent with hemochromatosis which is likely 2/2 recent iron/blood transfusions  Iron panel not consistent with overload  · Hepatitis B and C, mitochondrial and smooth antibodies negative  · hemochromatosis mutation negative    Iron deficiency anemia  Assessment & Plan  · Continue PO iron supplementation  · Also IV iron added by [facility] Progress Note  Patient: Nu Vidales 24 y o  male   MRN: [id number]  PCP: Lindsay Miller DO  Unit/Bed#: [facility] 880-30 Encounter: [id number]  Date Of Visit: 07/24/21    Assessment:    Principal Problem:    Colitis, ulcerative ([facility] )  Active Problems:    Iron deficiency anemia    Transaminitis    ABLA (acute blood loss anemia)    Severe protein-calorie malnutrition ([facility] )      Plan:         VTE Pharmacologic Prophylaxis:   VTE Score: 0 Moderate Risk (Score 3-4) - Pharmacological DVT Prophylaxis Ordered: Enoxaparin (Lovenox)  Mechanical VTE Prophylaxis in Place: Yes    Patient Centered Rounds: I have performed bedside rounds with nursing staff today  Discussions with Specialists or Other Care Team Provider:     Education and Discussions with Family / Patient: Updated  (mother) at bedside  Time Spent for Care: 30 minutes  More than 50% of total time spent on counseling and coordination of care as described above      Current Length of Stay: 9 day(s)  Current Patient Status: Inpatient     Certification Statement: The patient will continue to require additional inpatient hospital stay due to monitor on medical treatment    Discharge Plan / Estimated Discharge Date: unclear    Code Status: Level 1 - Full Code      Subjective:   Feels ok, 1-2 BM today, only blood    Objective:     Vitals:   Temp (24hrs), Av 8 °F (36 6 °C), Min:97 5 °F (36 4 °C), Max:98 1 °F (36 7 °C)    Temp:  [97 5 °F (36 4 °C)-98 1 °F (36 7 °C)] 98 1 °F (36 7 °C)  HR:  [67-77] 67  Resp:  [16-20] 16  BP: (108-118)/(62-70) 117/70  SpO2:  [97 %-99 %] 98 %  Body mass index is 26 56 kg/m²  Input and Output Summary (last 24 hours): Intake/Output Summary (Last 24 hours) at 2021 1634  Last data filed at 2021 1100  Gross per 24 hour   Intake 2382 9 ml   Output 675 ml   Net 1707 9 ml       Physical Exam:   Physical Exam  Vitals reviewed  HENT:      Head: Normocephalic and atraumatic  Mouth/Throat:      Mouth: Mucous membranes are moist    Cardiovascular:      Rate and Rhythm: Normal rate and regular rhythm  Heart sounds: Normal heart sounds  Pulmonary:      Effort: Pulmonary effort is normal  No respiratory distress  Breath sounds: Normal breath sounds  No wheezing  Abdominal:      General: There is no distension  Palpations: Abdomen is soft  Tenderness: There is no abdominal tenderness  Musculoskeletal:      Right lower leg: No edema  Left lower leg: No edema  Neurological:      Mental Status: He is alert and oriented to person, place, and time            Additional Data:     Labs:  Results from last 7 days   Lab Units 21  0620 21  0519   WBC Thousand/uL 8 00 6 69   HEMOGLOBIN g/dL 8 3* 6 2*   HEMATOCRIT % 27 7* 21 1*   PLATELETS Thousands/uL 243 256   BANDS PCT %  --  8   NEUTROS PCT % 58  --    LYMPHS PCT % 25  --    LYMPHO PCT %  --  26   MONOS PCT % 15*  --    MONO PCT %  --  14*   EOS PCT % 1 2     Results from last 7 days   Lab Units 21  0458   SODIUM mmol/L 138  138   POTASSIUM mmol/L 3 6  3 6   CHLORIDE mmol/L 104  104   CO2 mmol/L 28  28   BUN mg/dL 10  10   CREATININE mg/dL 0 43*  0 43*   ANION GAP mmol/L 6  6   CALCIUM mg/dL 8 3  8 3   ALBUMIN g/dL 1 9* 1 9*   TOTAL BILIRUBIN mg/dL 0 33  0 33   ALK PHOS U/L 60   ALT U/L 69  69   AST U/L 15  15   GLUCOSE RANDOM mg/dL 151*  151*         Results from last 7 days   Lab Units 07/24/21  1139 07/24/21  0606 07/24/21  0003 07/23/21  1755 07/23/21  1125 07/23/21  0636 07/22/21  2354 07/22/21  1742 07/22/21  1147   POC GLUCOSE mg/dl 130 127 163* 117 140 165* 181* 120 151*               Imaging: No pertinent imaging reviewed      Recent Cultures (last 7 days):     Results from last 7 days   Lab Units 07/23/21  1734   C DIFF TOXIN B BY PCR  Negative       Lines/Drains:  Invasive Devices     Peripherally Inserted Central Catheter Line            PICC Line 24/74/78 Right Basilic 3 days          Peripheral Intravenous Line            Peripheral IV 07/21/21 Left Antecubital 3 days                Telemetry:        Last 24 Hours Medication List:   Current Facility-Administered Medications   Medication Dose Route Frequency Provider Last Rate    acetaminophen  650 mg Oral Q4H PRN Vale Pang MD      Adult TPN (CUSTOM BASE/CUSTOM ELECTROLYTE)   Intravenous Continuous TPN Casi Arcos PA-C 116 9 mL/hr at 07/23/21 2211    Adult TPN (CUSTOM BASE/CUSTOM ELECTROLYTE)   Intravenous Continuous TPN Lesa Dumas MD      dextrose 5 % and sodium chloride 0 45 % with KCl 20 mEq/L  75 mL/hr Intravenous Continuous Beatrice Dumas MD 75 mL/hr (07/24/21 0854)    enoxaparin  40 mg Subcutaneous Q24H [facility] Puma Zambrano MD      insulin lispro  1-6 Units Subcutaneous Q6H [facility] Aspen Hamlin PA-C      iron sucrose  300 mg Intravenous Every Other Day Nisreen Pandey DO      methylPREDNISolone sodium succinate  20 mg Intravenous Q8H Sanchez Benítez MD      ondansetron  4 mg Intravenous Q4H PRN Vale Pang MD      pantoprazole  40 mg Oral Early Morning Vale Pang MD      saccharomyces boulardii  250 mg Oral BID Vale Pang MD          Today, Patient Was Seen By: Shoaib Spivey MD    ** Please Note: This note has been constructed using a voice recognition system   **

## 2021-07-25 LAB
ANION GAP SERPL CALCULATED.3IONS-SCNC: 7 MMOL/L (ref 4–13)
BUN SERPL-MCNC: 10 MG/DL (ref 5–25)
CALCIUM SERPL-MCNC: 8.5 MG/DL (ref 8.3–10.1)
CHLORIDE SERPL-SCNC: 106 MMOL/L (ref 100–108)
CO2 SERPL-SCNC: 26 MMOL/L (ref 21–32)
CREAT SERPL-MCNC: 0.38 MG/DL (ref 0.6–1.3)
CRP SERPL QL: 30.2 MG/L
ERYTHROCYTE [DISTWIDTH] IN BLOOD BY AUTOMATED COUNT: 19.3 % (ref 11.6–15.1)
GFR SERPL CREATININE-BSD FRML MDRD: 173 ML/MIN/1.73SQ M
GLUCOSE SERPL-MCNC: 119 MG/DL (ref 65–140)
GLUCOSE SERPL-MCNC: 129 MG/DL (ref 65–140)
GLUCOSE SERPL-MCNC: 129 MG/DL (ref 65–140)
GLUCOSE SERPL-MCNC: 130 MG/DL (ref 65–140)
GLUCOSE SERPL-MCNC: 131 MG/DL (ref 65–140)
HCT VFR BLD AUTO: 27.9 % (ref 36.5–49.3)
HGB BLD-MCNC: 8.4 G/DL (ref 12–17)
MAGNESIUM SERPL-MCNC: 2.1 MG/DL (ref 1.6–2.6)
MCH RBC QN AUTO: 26.4 PG (ref 26.8–34.3)
MCHC RBC AUTO-ENTMCNC: 30.1 G/DL (ref 31.4–37.4)
MCV RBC AUTO: 88 FL (ref 82–98)
PHOSPHATE SERPL-MCNC: 3.7 MG/DL (ref 2.7–4.5)
PLATELET # BLD AUTO: 256 THOUSANDS/UL (ref 149–390)
PMV BLD AUTO: 11 FL (ref 8.9–12.7)
POTASSIUM SERPL-SCNC: 3.5 MMOL/L (ref 3.5–5.3)
RBC # BLD AUTO: 3.18 MILLION/UL (ref 3.88–5.62)
SODIUM SERPL-SCNC: 139 MMOL/L (ref 136–145)
WBC # BLD AUTO: 6.3 THOUSAND/UL (ref 4.31–10.16)

## 2021-07-25 PROCEDURE — 86140 C-REACTIVE PROTEIN: CPT | Performed by: STUDENT IN AN ORGANIZED HEALTH CARE EDUCATION/TRAINING PROGRAM

## 2021-07-25 PROCEDURE — 99232 SBSQ HOSP IP/OBS MODERATE 35: CPT | Performed by: INTERNAL MEDICINE

## 2021-07-25 PROCEDURE — 83735 ASSAY OF MAGNESIUM: CPT | Performed by: HOSPITALIST

## 2021-07-25 PROCEDURE — 99232 SBSQ HOSP IP/OBS MODERATE 35: CPT | Performed by: HOSPITALIST

## 2021-07-25 PROCEDURE — 99233 SBSQ HOSP IP/OBS HIGH 50: CPT | Performed by: COLON & RECTAL SURGERY

## 2021-07-25 PROCEDURE — 80048 BASIC METABOLIC PNL TOTAL CA: CPT | Performed by: STUDENT IN AN ORGANIZED HEALTH CARE EDUCATION/TRAINING PROGRAM

## 2021-07-25 PROCEDURE — 82948 REAGENT STRIP/BLOOD GLUCOSE: CPT

## 2021-07-25 PROCEDURE — 84100 ASSAY OF PHOSPHORUS: CPT | Performed by: HOSPITALIST

## 2021-07-25 PROCEDURE — 85027 COMPLETE CBC AUTOMATED: CPT | Performed by: HOSPITALIST

## 2021-07-25 RX ADMIN — ENOXAPARIN SODIUM 40 MG: 40 INJECTION SUBCUTANEOUS at 09:40

## 2021-07-25 RX ADMIN — METHYLPREDNISOLONE SODIUM SUCCINATE 20 MG: 40 INJECTION, POWDER, FOR SOLUTION INTRAMUSCULAR; INTRAVENOUS at 06:25

## 2021-07-25 RX ADMIN — DEXTROSE, SODIUM CHLORIDE, AND POTASSIUM CHLORIDE 75 ML/HR: 5; .45; .15 INJECTION INTRAVENOUS at 10:57

## 2021-07-25 RX ADMIN — THIAMINE HYDROCHLORIDE: 100 INJECTION, SOLUTION INTRAMUSCULAR; INTRAVENOUS at 21:50

## 2021-07-25 RX ADMIN — Medication 250 MG: at 09:40

## 2021-07-25 RX ADMIN — Medication 250 MG: at 17:41

## 2021-07-25 RX ADMIN — PANTOPRAZOLE SODIUM 40 MG: 40 TABLET, DELAYED RELEASE ORAL at 06:26

## 2021-07-25 RX ADMIN — METHYLPREDNISOLONE SODIUM SUCCINATE 20 MG: 40 INJECTION, POWDER, FOR SOLUTION INTRAMUSCULAR; INTRAVENOUS at 13:32

## 2021-07-25 RX ADMIN — METHYLPREDNISOLONE SODIUM SUCCINATE 20 MG: 40 INJECTION, POWDER, FOR SOLUTION INTRAMUSCULAR; INTRAVENOUS at 21:49

## 2021-07-25 NOTE — ASSESSMENT & PLAN NOTE
· Patient was recently diagnosed with ulcerative colitis, was being treated with mesalamine and Remicade as outpatient  Received 2 doses without much improvement  Presented with syncopal episode  · CT abdomen pelvis showed worsening colitis  · GI following, on intravenous steroids and PPI  · s/p flex sig on 7/20 - severe colitis   Biopsies taken to rule out CMV - CMV neg in Biopsy, cultures pending  · Repeat CRP, was unchanged; now down to 30  · remicaid infusion (higher dose) 10 mg/kg given on 7/21  · Colorectal surgery following - ongoing eval for surgery if he fails  · Per GI - keep NPO for bowel rest & give TPN  · Nutrition recs appreciated for TPN  · Plan is monitor response to remicaid, cont bowel rest & reassess in next couple days - if not improving may need surgery  · Follows with Dr Galvan Cea as outpatient  · Given the high risk of DVT/PE in acute UC flare, GI recommends to continue lovenox, d/w patient he agrees  · Hb stable now, CRP decreasing

## 2021-07-25 NOTE — PROGRESS NOTES
Progress Note - [facility]  Roberto Carlos Latif 24 y o  male MRN: [id number]  Unit/Bed#: [facility] 816-01 Encounter: [id number]      ASSESSMENT AND PLAN:    1  Severe ulcerative colitis - newly diagnosed ulcerative colitis with severe active inflammation in the rectum and sigmoid in furthermore skip colitis in the cecum and transverse colon  Received accelerated dose of Remicade with 3rd dose given on 07/21 (10 mg/kg)  Also remains on IV steroids  Continues to have bloody BM with for recorded over the last 24 hours  Abdominal pain improved but overall, disease remains severe and active  Patient is mother also informed that [employer] results suggestive of UC  Biopsies negative for CMV  · Colorectal surgery following; appreciate recommendations   · May ultimately require surgery for his disease but will depend on continued clinical progress with medical therapy  · Continue IV Solu-Medrol  · Recheck CRP and will continue to trend to assess response to medical therapy  · Follow clinically with serial abdominal exams and monitoring of stool output  · Follow hemoglobin closely but continue Lovenox for DVT prophylaxis given his severe active disease and risk for VTE  · Continue TPN for nutritional support  · Will hold off on EGD given stable Hb and unlikely to  at this time    2  Iron deficiency anemia - Hb remains stable  Has not required any additional transfusions since 07/23  · Monitor blood counts  · Transfuse as needed for hemoglobin less than 7 and/or symptomatic anemia   · Agree with IV iron supplementation  · Continue Lovenox for DVT prophylaxis in setting of severe active UC    3  Severe protein calorie malnutrition - started on TPN on 07/22    · Continue TPN  · Remains NPO except ice chips and water  · May consider advancing diet in the next day or so depending on clinical progress  · Monitor electrolytes    ______________________________________________________________________    SUBJECTIVE:     Patient seen and evaluated at bedside  Reports abdominal pain is improving  Has had 4 bloody bowel movements recorded over the last 24 hours  Overall feels about the same as he did yesterday      Medication Administration - last 24 hours from 07/24/2021 0820 to 07/25/2021 0820       Date/Time Order Dose Route Action Action by     07/25/2021 0626 pantoprazole (PROTONIX) EC tablet 40 mg 40 mg Oral Given Radha Braden RN     07/24/2021 1753 saccharomyces boulardii (FLORASTOR) capsule 250 mg 250 mg Oral Given Liliane Allison RN     07/24/2021 0099 saccharomyces boulardii (FLORASTOR) capsule 250 mg 250 mg Oral Given Liliane Allison RN     07/25/2021 1523 methylPREDNISolone sodium succinate (Solu-MEDROL) injection 20 mg 20 mg Intravenous Given Radah Braden RN     07/24/2021 2121 methylPREDNISolone sodium succinate (Solu-MEDROL) injection 20 mg 20 mg Intravenous Given Radha Braden RN     07/24/2021 1345 methylPREDNISolone sodium succinate (Solu-MEDROL) injection 20 mg 20 mg Intravenous Given Liliane Allison RN     07/24/2021 0853 enoxaparin (LOVENOX) subcutaneous injection 40 mg 40 mg Subcutaneous Given Liliane Allison RN     07/24/2021 2121 dextrose 5 % and sodium chloride 0 45 % with KCl 20 mEq/L infusion 75 mL/hr Intravenous [address] Radha Braden [address] [place] [address]     07/24/2021 2120 dextrose 5 % and sodium chloride 0 45 % with KCl 20 mEq/L infusion 0 mL/hr Intravenous Stopped Radha Braden RN     07/24/2021 0854 dextrose 5 % and sodium chloride 0 45 % with KCl 20 mEq/L infusion 75 mL/hr Intravenous [address] Liliane Allison [address] [place] [address]     07/24/2021 0853 dextrose 5 % and sodium chloride 0 45 % with KCl 20 mEq/L infusion 0 mL/hr Intravenous Stopped Liliane Allison RN     07/25/2021 1297 insulin lispro (HumaLOG) 100 units/mL subcutaneous injection 1-6 Units 1 Units Subcutaneous Not Given Radha Braden, RN     07/25/2021 0029 insulin lispro (HumaLOG) 100 units/mL subcutaneous injection 1-6 Units 1 Units Subcutaneous Not Given Uche Jones RN     07/24/2021 1756 insulin lispro (HumaLOG) 100 units/mL subcutaneous injection 1-6 Units 1 Units Subcutaneous Not Given Pita Toure RN     07/24/2021 1204 insulin lispro (HumaLOG) 100 units/mL subcutaneous injection 1-6 Units 1 Units Subcutaneous Not Given Pita Toure RN     07/24/2021 8311 iron sucrose (VENOFER) 300 mg in sodium chloride 0 9 % 250 mL IVPB 300 mg Intravenous [address] Pita Toure, [facility]     07/24/2021 7266 Adult 3-in-1 TPN (custom base / custom electrolytes)   Intravenous Stopped Pita Toure RN     07/25/2021 9230 Adult 3-in-1 TPN (custom base / custom electrolytes)   Intravenous Stopped Pita Toure RN     07/24/2021 2121 Adult 3-in-1 TPN (custom base / custom electrolytes)   Intravenous New Bag Uche Jones RN          OBJECTIVE:     Objective   Blood pressure 111/59, pulse 67, temperature 98 2 °F (36 8 °C), resp  rate 16, height 6' 3" (1 905 m), weight 96 4 kg (212 lb 8 4 oz), SpO2 99 %  Body mass index is 26 56 kg/m²  Intake/Output Summary (Last 24 hours) at 7/25/2021 0820  Last data filed at 7/25/2021 2507  Gross per 24 hour   Intake 1895 ml   Output 725 ml   Net 1170 ml       PHYSICAL EXAM:   General Appearance: Awake and alert, in no acute distress  Abdomen: Soft, some TTP in lower quadrants, non-distended; bowel sounds normal; no masses or no organomegaly    Invasive Devices     Peripherally Inserted Central Catheter Line            PICC Line 39/48/27 Right Basilic 3 days          Peripheral Intravenous Line            Peripheral IV 07/21/21 Left Antecubital 4 days                LAB RESULTS:  No results displayed because visit has over 200 results  RADIOLOGY RESULTS: I have personally reviewed pertinent imaging studies  JULES Archer    Gastroenterology Fellow  [facility]  Available on [email or web address]

## 2021-07-25 NOTE — ASSESSMENT & PLAN NOTE
· Acute blood loss anemia in the setting of ulcer colitis flare with bloody BMs as well as iron deficiency anemia  · 1 unit given 7/18, hemoglobin upto 7 8 then downtrending  · Mother very concerned about PRBC & IV iron d/t hemochromatosis & frustrated that underlying condition is not being taken care of  · She wants only GI to decide about iron & PRBC  · 2nd PRBC on 7/22 - hb 6 2 -> 7 0, will give another 1 unit 7/23  · Hb now 8 4

## 2021-07-25 NOTE — PROGRESS NOTES
Progress Note - Colorectal Surgery   Shine Mccarthy 24 y o  male MRN: [id number]  Unit/Bed#: [facility] 816-01 Encounter: [id number]    Assessment:  25 y/o M p/w Ulcerative Colitis, acute blood loss anemia, s/p flexible sigmoidoscopy and biopsy on 7/20    Plan:  --can have a diet per colorectal perspective   --f/u CMV result  --Trend H/H, may require operative intervention if continued bleeding episodes  --PICC/TPN  --Continue medical management per GI recs  --Pain control    Subjective/Objective     Subjective:   No acute events overnight  4 episodes of bloody bowel movements  -N  -V  + fatigue  Objective:     Blood pressure 110/68, pulse 77, temperature 98 6 °F (37 °C), resp  rate 19, height 6' 3" (1 905 m), weight 96 4 kg (212 lb 8 4 oz), SpO2 97 %  ,Body mass index is 26 56 kg/m²        Intake/Output Summary (Last 24 hours) at 7/24/2021 2333  Last data filed at 7/24/2021 2120  Gross per 24 hour   Intake 1895 ml   Output 625 ml   Net 1270 ml       Invasive Devices     Peripherally Inserted Central Catheter Line            PICC Line 07/60/22 Right Basilic 3 days          Peripheral Intravenous Line            Peripheral IV 07/21/21 Left Antecubital 3 days                Physical Exam:   Gen: NAD, Comfortable  Neuro: A&O, No focal deficits  Head: Normal Cephalic, Atraumatic  Eye: EOMI, PERRLA, No scleral icterus  Neck: Supple, No JVD, Midline trachea  CV: RRR, Cap refill <2 sec  Pulm: Normal work of breathing, no respiratory distress  Abd: Soft, Non-Distended, mild Tenderness  Ext: No edema, Non-tender  Skin: warm, dry, intact

## 2021-07-25 NOTE — PROGRESS NOTES
[facility] [facility]  Progress Note - Pattie Collet 2000, 24 y o  male MRN: [id number]  Unit/Bed#: [facility] 816-01 Encounter: [id number]  Primary Care Provider: Vince Kong DO   Date and time admitted to hospital: 7/15/2021  9:24 PM    * Colitis, ulcerative ([facility] )  Assessment & Plan  · Patient was recently diagnosed with ulcerative colitis, was being treated with mesalamine and Remicade as outpatient  Received 2 doses without much improvement  Presented with syncopal episode  · CT abdomen pelvis showed worsening colitis  · GI following, on intravenous steroids and PPI  · s/p flex sig on 7/20 - severe colitis  Biopsies taken to rule out CMV - CMV neg in Biopsy, cultures pending  · Repeat CRP, was unchanged; now down to 30  · remicaid infusion (higher dose) 10 mg/kg given on 7/21  · Colorectal surgery following - ongoing eval for surgery if he fails  · Per GI - keep NPO for bowel rest & give TPN  · Nutrition recs appreciated for TPN  · Plan is monitor response to remicaid, cont bowel rest & reassess in next couple days - if not improving may need surgery  · Follows with Dr Samson Reyes as outpatient  · Given the high risk of DVT/PE in acute UC flare, GI recommends to continue lovenox, d/w patient he agrees  · Hb stable now, CRP decreasing    Severe protein-calorie malnutrition ([facility] )  Assessment & Plan  Malnutrition Findings:   Adult Malnutrition type: Acute illness (Related to Ulcerative colitis as evidenced by 10% weight loss over the past month and <50% energy intake needs met >5 days treated with TPN )  Adult Degree of Malnutrition: Other severe protein calorie malnutrition    BMI Findings: Body mass index is 26 56 kg/m²         ABLA (acute blood loss anemia)  Assessment & Plan  · Acute blood loss anemia in the setting of ulcer colitis flare with bloody BMs as well as iron deficiency anemia  · 1 unit given 7/18, hemoglobin upto 7 8 then downtrending  · Mother very concerned about PRBC & IV iron d/t hemochromatosis & frustrated that underlying condition is not being taken care of  · She wants only GI to decide about iron & PRBC  · 2nd PRBC on 7/22 - hb 6 2 -> 7 0, will give another 1 unit 7/23  · Hb now 8 4    Transaminitis  Assessment & Plan  · Elevated ALT, possibly due to blood loss  · MRI/ MRCP showed normal MRCP with heavy metal deposition in the liver, consistent with hemochromatosis which is likely 2/2 recent iron/blood transfusions  Iron panel not consistent with overload  · Hepatitis B and C, mitochondrial and smooth antibodies negative  · hemochromatosis mutation negative    Iron deficiency anemia  Assessment & Plan  · Continue PO iron supplementation  · S/p IV venofer by [facility] Progress Note  Patient: Roya Ospina 24 y o  male   MRN: [id number]  PCP: Hilario Chua DO  Unit/Bed#: [facility] 407-58 Encounter: [id number]  Date Of Visit: 07/25/21    Assessment:    Principal Problem:    Colitis, ulcerative ([facility] )  Active Problems:    Iron deficiency anemia    Transaminitis    ABLA (acute blood loss anemia)    Severe protein-calorie malnutrition ([facility] )      Plan:           VTE Pharmacologic Prophylaxis:   VTE Score: 0 Moderate Risk (Score 3-4) - Pharmacological DVT Prophylaxis Ordered: Enoxaparin (Lovenox)  Mechanical VTE Prophylaxis in Place: Yes    Patient Centered Rounds: I have performed bedside rounds with nursing staff today  Discussions with Specialists or Other Care Team Provider:     Education and Discussions with Family / Patient: Updated  (mother) at bedside  Time Spent for Care: 30 minutes  More than 50% of total time spent on counseling and coordination of care as described above      Current Length of Stay: 10 day(s)  Current Patient Status: Inpatient     Certification Statement: The patient will continue to require additional inpatient hospital stay due to monitor UC flare    Discharge Plan / Estimated Discharge Date: unclear    Code Status: Level 1 - Full Code      Subjective:   3-4 small, bloody BMs a day, no N/V    Objective:     Vitals:   Temp (24hrs), Av 3 °F (36 8 °C), Min:98 1 °F (36 7 °C), Max:98 6 °F (37 °C)    Temp:  [98 1 °F (36 7 °C)-98 6 °F (37 °C)] 98 2 °F (36 8 °C)  HR:  [67-77] 67  Resp:  [16-19] 16  BP: (110-117)/(59-70) 111/59  SpO2:  [97 %-99 %] 99 %  Body mass index is 26 56 kg/m²  Input and Output Summary (last 24 hours): Intake/Output Summary (Last 24 hours) at 2021 1352  Last data filed at 2021 1055  Gross per 24 hour   Intake 2912 5 ml   Output 625 ml   Net 2287 5 ml       Physical Exam:   Physical Exam  Vitals reviewed  HENT:      Head: Normocephalic and atraumatic  Mouth/Throat:      Mouth: Mucous membranes are moist    Cardiovascular:      Rate and Rhythm: Normal rate and regular rhythm  Heart sounds: Normal heart sounds  Pulmonary:      Effort: Pulmonary effort is normal  No respiratory distress  Breath sounds: Normal breath sounds  No wheezing  Abdominal:      General: There is no distension  Palpations: Abdomen is soft  Musculoskeletal:      Right lower leg: No edema  Left lower leg: No edema  Neurological:      Mental Status: He is alert and oriented to person, place, and time            Additional Data:     Labs:  Results from last 7 days   Lab Units 21  0625 21  0620 21  0519   WBC Thousand/uL 6 30 8 00 6 69   HEMOGLOBIN g/dL 8 4* 8 3* 6 2*   HEMATOCRIT % 27 9* 27 7* 21 1*   PLATELETS Thousands/uL 256 243 256   BANDS PCT %  --   --  8   NEUTROS PCT %  --  58  --    LYMPHS PCT %  --  25  --    LYMPHO PCT %  --   --  26   MONOS PCT %  --  15*  --    MONO PCT %  --   --  14*   EOS PCT %  --  1 2     Results from last 7 days   Lab Units 21  0625 21  0458   SODIUM mmol/L 139 138  138   POTASSIUM mmol/L 3 5 3 6  3 6   CHLORIDE mmol/L 106 104  104   CO2 mmol/L 26 28  28   BUN mg/dL 10 10  10 CREATININE mg/dL 0 38* 0 43*  0 43*   ANION GAP mmol/L 7 6  6   CALCIUM mg/dL 8 5 8 3  8 3   ALBUMIN g/dL  --  1 9*  1 9*   TOTAL BILIRUBIN mg/dL  --  0 33  0 33   ALK PHOS U/L  --  60   ALT U/L  --  69  69   AST U/L  --  15  15   GLUCOSE RANDOM mg/dL 119 151*  151*         Results from last 7 days   Lab Units 07/25/21  1134 07/25/21  0620 07/24/21  2345 07/24/21  1754 07/24/21  1139 07/24/21  0606 07/24/21  0003 07/23/21  1755 07/23/21  1125 07/23/21  0636 07/22/21  2354 07/22/21  1742   POC GLUCOSE mg/dl 131 129 130 131 130 127 163* 117 140 165* 181* 120               Imaging: No pertinent imaging reviewed      Recent Cultures (last 7 days):     Results from last 7 days   Lab Units 07/23/21  1734   C DIFF TOXIN B BY PCR  Negative       Lines/Drains:  Invasive Devices     Peripherally Inserted Central Catheter Line            PICC Line 95/70/68 Right Basilic 3 days                Telemetry:        Last 24 Hours Medication List:   Current Facility-Administered Medications   Medication Dose Route Frequency Provider Last Rate    acetaminophen  650 mg Oral Q4H PRN Tato Myers MD      Adult TPN (CUSTOM BASE/CUSTOM ELECTROLYTE)   Intravenous Continuous TPN Lin De La Torre  9 mL/hr at 07/24/21 2121    Adult TPN (CUSTOM BASE/CUSTOM ELECTROLYTE)   Intravenous Continuous TPN Max Dinero MD      dextrose 5 % and sodium chloride 0 45 % with KCl 20 mEq/L  75 mL/hr Intravenous Continuous Max Dinero MD 75 mL/hr (07/25/21 1057)    enoxaparin  40 mg Subcutaneous Q24H [facility] Puma Zambrano MD      insulin lispro  1-6 Units Subcutaneous Q6H [facility] Aspen Hamlin PA-C      iron sucrose  300 mg Intravenous Every Other Day Shelia Pandey DO      methylPREDNISolone sodium succinate  20 mg Intravenous [facility] Tato Myers MD      ondansetron  4 mg Intravenous Q4H PRN Tato Myers MD      pantoprazole  40 mg Oral Early Morning MD Emily Milligan  250 mg Oral BID Adelita Ellison MD          Today, Patient Was Seen By: Deanne Marcial MD    ** Please Note: This note has been constructed using a voice recognition system   **

## 2021-07-26 LAB
ABO GROUP BLD: NORMAL
ALBUMIN SERPL BCP-MCNC: 2.1 G/DL (ref 3.5–5)
ALP SERPL-CCNC: 68 U/L (ref 46–116)
ALT SERPL W P-5'-P-CCNC: 82 U/L (ref 12–78)
ANION GAP SERPL CALCULATED.3IONS-SCNC: 7 MMOL/L (ref 4–13)
AST SERPL W P-5'-P-CCNC: 19 U/L (ref 5–45)
BASOPHILS # BLD AUTO: 0.03 THOUSANDS/ΜL (ref 0–0.1)
BASOPHILS NFR BLD AUTO: 0 % (ref 0–1)
BILIRUB SERPL-MCNC: 0.42 MG/DL (ref 0.2–1)
BLD GP AB SCN SERPL QL: NEGATIVE
BUN SERPL-MCNC: 10 MG/DL (ref 5–25)
CALCIUM ALBUM COR SERPL-MCNC: 9.7 MG/DL (ref 8.3–10.1)
CALCIUM SERPL-MCNC: 8.2 MG/DL (ref 8.3–10.1)
CHLORIDE SERPL-SCNC: 106 MMOL/L (ref 100–108)
CMV SPEC QL CULT: NORMAL
CO2 SERPL-SCNC: 26 MMOL/L (ref 21–32)
CREAT SERPL-MCNC: 0.46 MG/DL (ref 0.6–1.3)
CRP SERPL QL: 18.5 MG/L
EOSINOPHIL # BLD AUTO: 0.13 THOUSAND/ΜL (ref 0–0.61)
EOSINOPHIL NFR BLD AUTO: 2 % (ref 0–6)
ERYTHROCYTE [DISTWIDTH] IN BLOOD BY AUTOMATED COUNT: 19.4 % (ref 11.6–15.1)
GFR SERPL CREATININE-BSD FRML MDRD: 160 ML/MIN/1.73SQ M
GLUCOSE SERPL-MCNC: 107 MG/DL (ref 65–140)
GLUCOSE SERPL-MCNC: 116 MG/DL (ref 65–140)
GLUCOSE SERPL-MCNC: 129 MG/DL (ref 65–140)
GLUCOSE SERPL-MCNC: 129 MG/DL (ref 65–140)
GLUCOSE SERPL-MCNC: 137 MG/DL (ref 65–140)
GLUCOSE SERPL-MCNC: 88 MG/DL (ref 65–140)
HCT VFR BLD AUTO: 27.5 % (ref 36.5–49.3)
HGB BLD-MCNC: 8.5 G/DL (ref 12–17)
IMM GRANULOCYTES # BLD AUTO: 0.18 THOUSAND/UL (ref 0–0.2)
IMM GRANULOCYTES NFR BLD AUTO: 2 % (ref 0–2)
LYMPHOCYTES # BLD AUTO: 3.34 THOUSANDS/ΜL (ref 0.6–4.47)
LYMPHOCYTES NFR BLD AUTO: 40 % (ref 14–44)
MAGNESIUM SERPL-MCNC: 2.2 MG/DL (ref 1.6–2.6)
MCH RBC QN AUTO: 27.3 PG (ref 26.8–34.3)
MCHC RBC AUTO-ENTMCNC: 30.9 G/DL (ref 31.4–37.4)
MCV RBC AUTO: 88 FL (ref 82–98)
MONOCYTES # BLD AUTO: 1.18 THOUSAND/ΜL (ref 0.17–1.22)
MONOCYTES NFR BLD AUTO: 14 % (ref 4–12)
NEUTROPHILS # BLD AUTO: 3.5 THOUSANDS/ΜL (ref 1.85–7.62)
NEUTS SEG NFR BLD AUTO: 42 % (ref 43–75)
NRBC BLD AUTO-RTO: 0 /100 WBCS
PHOSPHATE SERPL-MCNC: 3.4 MG/DL (ref 2.7–4.5)
PLATELET # BLD AUTO: 238 THOUSANDS/UL (ref 149–390)
PMV BLD AUTO: 10.9 FL (ref 8.9–12.7)
POTASSIUM SERPL-SCNC: 3.2 MMOL/L (ref 3.5–5.3)
PROT SERPL-MCNC: 5.8 G/DL (ref 6.4–8.2)
RBC # BLD AUTO: 3.11 MILLION/UL (ref 3.88–5.62)
RH BLD: POSITIVE
SODIUM SERPL-SCNC: 139 MMOL/L (ref 136–145)
SPECIMEN EXPIRATION DATE: NORMAL
WBC # BLD AUTO: 8.36 THOUSAND/UL (ref 4.31–10.16)

## 2021-07-26 PROCEDURE — 99233 SBSQ HOSP IP/OBS HIGH 50: CPT | Performed by: INTERNAL MEDICINE

## 2021-07-26 PROCEDURE — 85025 COMPLETE CBC W/AUTO DIFF WBC: CPT | Performed by: STUDENT IN AN ORGANIZED HEALTH CARE EDUCATION/TRAINING PROGRAM

## 2021-07-26 PROCEDURE — 99233 SBSQ HOSP IP/OBS HIGH 50: CPT | Performed by: COLON & RECTAL SURGERY

## 2021-07-26 PROCEDURE — 83735 ASSAY OF MAGNESIUM: CPT | Performed by: HOSPITALIST

## 2021-07-26 PROCEDURE — 82948 REAGENT STRIP/BLOOD GLUCOSE: CPT

## 2021-07-26 PROCEDURE — 80053 COMPREHEN METABOLIC PANEL: CPT | Performed by: HOSPITALIST

## 2021-07-26 PROCEDURE — 99233 SBSQ HOSP IP/OBS HIGH 50: CPT | Performed by: HOSPITALIST

## 2021-07-26 PROCEDURE — 84100 ASSAY OF PHOSPHORUS: CPT | Performed by: HOSPITALIST

## 2021-07-26 PROCEDURE — 86140 C-REACTIVE PROTEIN: CPT | Performed by: PHYSICIAN ASSISTANT

## 2021-07-26 PROCEDURE — 86901 BLOOD TYPING SEROLOGIC RH(D): CPT | Performed by: PHYSICIAN ASSISTANT

## 2021-07-26 PROCEDURE — 86923 COMPATIBILITY TEST ELECTRIC: CPT

## 2021-07-26 PROCEDURE — 86900 BLOOD TYPING SEROLOGIC ABO: CPT | Performed by: PHYSICIAN ASSISTANT

## 2021-07-26 PROCEDURE — 86850 RBC ANTIBODY SCREEN: CPT | Performed by: PHYSICIAN ASSISTANT

## 2021-07-26 RX ORDER — NEOMYCIN SULFATE 500 MG/1
1000 TABLET ORAL 3 TIMES DAILY
Status: COMPLETED | OUTPATIENT
Start: 2021-07-26 | End: 2021-07-26

## 2021-07-26 RX ORDER — METRONIDAZOLE 500 MG/1
500 TABLET ORAL 3 TIMES DAILY
Status: COMPLETED | OUTPATIENT
Start: 2021-07-26 | End: 2021-07-26

## 2021-07-26 RX ADMIN — Medication 250 MG: at 17:33

## 2021-07-26 RX ADMIN — Medication 250 MG: at 09:05

## 2021-07-26 RX ADMIN — NEOMYCIN SULFATE 1000 MG: 500 TABLET ORAL at 22:18

## 2021-07-26 RX ADMIN — DEXTROSE, SODIUM CHLORIDE, AND POTASSIUM CHLORIDE 75 ML/HR: 5; .45; .15 INJECTION INTRAVENOUS at 01:13

## 2021-07-26 RX ADMIN — IRON SUCROSE 300 MG: 20 INJECTION, SOLUTION INTRAVENOUS at 09:05

## 2021-07-26 RX ADMIN — METHYLPREDNISOLONE SODIUM SUCCINATE 20 MG: 40 INJECTION, POWDER, FOR SOLUTION INTRAMUSCULAR; INTRAVENOUS at 13:57

## 2021-07-26 RX ADMIN — NEOMYCIN SULFATE 1000 MG: 500 TABLET ORAL at 14:54

## 2021-07-26 RX ADMIN — METHYLPREDNISOLONE SODIUM SUCCINATE 20 MG: 40 INJECTION, POWDER, FOR SOLUTION INTRAMUSCULAR; INTRAVENOUS at 21:04

## 2021-07-26 RX ADMIN — METRONIDAZOLE 500 MG: 500 TABLET ORAL at 13:56

## 2021-07-26 RX ADMIN — NEOMYCIN SULFATE 1000 MG: 500 TABLET ORAL at 13:56

## 2021-07-26 RX ADMIN — METRONIDAZOLE 500 MG: 500 TABLET ORAL at 14:54

## 2021-07-26 RX ADMIN — ENOXAPARIN SODIUM 40 MG: 40 INJECTION SUBCUTANEOUS at 09:05

## 2021-07-26 RX ADMIN — METRONIDAZOLE 500 MG: 500 TABLET ORAL at 22:18

## 2021-07-26 RX ADMIN — THIAMINE HYDROCHLORIDE: 100 INJECTION, SOLUTION INTRAMUSCULAR; INTRAVENOUS at 21:01

## 2021-07-26 RX ADMIN — DEXTROSE, SODIUM CHLORIDE, AND POTASSIUM CHLORIDE 75 ML/HR: 5; .45; .15 INJECTION INTRAVENOUS at 17:28

## 2021-07-26 RX ADMIN — METHYLPREDNISOLONE SODIUM SUCCINATE 20 MG: 40 INJECTION, POWDER, FOR SOLUTION INTRAMUSCULAR; INTRAVENOUS at 06:33

## 2021-07-26 RX ADMIN — PANTOPRAZOLE SODIUM 40 MG: 40 TABLET, DELAYED RELEASE ORAL at 06:33

## 2021-07-26 NOTE — ASSESSMENT & PLAN NOTE
· Patient was recently diagnosed with ulcerative colitis, was being treated with mesalamine and Remicade as outpatient  Received 2 doses without much improvement  Presented with syncopal episode  · CT abdomen pelvis showed worsening colitis  · GI following, on intravenous steroids and PPI  · s/p flex sig on 7/20 - severe colitis  Biopsies taken to rule out CMV - CMV neg in Biopsy, cultures negative  · Repeat CRP, was unchanged; now down to 30  · remicaid infusion (higher dose) 10 mg/kg given on 7/21  · Colorectal surgery following - do he seems to be doing better, CRP better, bowel movements smaller but still bloody, not requiring transfusion in last 2-3 days he might be turning around the corner but given his prolonged course there are chances that this may get worse up can - hence Colorectal planning to re-evaluate him in the morning and consider him for OR later in the day  · GI also following  · Nutrition recs appreciated for TPN  · Follows with Dr Giuseppe Garzon as outpatient  · Given the high risk of DVT/PE in acute UC flare, GI recommends to continue lovenox, d/w patient he agrees  · Preop preparation initiated, NPO after midnight, check CRP in a m

## 2021-07-26 NOTE — PROGRESS NOTES
[facility]  Progress Note - Alexandria Tran 2000, 24 y o  male MRN: [id number]  Unit/Bed#: [facility] 816-01 Encounter: [id number]  Primary Care Provider: Shira Almeida DO   Date and time admitted to hospital: 7/15/2021  9:24 PM    * Colitis, ulcerative ([facility] )  Assessment & Plan  · Patient was recently diagnosed with ulcerative colitis, was being treated with mesalamine and Remicade as outpatient  Received 2 doses without much improvement  Presented with syncopal episode  · CT abdomen pelvis showed worsening colitis  · GI following, on intravenous steroids and PPI  · s/p flex sig on 7/20 - severe colitis  Biopsies taken to rule out CMV - CMV neg in Biopsy, cultures negative  · Repeat CRP, was unchanged; now down to 30  · remicaid infusion (higher dose) 10 mg/kg given on 7/21  · Colorectal surgery following - do he seems to be doing better, CRP better, bowel movements smaller but still bloody, not requiring transfusion in last 2-3 days he might be turning around the corner but given his prolonged course there are chances that this may get worse up can - hence Colorectal planning to re-evaluate him in the morning and consider him for OR later in the day  · GI also following  · Nutrition recs appreciated for TPN  · Follows with Dr Missael Arzate as outpatient  · Given the high risk of DVT/PE in acute UC flare, GI recommends to continue lovenox, d/w patient he agrees  · Preop preparation initiated, NPO after midnight, check CRP in a m  Severe protein-calorie malnutrition ([facility] )  Assessment & Plan  Malnutrition Findings:   Adult Malnutrition type: Acute illness (Related to Ulcerative colitis as evidenced by 10% weight loss over the past month and <50% energy intake needs met >5 days treated with TPN )  Adult Degree of Malnutrition: Other severe protein calorie malnutrition    BMI Findings: Body mass index is 26 56 kg/m²         ABLA (acute blood loss anemia)  Assessment & Plan  · Acute blood loss anemia in the setting of ulcer colitis flare with bloody BMs as well as iron deficiency anemia  · 1 unit given 7/18, hemoglobin upto 7 8 then downtrending  · Mother very concerned about PRBC & IV iron d/t hemochromatosis & frustrated that underlying condition is not being taken care of  · She wants only GI to decide about iron & PRBC  · 2nd PRBC on 7/22 - hb 6 2 -> 7 0, will give another 1 unit 7/23  · Hb now stable to better    Transaminitis  Assessment & Plan  · Elevated ALT, possibly due to blood loss  · MRI/ MRCP showed normal MRCP with heavy metal deposition in the liver, consistent with hemochromatosis which is likely 2/2 recent iron/blood transfusions  Iron panel not consistent with overload  · Hepatitis B and C, mitochondrial and smooth antibodies negative  · hemochromatosis mutation negative    Iron deficiency anemia  Assessment & Plan  · Continue PO iron supplementation  · S/p IV venofer by [facility] Progress Note  Patient: Cosmo Dillon 24 y o  male   MRN: [id number]  PCP: Dawood Villagran DO  Unit/Bed#: [facility] 541-23 Encounter: [id number]  Date Of Visit: 07/26/21    Assessment:    Principal Problem:    Colitis, ulcerative ([facility] )  Active Problems:    Iron deficiency anemia    Transaminitis    ABLA (acute blood loss anemia)    Severe protein-calorie malnutrition ([facility] )      Plan:         VTE Pharmacologic Prophylaxis:   VTE Score: 0 Moderate Risk (Score 3-4) - Pharmacological DVT Prophylaxis Ordered: Enoxaparin (Lovenox)  Mechanical VTE Prophylaxis in Place: Yes    Patient Centered Rounds: I have performed bedside rounds with nursing staff today  Discussions with Specialists or Other Care Team Provider:  Colorectal surgery - possible OR tomorrow    Education and Discussions with Family / Patient: Updated  (mother) at bedside  Time Spent for Care: 30 minutes    More than 50% of total time spent on counseling and coordination of care as described above  Current Length of Stay: 11 day(s)  Current Patient Status: Inpatient     Certification Statement: The patient will continue to require additional inpatient hospital stay due to Monitoring response and planning possible surgery    Discharge Plan / Estimated Discharge Date: Unclear    Code Status: Level 1 - Full Code      Subjective:   Feels okay, continues to have bloody bowel movements, small in amount    Objective:     Vitals:   Temp (24hrs), Av 2 °F (36 8 °C), Min:98 1 °F (36 7 °C), Max:98 3 °F (36 8 °C)    Temp:  [98 1 °F (36 7 °C)-98 3 °F (36 8 °C)] 98 3 °F (36 8 °C)  HR:  [63-94] 94  Resp:  [16-20] 20  BP: (90-96)/(54-59) 96/59  SpO2:  [97 %-98 %] 97 %  Body mass index is 26 56 kg/m²  Input and Output Summary (last 24 hours): Intake/Output Summary (Last 24 hours) at 2021 1917  Last data filed at 2021 1734  Gross per 24 hour   Intake 1068 75 ml   Output 700 ml   Net 368 75 ml       Physical Exam:   Physical Exam  Vitals reviewed  HENT:      Head: Normocephalic and atraumatic  Mouth/Throat:      Mouth: Mucous membranes are moist    Cardiovascular:      Rate and Rhythm: Normal rate and regular rhythm  Heart sounds: Normal heart sounds  Pulmonary:      Effort: Pulmonary effort is normal  No respiratory distress  Breath sounds: Normal breath sounds  No wheezing  Abdominal:      General: There is no distension  Palpations: Abdomen is soft  Tenderness: There is no abdominal tenderness  Neurological:      Mental Status: He is alert and oriented to person, place, and time            Additional Data:     Labs:  Results from last 7 days   Lab Units 21  0638 21  0458 21  0519   WBC Thousand/uL 8 36  --  6 69   HEMOGLOBIN g/dL 8 5*  --  6 2*   HEMATOCRIT % 27 5*  --  21 1*   PLATELETS Thousands/uL 238  --  256   BANDS PCT %  --   --  8   NEUTROS PCT % 42*   < >  --    LYMPHS PCT % 40   < >  --    LYMPHO PCT %  --   --  26 MONOS PCT % 14*   < >  --    MONO PCT %  --   --  14*   EOS PCT % 2   < > 2    < > = values in this interval not displayed  Results from last 7 days   Lab Units 07/26/21  0638   SODIUM mmol/L 139   POTASSIUM mmol/L 3 2*   CHLORIDE mmol/L 106   CO2 mmol/L 26   BUN mg/dL 10   CREATININE mg/dL 0 46*   ANION GAP mmol/L 7   CALCIUM mg/dL 8 2*   ALBUMIN g/dL 2 1*   TOTAL BILIRUBIN mg/dL 0 42   ALK PHOS U/L 68   ALT U/L 82*   AST U/L 19   GLUCOSE RANDOM mg/dL 88         Results from last 7 days   Lab Units 07/26/21  1830 07/26/21  1213 07/26/21  0632 07/26/21  0010 07/25/21  1742 07/25/21  1134 07/25/21  0620 07/24/21  2345 07/24/21  1754 07/24/21  1139 07/24/21  0606 07/24/21  0003   POC GLUCOSE mg/dl 137 129 116 107 129 131 129 130 131 130 127 163*               Imaging: No pertinent imaging reviewed      Recent Cultures (last 7 days):     Results from last 7 days   Lab Units 07/23/21  1734   C DIFF TOXIN B BY PCR  Negative       Lines/Drains:  Invasive Devices     Peripherally Inserted Central Catheter Line            PICC Line 69/73/74 Right Basilic 5 days                Telemetry:        Last 24 Hours Medication List:   Current Facility-Administered Medications   Medication Dose Route Frequency Provider Last Rate    acetaminophen  650 mg Oral Q4H PRN Laurel Vila MD      Adult TPN (CUSTOM BASE/CUSTOM ELECTROLYTE)   Intravenous Continuous TPN Zurdo De La Garza  9 mL/hr at 07/25/21 2150    Adult TPN (CUSTOM BASE/CUSTOM ELECTROLYTE)   Intravenous Continuous TPN Herman Wild PA-C      dextrose 5 % and sodium chloride 0 45 % with KCl 20 mEq/L  75 mL/hr Intravenous Continuous Zurdo De La Garza MD 75 mL/hr (07/26/21 1728)    enoxaparin  40 mg Subcutaneous Q24H [address] Puma Zambrano MD      insulin lispro  1-6 Units Subcutaneous Q6H [address] Aspen Hamlin PA-C      methylPREDNISolone sodium succinate  20 mg Intravenous [facility] Laurel Vila MD      metroNIDAZOLE  500 mg Oral TID Walt Cotton Adia Sierra PA-C      neomycin  1,000 mg Oral TID Antony Hamlin PA-C      ondansetron  4 mg Intravenous Q4H PRN Michelle Chopra MD      pantoprazole  40 mg Oral Early Morning Michelle Chopra MD      saccharomyces boulardii  250 mg Oral BID Michelle Chopra MD          Today, Patient Was Seen By: Johanne Smallwood MD    ** Please Note: This note has been constructed using a voice recognition system   **

## 2021-07-26 NOTE — PROGRESS NOTES
Progress Note - Colorectal Surgery   Roya Heredia 24 y o  male MRN: [id number]  Unit/Bed#: [facility] 816-01 Encounter: [id number]    Assessment:  25 y/o M p/w Ulcerative Colitis, acute blood loss anemia, s/p flexible sigmoidoscopy and biopsy on 7/20  Still having multiple episodes of bloody BM; one large one overnight  Hb: 8 4 (last transfusion: 7/23; one unit of packed cells)  Tolerating small amounts of clears  PLAN:  - continue clears  - TPN  - continue solumedrol per GI recs  - possible OR if continued refractoriness to medical management  Subjective:   - as stated above in assessment  Objective:     Vitals: Temp:  [98 1 °F (36 7 °C)-98 2 °F (36 8 °C)] 98 2 °F (36 8 °C)  HR:  [63-81] 63  Resp:  [16-17] 17  BP: ()/(54-67) 90/54  Body mass index is 26 56 kg/m²  I/O       07/24 0701 - 07/25 0700 07/25 0701 - 07/26 0700    P  O   120    I V  (mL/kg) 1895 (19 7) 2086 3 (21 6)    Total Intake(mL/kg) 1895 (19 7) 2206 3 (22 9)    Urine (mL/kg/hr) 0 (0) 0 (0)    Stool 725 100    Total Output 725 100    Net +1170 +2106 3          Unmeasured Urine Occurrence 1 x 2 x          Physical Exam:  GEN: pale and ill looking  HEENT: atraumatic  CV: RRR  Lung: Normal effort  Ab: Soft, ND, non tender  Extrem: No CCE  Neuro: A+Ox3    Lab, Imaging and other studies:   I have personally reviewed pertinent reports    , CBC with diff:   Lab Results   Component Value Date    WBC 6 30 07/25/2021    HGB 8 4 (L) 07/25/2021    HCT 27 9 (L) 07/25/2021    MCV 88 07/25/2021     07/25/2021    MCH 26 4 (L) 07/25/2021    MCHC 30 1 (L) 07/25/2021    RDW 19 3 (H) 07/25/2021    MPV 11 0 07/25/2021   , BMP/CMP:   Lab Results   Component Value Date    SODIUM 139 07/25/2021    K 3 5 07/25/2021     07/25/2021    CO2 26 07/25/2021    BUN 10 07/25/2021    CREATININE 0 38 (L) 07/25/2021    CALCIUM 8 5 07/25/2021    EGFR 173 07/25/2021     VTE Pharmacologic Prophylaxis: Enoxaparin (Lovenox)  VTE Mechanical Prophylaxis: sequential compression device

## 2021-07-26 NOTE — ASSESSMENT & PLAN NOTE
· Acute blood loss anemia in the setting of ulcer colitis flare with bloody BMs as well as iron deficiency anemia  · 1 unit given 7/18, hemoglobin upto 7 8 then downtrending  · Mother very concerned about PRBC & IV iron d/t hemochromatosis & frustrated that underlying condition is not being taken care of  · She wants only GI to decide about iron & PRBC  · 2nd PRBC on 7/22 - hb 6 2 -> 7 0, will give another 1 unit 7/23  · Hb now stable to better

## 2021-07-26 NOTE — PROGRESS NOTES
Progress Note- Roya Heredia 24 y o  male MRN: [id number]    Unit/Bed#: [facility] 026-43 Encounter: [id number]      Assessment and Plan:    25 y/o male with newly diagnosed ulcerative colitis refractory to steroids  On an accelerated schedule of Remicade  Third dose was on 7/21  Has required multiple blood transfusions due to iron deficiency anemia  Has not received blood since 7/23  Third dose of Venofer today  Ulcerative colitis refractory to steroids  · Continue IV steroids, IVF  · Seems to be responding to Remicade - CRP downtrending - 30 2 yesterday, 75 8 on 7/20  · Check TPMT level  · Check CRP tomorrow  · CMV negative    Iron deficiency anemia  · Third dose of Venofer today  · Hg stable - 8 5 today  · Continue to monitor Hg and transfuse if < 7 or symptomatic anemia    Protein-calorie malnutrition  · Continue TPN, clear liquid diet     · Monitor electrolytes and replace as needed    ______________________________________________________________________    Subjective:     Sleeping  His mother said that yesterday he sat in the chair and used his laptop for a few hours like he normally does  Continues to have bloody bowel movements      Medication Administration - last 24 hours from 07/25/2021 0955 to 07/26/2021 0955       Date/Time Order Dose Route Action Action by     07/26/2021 0633 pantoprazole (PROTONIX) EC tablet 40 mg 40 mg Oral Given Jagruti Abad RN     07/26/2021 5435 saccharomyces boulardii (FLORASTOR) capsule 250 mg 250 mg Oral Given Ric Yen RN     07/25/2021 1741 saccharomyces boulardii (FLORASTOR) capsule 250 mg 250 mg Oral Given Bennie Archibald, [address] [facility]     07/26/2021 2497 methylPREDNISolone sodium succinate (Solu-MEDROL) injection 20 mg 20 mg Intravenous Given Jagruti Abad RN     07/25/2021 2149 methylPREDNISolone sodium succinate (Solu-MEDROL) injection 20 mg 20 mg Intravenous Given Bennie Archibald RN     07/25/2021 1332 methylPREDNISolone sodium succinate (Solu-MEDROL) injection 20 mg 20 mg Intravenous Given Rashel Anderson RN     07/26/2021 0905 enoxaparin (LOVENOX) subcutaneous injection 40 mg 40 mg Subcutaneous Given Torrey Jefferson RN     07/26/2021 0113 dextrose 5 % and sodium chloride 0 45 % with KCl 20 mEq/L infusion 75 mL/hr Intravenous [address] [facility]     07/26/2021 0112 dextrose 5 % and sodium chloride 0 45 % with KCl 20 mEq/L infusion 0 mL/hr Intravenous Stopped Naren Torres RN     07/25/2021 1057 dextrose 5 % and sodium chloride 0 45 % with KCl 20 mEq/L infusion 75 mL/hr Intravenous [place]ac[place] RN     07/25/2021 1055 dextrose 5 % and sodium chloride 0 45 % with KCl 20 mEq/L infusion 0 mL/hr Intravenous Stopped Curt Allen RN     07/26/2021 5587 insulin lispro (HumaLOG) 100 units/mL subcutaneous injection 1-6 Units 1 Units Subcutaneous Not Given Naren Torres RN     07/26/2021 0012 insulin lispro (HumaLOG) 100 units/mL subcutaneous injection 1-6 Units 1 Units Subcutaneous Not Given Naren Torres RN     07/25/2021 1743 insulin lispro (HumaLOG) 100 units/mL subcutaneous injection 1-6 Units 1 Units Subcutaneous Not Given Rashel Anderson RN     07/25/2021 1136 insulin lispro (HumaLOG) 100 units/mL subcutaneous injection 1-6 Units 1 Units Subcutaneous Not Given Rashel Anderson RN     07/26/2021 1234 iron sucrose (VENOFER) 300 mg in sodium chloride 0 9 % 250 mL IVPB 300 mg Intravenous [address] [facility]     07/26/2021 2677 Adult 3-in-1 TPN (custom base / custom electrolytes)   Intravenous Stopped Torrey Jefferson RN     07/25/2021 2150 Adult 3-in-1 TPN (custom base / custom electrolytes)   Intravenous [place] Rashel Anderson RN          Objective:     Vitals: Blood pressure 91/56, pulse 63, temperature 98 1 °F (36 7 °C), resp  rate 16, height 6' 3" (1 905 m), weight 96 4 kg (212 lb 8 4 oz), SpO2 98 %  ,Body mass index is 26 56 kg/m²        Intake/Output Summary (Last 24 hours) at 7/26/2021 0938  Last data filed at 7/26/2021 7379  Gross per 24 hour   Intake 2206 25 ml   Output 500 ml   Net 1706 25 ml       Physical Exam:   General Appearance: Awake and alert, in no acute distress  Abdomen: Mild tenderness in all quadrants, soft, non-distended; bowel sounds normal; no masses or no organomegaly    Invasive Devices     Peripherally Inserted Central Catheter Line            PICC Line 56/16/69 Right Basilic 4 days                Lab Results:  No results displayed because visit has over 200 results  Imaging Studies: I have personally reviewed pertinent imaging studies

## 2021-07-27 ENCOUNTER — ANESTHESIA EVENT (INPATIENT)
Dept: PERIOP | Facility: HOSPITAL | Age: 21
DRG: 230 | End: 2021-07-27
Payer: COMMERCIAL

## 2021-07-27 ENCOUNTER — ANESTHESIA (INPATIENT)
Dept: PERIOP | Facility: HOSPITAL | Age: 21
DRG: 230 | End: 2021-07-27
Payer: COMMERCIAL

## 2021-07-27 LAB
ANION GAP SERPL CALCULATED.3IONS-SCNC: 4 MMOL/L (ref 4–13)
BASE EXCESS BLDA CALC-SCNC: -1 MMOL/L (ref -2–3)
BASE EXCESS BLDA CALC-SCNC: -3 MMOL/L (ref -2–3)
BASOPHILS # BLD AUTO: 0.02 THOUSANDS/ΜL (ref 0–0.1)
BASOPHILS # BLD MANUAL: 0 THOUSAND/UL (ref 0–0.1)
BASOPHILS NFR BLD AUTO: 0 % (ref 0–1)
BASOPHILS NFR MAR MANUAL: 0 % (ref 0–1)
BUN SERPL-MCNC: 11 MG/DL (ref 5–25)
CA-I BLD-SCNC: 1.15 MMOL/L (ref 1.12–1.32)
CA-I BLD-SCNC: 1.23 MMOL/L (ref 1.12–1.32)
CALCIUM SERPL-MCNC: 8.3 MG/DL (ref 8.3–10.1)
CHLORIDE SERPL-SCNC: 107 MMOL/L (ref 100–108)
CO2 SERPL-SCNC: 26 MMOL/L (ref 21–32)
CREAT SERPL-MCNC: 0.5 MG/DL (ref 0.6–1.3)
CRP SERPL QL: 19.6 MG/L
DACRYOCYTES BLD QL SMEAR: PRESENT
EOSINOPHIL # BLD AUTO: 0.14 THOUSAND/ΜL (ref 0–0.61)
EOSINOPHIL # BLD MANUAL: 0 THOUSAND/UL (ref 0–0.4)
EOSINOPHIL NFR BLD AUTO: 2 % (ref 0–6)
EOSINOPHIL NFR BLD MANUAL: 0 % (ref 0–6)
ERYTHROCYTE [DISTWIDTH] IN BLOOD BY AUTOMATED COUNT: 18.7 % (ref 11.6–15.1)
ERYTHROCYTE [DISTWIDTH] IN BLOOD BY AUTOMATED COUNT: 19.8 % (ref 11.6–15.1)
GFR SERPL CREATININE-BSD FRML MDRD: 155 ML/MIN/1.73SQ M
GLUCOSE SERPL-MCNC: 109 MG/DL (ref 65–140)
GLUCOSE SERPL-MCNC: 118 MG/DL (ref 65–140)
GLUCOSE SERPL-MCNC: 126 MG/DL (ref 65–140)
GLUCOSE SERPL-MCNC: 148 MG/DL (ref 65–140)
GLUCOSE SERPL-MCNC: 150 MG/DL (ref 65–140)
GLUCOSE SERPL-MCNC: 161 MG/DL (ref 65–140)
HCO3 BLDA-SCNC: 21.5 MMOL/L (ref 22–28)
HCO3 BLDA-SCNC: 24.6 MMOL/L (ref 22–28)
HCT VFR BLD AUTO: 22.6 % (ref 36.5–49.3)
HCT VFR BLD AUTO: 26 % (ref 36.5–49.3)
HCT VFR BLD CALC: 19 % (ref 36.5–49.3)
HCT VFR BLD CALC: 20 % (ref 36.5–49.3)
HGB BLD-MCNC: 7 G/DL (ref 12–17)
HGB BLD-MCNC: 7.9 G/DL (ref 12–17)
HGB BLDA-MCNC: 6.5 G/DL (ref 12–17)
HGB BLDA-MCNC: 6.8 G/DL (ref 12–17)
HYPERCHROMIA BLD QL SMEAR: PRESENT
IMM GRANULOCYTES # BLD AUTO: 0.16 THOUSAND/UL (ref 0–0.2)
IMM GRANULOCYTES NFR BLD AUTO: 2 % (ref 0–2)
INR PPP: 1.17 (ref 0.84–1.19)
LYMPHOCYTES # BLD AUTO: 0.44 THOUSAND/UL (ref 0.6–4.47)
LYMPHOCYTES # BLD AUTO: 3.04 THOUSANDS/ΜL (ref 0.6–4.47)
LYMPHOCYTES # BLD AUTO: 7 % (ref 14–44)
LYMPHOCYTES NFR BLD AUTO: 35 % (ref 14–44)
MCH RBC QN AUTO: 27.3 PG (ref 26.8–34.3)
MCH RBC QN AUTO: 27.9 PG (ref 26.8–34.3)
MCHC RBC AUTO-ENTMCNC: 30.4 G/DL (ref 31.4–37.4)
MCHC RBC AUTO-ENTMCNC: 31 G/DL (ref 31.4–37.4)
MCV RBC AUTO: 90 FL (ref 82–98)
MCV RBC AUTO: 90 FL (ref 82–98)
METAMYELOCYTES NFR BLD MANUAL: 6 % (ref 0–1)
MONOCYTES # BLD AUTO: 0.38 THOUSAND/UL (ref 0–1.22)
MONOCYTES # BLD AUTO: 1.18 THOUSAND/ΜL (ref 0.17–1.22)
MONOCYTES NFR BLD AUTO: 14 % (ref 4–12)
MONOCYTES NFR BLD: 6 % (ref 4–12)
MYELOCYTES NFR BLD MANUAL: 3 % (ref 0–1)
NEUTROPHILS # BLD AUTO: 4.07 THOUSANDS/ΜL (ref 1.85–7.62)
NEUTROPHILS # BLD MANUAL: 4.9 THOUSAND/UL (ref 1.85–7.62)
NEUTS BAND NFR BLD MANUAL: 24 % (ref 0–8)
NEUTS SEG NFR BLD AUTO: 47 % (ref 43–75)
NEUTS SEG NFR BLD AUTO: 54 % (ref 43–75)
NRBC BLD AUTO-RTO: 1 /100 WBCS
NRBC BLD AUTO-RTO: 1 /100 WBCS
OVALOCYTES BLD QL SMEAR: PRESENT
PCO2 BLD: 22 MMOL/L (ref 21–32)
PCO2 BLD: 26 MMOL/L (ref 21–32)
PCO2 BLD: 33.5 MM HG (ref 36–44)
PCO2 BLD: 44.1 MM HG (ref 36–44)
PH BLD: 7.35 [PH] (ref 7.35–7.45)
PH BLD: 7.42 [PH] (ref 7.35–7.45)
PLATELET # BLD AUTO: 181 THOUSANDS/UL (ref 149–390)
PLATELET # BLD AUTO: 222 THOUSANDS/UL (ref 149–390)
PLATELET BLD QL SMEAR: ADEQUATE
PMV BLD AUTO: 11 FL (ref 8.9–12.7)
PMV BLD AUTO: 11.1 FL (ref 8.9–12.7)
PO2 BLD: 196 MM HG (ref 75–129)
PO2 BLD: 211 MM HG (ref 75–129)
POIKILOCYTOSIS BLD QL SMEAR: PRESENT
POLYCHROMASIA BLD QL SMEAR: PRESENT
POTASSIUM BLD-SCNC: 3.8 MMOL/L (ref 3.5–5.3)
POTASSIUM BLD-SCNC: 4.3 MMOL/L (ref 3.5–5.3)
POTASSIUM SERPL-SCNC: 2.9 MMOL/L (ref 3.5–5.3)
PROTHROMBIN TIME: 14.9 SECONDS (ref 11.6–14.5)
RBC # BLD AUTO: 2.51 MILLION/UL (ref 3.88–5.62)
RBC # BLD AUTO: 2.89 MILLION/UL (ref 3.88–5.62)
SAO2 % BLD FROM PO2: 100 % (ref 60–85)
SAO2 % BLD FROM PO2: 100 % (ref 60–85)
SODIUM BLD-SCNC: 136 MMOL/L (ref 136–145)
SODIUM BLD-SCNC: 137 MMOL/L (ref 136–145)
SODIUM SERPL-SCNC: 137 MMOL/L (ref 136–145)
SPECIMEN SOURCE: ABNORMAL
SPECIMEN SOURCE: ABNORMAL
TOTAL CELLS COUNTED SPEC: 100
WBC # BLD AUTO: 6.28 THOUSAND/UL (ref 4.31–10.16)
WBC # BLD AUTO: 8.61 THOUSAND/UL (ref 4.31–10.16)

## 2021-07-27 PROCEDURE — 99232 SBSQ HOSP IP/OBS MODERATE 35: CPT | Performed by: INTERNAL MEDICINE

## 2021-07-27 PROCEDURE — 99233 SBSQ HOSP IP/OBS HIGH 50: CPT | Performed by: INTERNAL MEDICINE

## 2021-07-27 PROCEDURE — P9016 RBC LEUKOCYTES REDUCED: HCPCS

## 2021-07-27 PROCEDURE — 88307 TISSUE EXAM BY PATHOLOGIST: CPT | Performed by: PATHOLOGY

## 2021-07-27 PROCEDURE — 99233 SBSQ HOSP IP/OBS HIGH 50: CPT | Performed by: COLON & RECTAL SURGERY

## 2021-07-27 PROCEDURE — 84295 ASSAY OF SERUM SODIUM: CPT

## 2021-07-27 PROCEDURE — 80048 BASIC METABOLIC PNL TOTAL CA: CPT | Performed by: PHYSICIAN ASSISTANT

## 2021-07-27 PROCEDURE — 85027 COMPLETE CBC AUTOMATED: CPT | Performed by: NURSE ANESTHETIST, CERTIFIED REGISTERED

## 2021-07-27 PROCEDURE — 84132 ASSAY OF SERUM POTASSIUM: CPT

## 2021-07-27 PROCEDURE — 82542 COL CHROMOTOGRAPHY QUAL/QUAN: CPT | Performed by: STUDENT IN AN ORGANIZED HEALTH CARE EDUCATION/TRAINING PROGRAM

## 2021-07-27 PROCEDURE — 44210 LAPARO TOTAL PROCTOCOLECTOMY: CPT | Performed by: COLON & RECTAL SURGERY

## 2021-07-27 PROCEDURE — 82803 BLOOD GASES ANY COMBINATION: CPT

## 2021-07-27 PROCEDURE — 0DTG0ZZ RESECTION OF LEFT LARGE INTESTINE, OPEN APPROACH: ICD-10-PCS | Performed by: COLON & RECTAL SURGERY

## 2021-07-27 PROCEDURE — 85014 HEMATOCRIT: CPT

## 2021-07-27 PROCEDURE — 82947 ASSAY GLUCOSE BLOOD QUANT: CPT

## 2021-07-27 PROCEDURE — 0D1B0Z4 BYPASS ILEUM TO CUTANEOUS, OPEN APPROACH: ICD-10-PCS | Performed by: COLON & RECTAL SURGERY

## 2021-07-27 PROCEDURE — 82948 REAGENT STRIP/BLOOD GLUCOSE: CPT

## 2021-07-27 PROCEDURE — 85025 COMPLETE CBC W/AUTO DIFF WBC: CPT | Performed by: HOSPITALIST

## 2021-07-27 PROCEDURE — 86140 C-REACTIVE PROTEIN: CPT | Performed by: HOSPITALIST

## 2021-07-27 PROCEDURE — 85610 PROTHROMBIN TIME: CPT | Performed by: PHYSICIAN ASSISTANT

## 2021-07-27 PROCEDURE — 82330 ASSAY OF CALCIUM: CPT

## 2021-07-27 PROCEDURE — C9290 INJ, BUPIVACAINE LIPOSOME: HCPCS | Performed by: ANESTHESIOLOGY

## 2021-07-27 PROCEDURE — 85007 BL SMEAR W/DIFF WBC COUNT: CPT | Performed by: NURSE ANESTHETIST, CERTIFIED REGISTERED

## 2021-07-27 RX ORDER — SODIUM CHLORIDE, SODIUM LACTATE, POTASSIUM CHLORIDE, CALCIUM CHLORIDE 600; 310; 30; 20 MG/100ML; MG/100ML; MG/100ML; MG/100ML
125 INJECTION, SOLUTION INTRAVENOUS CONTINUOUS
Status: DISCONTINUED | OUTPATIENT
Start: 2021-07-27 | End: 2021-07-28

## 2021-07-27 RX ORDER — FENTANYL CITRATE/PF 50 MCG/ML
25 SYRINGE (ML) INJECTION
Status: DISCONTINUED | OUTPATIENT
Start: 2021-07-27 | End: 2021-07-27 | Stop reason: HOSPADM

## 2021-07-27 RX ORDER — HEPARIN SODIUM 5000 [USP'U]/ML
5000 INJECTION, SOLUTION INTRAVENOUS; SUBCUTANEOUS EVERY 8 HOURS SCHEDULED
Status: DISCONTINUED | OUTPATIENT
Start: 2021-07-28 | End: 2021-08-02 | Stop reason: HOSPADM

## 2021-07-27 RX ORDER — LABETALOL 20 MG/4 ML (5 MG/ML) INTRAVENOUS SYRINGE
10
Status: DISCONTINUED | OUTPATIENT
Start: 2021-07-27 | End: 2021-07-27 | Stop reason: HOSPADM

## 2021-07-27 RX ORDER — MAGNESIUM HYDROXIDE 1200 MG/15ML
LIQUID ORAL AS NEEDED
Status: DISCONTINUED | OUTPATIENT
Start: 2021-07-27 | End: 2021-07-27 | Stop reason: HOSPADM

## 2021-07-27 RX ORDER — SODIUM CHLORIDE, SODIUM LACTATE, POTASSIUM CHLORIDE, CALCIUM CHLORIDE 600; 310; 30; 20 MG/100ML; MG/100ML; MG/100ML; MG/100ML
100 INJECTION, SOLUTION INTRAVENOUS CONTINUOUS
Status: DISCONTINUED | OUTPATIENT
Start: 2021-07-27 | End: 2021-07-27

## 2021-07-27 RX ORDER — HYDROMORPHONE HCL/PF 1 MG/ML
0.5 SYRINGE (ML) INJECTION
Status: DISCONTINUED | OUTPATIENT
Start: 2021-07-27 | End: 2021-07-27 | Stop reason: HOSPADM

## 2021-07-27 RX ORDER — PROMETHAZINE HYDROCHLORIDE 25 MG/ML
12.5 INJECTION, SOLUTION INTRAMUSCULAR; INTRAVENOUS ONCE AS NEEDED
Status: DISCONTINUED | OUTPATIENT
Start: 2021-07-27 | End: 2021-07-27 | Stop reason: HOSPADM

## 2021-07-27 RX ORDER — ALBUMIN, HUMAN INJ 5% 5 %
SOLUTION INTRAVENOUS CONTINUOUS PRN
Status: DISCONTINUED | OUTPATIENT
Start: 2021-07-27 | End: 2021-07-27

## 2021-07-27 RX ORDER — BUPIVACAINE HYDROCHLORIDE 5 MG/ML
INJECTION, SOLUTION PERINEURAL
Status: COMPLETED | OUTPATIENT
Start: 2021-07-27 | End: 2021-07-27

## 2021-07-27 RX ORDER — MIDAZOLAM HYDROCHLORIDE 2 MG/2ML
INJECTION, SOLUTION INTRAMUSCULAR; INTRAVENOUS AS NEEDED
Status: DISCONTINUED | OUTPATIENT
Start: 2021-07-27 | End: 2021-07-27

## 2021-07-27 RX ORDER — DEXAMETHASONE SODIUM PHOSPHATE 10 MG/ML
INJECTION, SOLUTION INTRAMUSCULAR; INTRAVENOUS AS NEEDED
Status: DISCONTINUED | OUTPATIENT
Start: 2021-07-27 | End: 2021-07-27

## 2021-07-27 RX ORDER — GLYCOPYRROLATE 0.2 MG/ML
INJECTION INTRAMUSCULAR; INTRAVENOUS AS NEEDED
Status: DISCONTINUED | OUTPATIENT
Start: 2021-07-27 | End: 2021-07-27

## 2021-07-27 RX ORDER — SODIUM CHLORIDE, SODIUM LACTATE, POTASSIUM CHLORIDE, CALCIUM CHLORIDE 600; 310; 30; 20 MG/100ML; MG/100ML; MG/100ML; MG/100ML
INJECTION, SOLUTION INTRAVENOUS CONTINUOUS PRN
Status: DISCONTINUED | OUTPATIENT
Start: 2021-07-27 | End: 2021-07-27

## 2021-07-27 RX ORDER — HYDRALAZINE HYDROCHLORIDE 20 MG/ML
5 INJECTION INTRAMUSCULAR; INTRAVENOUS
Status: DISCONTINUED | OUTPATIENT
Start: 2021-07-27 | End: 2021-07-27 | Stop reason: HOSPADM

## 2021-07-27 RX ORDER — HYDROMORPHONE HCL 110MG/55ML
PATIENT CONTROLLED ANALGESIA SYRINGE INTRAVENOUS AS NEEDED
Status: DISCONTINUED | OUTPATIENT
Start: 2021-07-27 | End: 2021-07-27

## 2021-07-27 RX ORDER — PROPOFOL 10 MG/ML
INJECTION, EMULSION INTRAVENOUS AS NEEDED
Status: DISCONTINUED | OUTPATIENT
Start: 2021-07-27 | End: 2021-07-27

## 2021-07-27 RX ORDER — KETAMINE HCL IN NACL, ISO-OSM 100MG/10ML
SYRINGE (ML) INJECTION AS NEEDED
Status: DISCONTINUED | OUTPATIENT
Start: 2021-07-27 | End: 2021-07-27

## 2021-07-27 RX ORDER — ALBUTEROL SULFATE 2.5 MG/3ML
2.5 SOLUTION RESPIRATORY (INHALATION) ONCE AS NEEDED
Status: DISCONTINUED | OUTPATIENT
Start: 2021-07-27 | End: 2021-07-27 | Stop reason: HOSPADM

## 2021-07-27 RX ORDER — ONDANSETRON 2 MG/ML
4 INJECTION INTRAMUSCULAR; INTRAVENOUS ONCE AS NEEDED
Status: DISCONTINUED | OUTPATIENT
Start: 2021-07-27 | End: 2021-07-27 | Stop reason: HOSPADM

## 2021-07-27 RX ORDER — LIDOCAINE HYDROCHLORIDE 10 MG/ML
INJECTION, SOLUTION EPIDURAL; INFILTRATION; INTRACAUDAL; PERINEURAL AS NEEDED
Status: DISCONTINUED | OUTPATIENT
Start: 2021-07-27 | End: 2021-07-27

## 2021-07-27 RX ORDER — HYDROMORPHONE HCL IN WATER/PF 6 MG/30 ML
0.2 PATIENT CONTROLLED ANALGESIA SYRINGE INTRAVENOUS
Status: DISCONTINUED | OUTPATIENT
Start: 2021-07-27 | End: 2021-07-27 | Stop reason: HOSPADM

## 2021-07-27 RX ORDER — ONDANSETRON 2 MG/ML
INJECTION INTRAMUSCULAR; INTRAVENOUS AS NEEDED
Status: DISCONTINUED | OUTPATIENT
Start: 2021-07-27 | End: 2021-07-27

## 2021-07-27 RX ORDER — ROCURONIUM BROMIDE 10 MG/ML
INJECTION, SOLUTION INTRAVENOUS AS NEEDED
Status: DISCONTINUED | OUTPATIENT
Start: 2021-07-27 | End: 2021-07-27

## 2021-07-27 RX ORDER — CEFAZOLIN SODIUM 1 G/3ML
INJECTION, POWDER, FOR SOLUTION INTRAMUSCULAR; INTRAVENOUS AS NEEDED
Status: DISCONTINUED | OUTPATIENT
Start: 2021-07-27 | End: 2021-07-27

## 2021-07-27 RX ORDER — FENTANYL CITRATE 50 UG/ML
INJECTION, SOLUTION INTRAMUSCULAR; INTRAVENOUS AS NEEDED
Status: DISCONTINUED | OUTPATIENT
Start: 2021-07-27 | End: 2021-07-27

## 2021-07-27 RX ORDER — SODIUM CHLORIDE 9 MG/ML
INJECTION, SOLUTION INTRAVENOUS CONTINUOUS PRN
Status: DISCONTINUED | OUTPATIENT
Start: 2021-07-27 | End: 2021-07-27

## 2021-07-27 RX ORDER — CEFAZOLIN SODIUM 2 G/50ML
2000 SOLUTION INTRAVENOUS ONCE
Status: DISCONTINUED | OUTPATIENT
Start: 2021-07-27 | End: 2021-07-28

## 2021-07-27 RX ORDER — LORAZEPAM 2 MG/ML
1 INJECTION INTRAMUSCULAR
Status: DISCONTINUED | OUTPATIENT
Start: 2021-07-27 | End: 2021-07-27 | Stop reason: HOSPADM

## 2021-07-27 RX ORDER — METOCLOPRAMIDE HYDROCHLORIDE 5 MG/ML
10 INJECTION INTRAMUSCULAR; INTRAVENOUS ONCE AS NEEDED
Status: DISCONTINUED | OUTPATIENT
Start: 2021-07-27 | End: 2021-07-27 | Stop reason: HOSPADM

## 2021-07-27 RX ADMIN — CEFAZOLIN 2000 MG: 1 INJECTION, POWDER, FOR SOLUTION INTRAMUSCULAR; INTRAVENOUS at 16:49

## 2021-07-27 RX ADMIN — METHYLPREDNISOLONE SODIUM SUCCINATE 20 MG: 40 INJECTION, POWDER, FOR SOLUTION INTRAMUSCULAR; INTRAVENOUS at 21:42

## 2021-07-27 RX ADMIN — FENTANYL CITRATE 50 MCG: 50 INJECTION INTRAMUSCULAR; INTRAVENOUS at 17:10

## 2021-07-27 RX ADMIN — SODIUM CHLORIDE, SODIUM LACTATE, POTASSIUM CHLORIDE, AND CALCIUM CHLORIDE 125 ML/HR: .6; .31; .03; .02 INJECTION, SOLUTION INTRAVENOUS at 20:27

## 2021-07-27 RX ADMIN — METHYLPREDNISOLONE SODIUM SUCCINATE 20 MG: 40 INJECTION, POWDER, FOR SOLUTION INTRAMUSCULAR; INTRAVENOUS at 06:32

## 2021-07-27 RX ADMIN — SODIUM CHLORIDE: 0.9 INJECTION, SOLUTION INTRAVENOUS at 16:40

## 2021-07-27 RX ADMIN — FENTANYL CITRATE 100 MCG: 50 INJECTION INTRAMUSCULAR; INTRAVENOUS at 16:30

## 2021-07-27 RX ADMIN — Medication 50 MG: at 16:30

## 2021-07-27 RX ADMIN — METRONIDAZOLE 500 MG: 500 INJECTION, SOLUTION INTRAVENOUS at 16:48

## 2021-07-27 RX ADMIN — BUPIVACAINE HYDROCHLORIDE 20 ML: 5 INJECTION, SOLUTION PERINEURAL at 19:35

## 2021-07-27 RX ADMIN — ROCURONIUM BROMIDE 50 MG: 50 INJECTION, SOLUTION INTRAVENOUS at 16:30

## 2021-07-27 RX ADMIN — THIAMINE HYDROCHLORIDE: 100 INJECTION, SOLUTION INTRAMUSCULAR; INTRAVENOUS at 22:01

## 2021-07-27 RX ADMIN — METHYLPREDNISOLONE SODIUM SUCCINATE 20 MG: 40 INJECTION, POWDER, FOR SOLUTION INTRAMUSCULAR; INTRAVENOUS at 13:20

## 2021-07-27 RX ADMIN — DEXTROSE, SODIUM CHLORIDE, AND POTASSIUM CHLORIDE 75 ML/HR: 5; .45; .15 INJECTION INTRAVENOUS at 06:36

## 2021-07-27 RX ADMIN — SUGAMMADEX 193 MG: 100 INJECTION, SOLUTION INTRAVENOUS at 19:32

## 2021-07-27 RX ADMIN — ALBUMIN (HUMAN): 12.5 INJECTION, SOLUTION INTRAVENOUS at 19:20

## 2021-07-27 RX ADMIN — LIDOCAINE HYDROCHLORIDE 50 MG: 10 INJECTION, SOLUTION EPIDURAL; INFILTRATION; INTRACAUDAL; PERINEURAL at 16:30

## 2021-07-27 RX ADMIN — HYDROMORPHONE HYDROCHLORIDE 1 MG: 2 INJECTION, SOLUTION INTRAMUSCULAR; INTRAVENOUS; SUBCUTANEOUS at 19:06

## 2021-07-27 RX ADMIN — ROCURONIUM BROMIDE 50 MG: 50 INJECTION, SOLUTION INTRAVENOUS at 17:12

## 2021-07-27 RX ADMIN — PANTOPRAZOLE SODIUM 40 MG: 40 TABLET, DELAYED RELEASE ORAL at 06:32

## 2021-07-27 RX ADMIN — PROPOFOL 100 MG: 10 INJECTION, EMULSION INTRAVENOUS at 16:30

## 2021-07-27 RX ADMIN — BUPIVACAINE 20 ML: 13.3 INJECTION, SUSPENSION, LIPOSOMAL INFILTRATION at 19:35

## 2021-07-27 RX ADMIN — ONDANSETRON 4 MG: 2 INJECTION INTRAMUSCULAR; INTRAVENOUS at 17:13

## 2021-07-27 RX ADMIN — HYDROMORPHONE HYDROCHLORIDE 1 MG: 2 INJECTION, SOLUTION INTRAMUSCULAR; INTRAVENOUS; SUBCUTANEOUS at 17:08

## 2021-07-27 RX ADMIN — ROCURONIUM BROMIDE 20 MG: 50 INJECTION, SOLUTION INTRAVENOUS at 18:09

## 2021-07-27 RX ADMIN — MIDAZOLAM 2 MG: 1 INJECTION INTRAMUSCULAR; INTRAVENOUS at 16:20

## 2021-07-27 RX ADMIN — ENOXAPARIN SODIUM 40 MG: 40 INJECTION SUBCUTANEOUS at 09:32

## 2021-07-27 RX ADMIN — SODIUM CHLORIDE, SODIUM LACTATE, POTASSIUM CHLORIDE, AND CALCIUM CHLORIDE: .6; .31; .03; .02 INJECTION, SOLUTION INTRAVENOUS at 17:15

## 2021-07-27 RX ADMIN — FENTANYL CITRATE 50 MCG: 50 INJECTION INTRAMUSCULAR; INTRAVENOUS at 18:04

## 2021-07-27 RX ADMIN — ALBUMIN (HUMAN): 12.5 INJECTION, SOLUTION INTRAVENOUS at 16:20

## 2021-07-27 RX ADMIN — Medication: at 20:15

## 2021-07-27 RX ADMIN — HYDROMORPHONE HYDROCHLORIDE 0.5 MG: 2 INJECTION, SOLUTION INTRAMUSCULAR; INTRAVENOUS; SUBCUTANEOUS at 19:31

## 2021-07-27 RX ADMIN — DEXAMETHASONE SODIUM PHOSPHATE 10 MG: 10 INJECTION, SOLUTION INTRAMUSCULAR; INTRAVENOUS at 17:13

## 2021-07-27 RX ADMIN — DEXMEDETOMIDINE 0.4 MCG/KG/HR: 100 INJECTION, SOLUTION, CONCENTRATE INTRAVENOUS at 17:05

## 2021-07-27 RX ADMIN — GLYCOPYRROLATE 0.2 MG: 0.2 INJECTION, SOLUTION INTRAMUSCULAR; INTRAVENOUS at 17:08

## 2021-07-27 NOTE — ANESTHESIA PREPROCEDURE EVALUATION
Procedure:  RESECTION COLON LEFT LAPAROSCOPIC;  LAPAROSCOPIC PROCTOCOLECTOMY (N/A Abdomen)    Relevant Problems   GI/HEPATIC   (+) Rectal bleeding      HEMATOLOGY   (+) ABLA (acute blood loss anemia)   (+) Iron deficiency anemia        Physical Exam    Airway    Mallampati score: I  TM Distance: >3 FB  Neck ROM: full     Dental   No notable dental hx     Cardiovascular  Cardiovascular exam normal    Pulmonary  Pulmonary exam normal     Other Findings        Anesthesia Plan  ASA Score- 2     Anesthesia Type- general and regional with ASA Monitors  Additional Monitors:   Airway Plan: ETT  Plan Factors-Exercise tolerance (METS): >4 METS  Chart reviewed  Existing labs reviewed  Patient summary reviewed  Patient is not a current smoker  Patient did not smoke on day of surgery  Induction- intravenous  Postoperative Plan- Plan for postoperative opioid use  Planned trial extubation    Informed Consent- Anesthetic plan and risks discussed with patient  I personally reviewed this patient with the CRNA  Discussed and agreed on the Anesthesia Plan with the CRNA  Vicki Tello

## 2021-07-27 NOTE — ANESTHESIA PROCEDURE NOTES
Peripheral Block    Patient location during procedure: OR  Start time: 7/27/2021 7:35 PM  Reason for block: at surgeon's request and post-op pain management  Staffing  Performed: anesthesiologist   Anesthesiologist: Mariangel Israel MD  Preanesthetic Checklist  Completed: patient identified, IV checked, site marked, risks and benefits discussed, surgical consent, monitors and equipment checked, pre-op evaluation and timeout performed  Peripheral Block  Patient position: supine  Prep: ChloraPrep  Patient monitoring: continuous pulse ox and frequent blood pressure checks  Block type: TAP  Laterality: bilateral  Injection technique: single-shot  Procedures: ultrasound guided, Ultrasound guidance required for the procedure to increase accuracy and safety of medication placement and decrease risk of complications    Ultrasound permanent image savedbupivacaine (MARCAINE) 0 5 % perineural infiltration, 20 mL  Needle  Needle type: short-bevel   Needle gauge: 22 G  Needle length: 10 cm  Needle localization: ultrasound guidance  Test dose: negative  Assessment  Injection assessment: incremental injection, local visualized surrounding nerve on ultrasound and negative aspiration for heme  Paresthesia pain: none  Heart rate change: no  Slow fractionated injection: yes  Post-procedure:  site cleaned  patient tolerated the procedure well with no immediate complications

## 2021-07-27 NOTE — PROGRESS NOTES
Progress Note - Colorectal   Eusebia Torres 24 y o  male MRN: [id number]  Unit/Bed#: [facility] 816-01 Encounter: [id number]      Objective:  Patient feels totally wiped out, just had the commode had full of dark burgundy colored liquid stool  Denies nausea, no emesis  Having crampy abdominal pain  Was on a clear liquid diet  Has been NPO since midnight in case of going to the operating room tonight  Patient states he is weak  Last hemoglobin was 8 5 yesterday  His C reactive protein yesterday was 18 5 down from 30 2  Patient is voiding well on his own but nothing is documented  800 stool    Blood pressure 101/64, pulse 101, temperature 98 2 °F (36 8 °C), resp  rate 20, height 6' 3" (1 905 m), weight 96 4 kg (212 lb 8 4 oz), SpO2 97 %  ,Body mass index is 26 56 kg/m²  Intake/Output Summary (Last 24 hours) at 7/27/2021 0531  Last data filed at 7/27/2021 0234  Gross per 24 hour   Intake --   Output 800 ml   Net -800 ml       Invasive Devices     Peripherally Inserted Central Catheter Line            PICC Line 85/08/78 Right Basilic 5 days                Physical Exam:   Abdomen:  Is soft, does not appear distended, nontender, no guarding, no  stoma marking  Extremities: There is no calf tenderness bilaterally      Lab, Imaging and other studies:  Pending  VTE Pharmacologic Prophylaxis:  Rectal bleed  VTE Mechanical Prophylaxis: sequential compression device    Assessment:  25 y/o M p/w Ulcerative Colitis, acute blood loss anemia    Plan:  1  Continue TPN firm nutrition  2  More accurate II/O's  3  Follow-up labs  4  Stoma marking for possible OR  5   Continue GI recommendations

## 2021-07-27 NOTE — ANESTHESIA PROCEDURE NOTES
Arterial Line Insertion  Performed by: Kiki Lawson CRNA  Authorized by: Lubna Westbrook MD   Consent: Verbal consent obtained  Risks and benefits: risks, benefits and alternatives were discussed  Consent given by: patient  Patient understanding: patient states understanding of the procedure being performed  Patient identity confirmed: verbally with patient, arm band and hospital-assigned identification number  Preparation: Patient was prepped and draped in the usual sterile fashion    Indications: multiple ABGs and hemodynamic monitoring  Orientation:  Right  Location: radial artery  Procedure Details:  Niall's test normal: yes  Needle gauge: 20  Seldinger technique: Seldinger technique used  Number of attempts: 1    Post-procedure:  Waveform: good waveform, poor waveform and waveform confirmed  Post-procedure CNS: unchanged

## 2021-07-27 NOTE — PROGRESS NOTES
[address] [facility]  Progress Note - Freda Osullivan 2000, 24 y o  male MRN: [id number]  Unit/Bed#: [facility] 816-01 Encounter: [id number]  Primary Care Provider: Piedad Santos DO   Date and time admitted to hospital: 7/15/2021  9:24 PM    * Colitis, ulcerative ([facility] 75 )  Assessment & Plan  · Patient was recently diagnosed with ulcerative colitis, was being treated with mesalamine and Remicade as outpatient  Received 2 doses without much improvement  Presented with syncopal episode  · CT abdomen pelvis showed worsening colitis  · GI following, on intravenous steroids and PPI  · s/p flex sig on 7/20 - severe colitis  Biopsies taken to rule out CMV - CMV neg in Biopsy, cultures negative  · Repeat CRP, was unchanged; now down to 30  · remicaid infusion (higher dose) 10 mg/kg given on 7/21  · Colorectal surgery following - do he seems to be doing better, CRP better, bowel movements smaller but still bloody, not requiring transfusion in last 2-3 days he might be turning around the corner but given his prolonged course there are chances that this may get worse up can - hence Colorectal planning to re-evaluate him in the morning and consider him for OR later in the day  · GI also following  · Nutrition recs appreciated for TPN  · Follows with Dr Roslyn Lake as outpatient  · Given the high risk of DVT/PE in acute UC flare, GI recommends to continue lovenox, d/w patient he agrees  · For OR today by colorectal surgery - Laparoscopic subtotal versus total proctocolectomy this afternoon    Severe protein-calorie malnutrition ([facility] 75 )  Assessment & Plan  Malnutrition Findings:   Adult Malnutrition type: Acute illness (Related to Ulcerative colitis as evidenced by 10% weight loss over the past month and <50% energy intake needs met >5 days treated with TPN )  Adult Degree of Malnutrition: Other severe protein calorie malnutrition    BMI Findings: Body mass index is 26 56 kg/m²         ABLA (acute blood loss anemia)  Assessment & Plan  · Acute blood loss anemia in the setting of ulcer colitis flare with bloody BMs as well as iron deficiency anemia  · 1 unit given 7/18, hemoglobin upto 7 8 then downtrending  · Mother very concerned about PRBC & IV iron d/t hemochromatosis & frustrated that underlying condition is not being taken care of  · She wants only GI to decide about iron & PRBC  · 2nd PRBC on 7/22 - hb 6 2 -> 7 0, will give another 1 unit 7/23  · Hb now stable to better    Transaminitis  Assessment & Plan  · Elevated ALT, possibly due to blood loss  · MRI/ MRCP showed normal MRCP with heavy metal deposition in the liver, consistent with hemochromatosis which is likely 2/2 recent iron/blood transfusions  Iron panel not consistent with overload  · Hepatitis B and C, mitochondrial and smooth antibodies negative  · hemochromatosis mutation negative    Iron deficiency anemia  Assessment & Plan  · Continue PO iron supplementation  · S/p IV venofer by GI        VTE Pharmacologic Prophylaxis:   VTE Score: 0 Moderate Risk (Score 3-4) - Pharmacological DVT Prophylaxis Ordered: Enoxaparin (Lovenox)      Mechanical VTE Prophylaxis in Place: Yes     Patient Centered Rounds: I have performed bedside rounds with nursing staff today      Discussions with Specialists or Other Care Team Provider:  Colorectal surgery      Education and Discussions with Family / Patient: Updated  (mother) at bedside      Time Spent for Care: 30 minutes  More than 50% of total time spent on counseling and coordination of care as described above      Current Length of Stay: 12 day(s)  Current Patient Status: Inpatient      Certification Statement: The patient will continue to require additional inpatient hospital stay due to Monitoring response and planning possible surgery     Discharge Plan / Estimated Discharge Date: Unclear     Code Status: Level 1 - Full Code    Subjective:   Still has bloody BMs    No chest pain or dypnea    Objective:     Vitals:   Temp (24hrs), Av 2 °F (36 8 °C), Min:98 1 °F (36 7 °C), Max:98 2 °F (36 8 °C)    Temp:  [98 1 °F (36 7 °C)-98 2 °F (36 8 °C)] 98 1 °F (36 7 °C)  HR:  [] 95  Resp:  [18] 18  BP: (101-103)/(64) 103/64  SpO2:  [97 %-98 %] 98 %  Body mass index is 26 56 kg/m²  Input and Output Summary (last 24 hours): Intake/Output Summary (Last 24 hours) at 2021 1424  Last data filed at 2021 1200  Gross per 24 hour   Intake 0 ml   Output 400 ml   Net -400 ml       Physical Exam:    Vitals reviewed  General - alert and awake not in acute distress   Cardiovascular:      Rate and Rhythm: Normal rate and regular rhythm  Heart sounds: Normal heart sounds  Pulmonary:      Effort: Pulmonary effort is normal  No respiratory distress  Breath sounds: Normal breath sounds  No wheezing  Abdominal:      General: There is no distension  Palpations: Abdomen is soft  Tenderness: There is no abdominal tenderness  Neurological:      Mental Status: He is alert and oriented to person, place, and time  No focal deficits    Additional Data:     Labs:  Results from last 7 days   Lab Units 21  0618 21  0458 21  0519   WBC Thousand/uL 8 61  --  6 69   HEMOGLOBIN g/dL 7 9*  --  6 2*   HEMATOCRIT % 26 0*  --  21 1*   PLATELETS Thousands/uL 222  --  256   BANDS PCT %  --   --  8   NEUTROS PCT % 47   < >  --    LYMPHS PCT % 35   < >  --    LYMPHO PCT %  --   --  26   MONOS PCT % 14*   < >  --    MONO PCT %  --   --  14*   EOS PCT % 2   < > 2    < > = values in this interval not displayed       Results from last 7 days   Lab Units 21  0618 21  0638   SODIUM mmol/L 137 139   POTASSIUM mmol/L 2 9* 3 2*   CHLORIDE mmol/L 107 106   CO2 mmol/L 26 26   BUN mg/dL 11 10   CREATININE mg/dL 0 50* 0 46*   ANION GAP mmol/L 4 7   CALCIUM mg/dL 8 3 8 2*   ALBUMIN g/dL  --  2 1*   TOTAL BILIRUBIN mg/dL  --  0 42   ALK PHOS U/L  --  68   ALT U/L  --  82*   AST U/L  --  19   GLUCOSE RANDOM mg/dL 109 88     Results from last 7 days   Lab Units 07/27/21  0618   INR  1 17     Results from last 7 days   Lab Units 07/27/21  1118 07/27/21  0632 07/26/21  2331 07/26/21  1830 07/26/21  1213 07/26/21  0632 07/26/21  0010 07/25/21  1742 07/25/21  1134 07/25/21  0620 07/24/21  2345 07/24/21  1754   POC GLUCOSE mg/dl 126 118 129 137 129 116 107 129 131 129 130 131               Lines/Drains:  Invasive Devices     Peripherally Inserted Central Catheter Line            PICC Line 20/12/17 Right Basilic 5 days                Central Line:  Goal for removal:              Imaging:     Recent Cultures (last 7 days):   Results from last 7 days   Lab Units 07/23/21  1734   C DIFF TOXIN B BY PCR  Negative       Last 24 Hours Medication List:   Current Facility-Administered Medications   Medication Dose Route Frequency Provider Last Rate    acetaminophen  650 mg Oral Q4H PRN Castro Vasquez MD      Adult TPN (CUSTOM BASE/CUSTOM ELECTROLYTE)   Intravenous Continuous TPN Bereket Higginbotham PA-C 117 2 mL/hr at 07/26/21 2101    Adult TPN (CUSTOM BASE/CUSTOM ELECTROLYTE)   Intravenous Continuous TPN Jonah Chaney MD      cefazolin  2,000 mg Intravenous Once Nikole Andino MD      dextrose 5 % and sodium chloride 0 45 % with KCl 20 mEq/L  75 mL/hr Intravenous Continuous Demetrice Thayer MD 75 mL/hr (07/27/21 0636)    enoxaparin  40 mg Subcutaneous Q24H [address] Puma Zambrano MD      insulin lispro  1-6 Units Subcutaneous Q6H [address] Aspen Hamlin PA-C      methylPREDNISolone sodium succinate  20 mg Intravenous [facility] Castro Vasquez MD      metroNIDAZOLE  500 mg Intravenous Once Nikole Andino MD      ondansetron  4 mg Intravenous Q4H PRN Castro Vasquez MD      pantoprazole  40 mg Oral Early Morning Castro Vasquez MD      saccharomyces boulardii  250 mg Oral BID Castro Vasquez MD          Today, Patient Was Seen By: Jonah Chaney MD    **Please Note: This note may have been constructed using a voice recognition system  **

## 2021-07-27 NOTE — ASSESSMENT & PLAN NOTE
· Patient was recently diagnosed with ulcerative colitis, was being treated with mesalamine and Remicade as outpatient  Received 2 doses without much improvement  Presented with syncopal episode  · CT abdomen pelvis showed worsening colitis  · GI following, on intravenous steroids and PPI  · s/p flex sig on 7/20 - severe colitis   Biopsies taken to rule out CMV - CMV neg in Biopsy, cultures negative  · Repeat CRP, was unchanged; now down to 30  · remicaid infusion (higher dose) 10 mg/kg given on 7/21  · Colorectal surgery following - do he seems to be doing better, CRP better, bowel movements smaller but still bloody, not requiring transfusion in last 2-3 days he might be turning around the corner but given his prolonged course there are chances that this may get worse up can - hence Colorectal planning to re-evaluate him in the morning and consider him for OR later in the day  · GI also following  · Nutrition recs appreciated for TPN  · Follows with Dr Luis Daniel Ling as outpatient  · Given the high risk of DVT/PE in acute UC flare, GI recommends to continue lovenox, d/w patient he agrees  · For OR today by colorectal surgery - Laparoscopic subtotal versus total proctocolectomy this afternoon

## 2021-07-27 NOTE — OP NOTE
OPERATIVE REPORT  PATIENT NAME: Pattie Collet    :  2000  MRN: [id number]  Pt Location: BE OR ROOM 10    SURGERY DATE: 2021    Surgeon(s) and Role:     * Roger Nichols MD - Primary     * Emily Mazariegos MD - Assisting    Preop Diagnosis:  Severe, medical refractory ulcerative colitis    Postop diagnosis:  Severe, medical refractory ulcerative colitis    Procedure(s) (LRB):  Diagnostic laparoscopy  LAPAROSCOPIC Hand Assist subtotal colectomy  End ileostomy    Specimen(s):  ID Type Source Tests Collected by Time Destination   1 : Subtotal Colectomy  Tissue Colon TISSUE EXAM Roger Nichols MD 2021 181      Estimated Blood Loss:   100 mL    Drains:  Closed/Suction Drain Left LLQ Bulb 19 Fr  (Active)   Number of days: 0       Ileostomy RLQ (Active)   Number of days: 0       Urethral Catheter (Active)   Number of days: 0     Anesthesia Type:   General    Operative Indications:  Ulcerative colitis, severe, medical refractory, blood transfusion requirement    Operative Findings:  -pancolitis with burned out serosa sigmoid extending to proximal transverse colon  -subtotal colectomy, right lower quadrant end ileostomy, left lower quadrant MEGHANA drain looped in pelvis    Complications:   None    Procedure and Technique:  22yo male long  hospital course with severe ulcerative colitis despite multiple treatments tried, Remicade, steroids, ongoing bleeding and transfusion requirement despite CRP response  We discussed Laparoscopic possible open subtotal versus total proctocolectomy with end ileostomy,in a face-to-face, personal, informed consent process, the benefits, alternatives, risks including not limited to bleeding, infection, risks of anesthesia, open surgery, DVT/PE, heart attack, stroke, death, damage to local structures(e g  ureter, duodenum),anastomotic leak requiring reoperation, temporary versus permanent stoma   They understood these risks, signed informed consent,  and wish to proceed  He was brought to the operating room where general endotracheal anesthesia was induced without event  Mary was placed under sterile conditions  He was placed in modified lithotomy position with attention to joints and bony extremities  Lovenox was current, Venodynes were on and running throughout the procedure, received cefazolin and Flagyl prior to skin incision      He was electric skin clipped, chlorhexidine sterile prepped and after appropriate drying time Ioban and sterile draped  After timeout was taken per protocol procedure began with 8 cm suprapubic incision for GelPort, incising through the subcutaneous tissues, muscle splitting through midline fascia until peritoneal cavity was entered, 5 mm port was placed through the gel to insufflate 15 mm CO2 pneumoperitoneum  Total of 3x 5 mm trocars were placed right lower quadrant through ileostomy marking, right upper quadrant, and left lower quadrant for eventual drain site  Using hand assistance and EnSeal energy device the sigmoid colon was medialized off of its lateral attachments until we were able to lift it and show the mesentery, sweeping down the retroperitoneal structures  Mesentery was incised and mediolateral dissection was undertaken sweeping down the retroperitoneal attachments including the visualized ureter  The entirety of the resection was taken mid mesentery, taking this proximally up to the splenic flexure, splenic flexure was medialized off of the omental colic attachments with no undue tension on the spleen or bleeding  The transverse colon continued to be mobilized off of the omentum until the hepatic flexure was encountered and switched sides of table and hand assist to continue proximally, mobilizing the hepatic flexure and taking down the lateral attachments until the entirety of the colon was mobilized and off of its blood supply      At this point the abdomen was desufflated and the remainder of the dissection was done through the extraction incision with the wound protector in place  The colon was brought out and the appendix and ileocolic attachments were dissected free prior to placing blue load of the contour at the terminal ileum to resect the colon off of this area with careful attention to preserve the entirety of its length and blood supply  The colon was taken in a sleeve resection on the right side here to maintain the entirety of the small bowel mesentery for potential eventual ileal J pouch, larger vessels were taken double ties  At this point attention was turned to the remainder of the sigmoid dissection, packing the bowel away and identifying bilateral ureter the rectum was taken down to the sacral promontory and peritoneal reflection given his proctitis, taking name vessels including the superior rectal artery between ties  Green load contour was placed here after Betadine irrigation of the remainder of the stump to prevent contamination if there was stump blowout  The stump was oversewn with 2-0 Vicryl suture to cover it entirely and imbricated, the 1 corner was placed with a blue Prolene for stump marking and the omentum was pexed down on top of this as well, 19 round drain was placed through the left lower quadrant trocar site, looped, secured with nylon  The right lower quadrant trocar site that was placed through his preferred ileostomy site was then removed after coring out circular paddle of subcutaneous tissue until the fascia was encountered and split in a cruciate fashion, muscle-splitting rectus sheath until 2 fingerbreadths were allowed, Rogenia Mars was used to bring the end ileostomy up and out ensuring no twist of the mesentery  Irrigation was undertaken and ran clean, gloves were changed and the fascia was closed with 1  PDS from both sides on the hand port incision, followed by 4-0 Monocryl on the skin and glue    Right upper quadrant remaining 5 mm port site was closed similarly  Ileostomy was then matured in a Salma fashion after removing the staple line and flipping a nice serosal bud with 4 quadrant 3-0 Vicryl sutures, multiple in-between simples for in mucocutaneous seal and appliance placement  All sponge needle instrument  /RF wand counts were correct I was present and scrubbed for the entirety of the procedure     I was present for the entire procedure    Patient Disposition:  PACU     SIGNATURE: Emanuel Causey MD  DATE: July 27, 2021  TIME: 7:33 PM

## 2021-07-27 NOTE — PLAN OF CARE
Problem: PAIN - ADULT  Goal: Verbalizes/displays adequate comfort level or baseline comfort level  Description: Interventions:  - Encourage patient to monitor pain and request assistance  - Assess pain using appropriate pain scale  - Administer analgesics based on type and severity of pain and evaluate response  - Implement non-pharmacological measures as appropriate and evaluate response  - Consider cultural and social influences on pain and pain management  - Notify physician/advanced practitioner if interventions unsuccessful or patient reports new pain  Outcome: Progressing     Problem: DISCHARGE PLANNING  Goal: Discharge to home or other facility with appropriate resources  Description: INTERVENTIONS:  - Identify barriers to discharge w/patient and caregiver  - Arrange for needed discharge resources and transportation as appropriate  - Identify discharge learning needs (meds, wound care, etc )  - Arrange for interpretive services to assist at discharge as needed  - Refer to Case Management Department for coordinating discharge planning if the patient needs post-hospital services based on physician/advanced practitioner order or complex needs related to functional status, cognitive ability, or social support system  Outcome: Progressing     Problem: Knowledge Deficit  Goal: Patient/family/caregiver demonstrates understanding of disease process, treatment plan, medications, and discharge instructions  Description: Complete learning assessment and assess knowledge base    Interventions:  - Provide teaching at level of understanding  - Provide teaching via preferred learning methods  Outcome: Progressing     Problem: Potential for Falls  Goal: Patient will remain free of falls  Description: INTERVENTIONS:  - Educate patient/family on patient safety including physical limitations  - Instruct patient to call for assistance with activity   - Consult OT/PT to assist with strengthening/mobility   - Keep Call bell within reach  - Keep bed low and locked with side rails adjusted as appropriate  - Keep care items and personal belongings within reach  - Initiate and maintain comfort rounds  - Apply yellow socks and bracelet for high fall risk patients  - Consider moving patient to room near nurses station  Outcome: Progressing      Problem: GASTROINTESTINAL - ADULT  Goal: Maintains or returns to baseline bowel function  Description: INTERVENTIONS:  - Assess bowel function  - Encourage oral fluids to ensure adequate hydration  - Administer IV fluids if ordered to ensure adequate hydration  - Administer ordered medications as needed  - Encourage mobilization and activity  - Consider nutritional services referral to assist patient with adequate nutrition and appropriate food choices  Outcome: Progressing  Goal: Maintains adequate nutritional intake  Description: INTERVENTIONS:  - Monitor percentage of each meal consumed  - Identify factors contributing to decreased intake, treat as appropriate  - Assist with meals as needed  - Monitor I&O, weight, and lab values if indicated  - Obtain nutrition services referral as needed  Outcome: Progressing        Problem: METABOLIC, FLUID AND ELECTROLYTES - ADULT  Goal: Electrolytes maintained within normal limits  Description: INTERVENTIONS:  - Monitor labs and assess patient for signs and symptoms of electrolyte imbalances  - Administer electrolyte replacement as ordered  - Monitor response to electrolyte replacements, including repeat lab results as appropriate  - Instruct patient on fluid and nutrition as appropriate  Outcome: Progressing       Problem: HEMATOLOGIC - ADULT  Goal: Maintains hematologic stability  Description: INTERVENTIONS  - Assess for signs and symptoms of bleeding or hemorrhage  - Monitor labs  - Administer supportive blood products/factors as ordered and appropriate  Outcome: Progressing       Problem: Nutrition/Hydration-ADULT  Goal: Nutrient/Hydration intake appropriate for improving, restoring or maintaining nutritional needs  Description: Monitor and assess patient's nutrition/hydration status for malnutrition  Collaborate with interdisciplinary team and initiate plan and interventions as ordered  Monitor patient's weight and dietary intake as ordered or per policy  Utilize nutrition screening tool and intervene as necessary  Determine patient's food preferences and provide high-protein, high-caloric foods as appropriate       INTERVENTIONS:  - Monitor oral intake, urinary output, labs, and treatment plans  - Assess nutrition and hydration status and recommend course of action  - Evaluate amount of meals eaten  - Assist patient with eating if necessary   - Allow adequate time for meals  - Recommend/ encourage appropriate diets, oral nutritional supplements, and vitamin/mineral supplements  - Order, calculate, and assess calorie counts as needed  - Recommend, monitor, and adjust tube feedings and TPN/PPN based on assessed needs  - Assess need for intravenous fluids  - Provide specific nutrition/hydration education as appropriate  - Include patient/family/caregiver in decisions related to nutrition  Outcome: Progressing

## 2021-07-27 NOTE — PROGRESS NOTES
Progress Note- Yasmin Roach 24 y o  male MRN: [id number]    Unit/Bed#: [facility] 668-47 Encounter: [id number]      Assessment and Plan:  25 y/o male with ulcerative colitis refractory to IV steroids on an accelerated Remicade schedule  Third dose of Remicade was on 7/21  CRP down trending  Yesterday was 30 2 --> 18 5 and today is 19 6  NPO since midnight  Colectomy scheduled for this afternoon  Pardonatalie Crum Ulcerative colitis refractory to steroids  · Colectomy this afternoon  · Continue TPN, IVF, steroid, PPI, DVT prophylaxis  · CRP 19 6  · Pending TPMT    Iron deficiency anemia  · Hg 7 9 today, 8 5 yesterday  · Continue to monitor Hg and transfuse if < 7 or symptomatic anemia    Protein-calorie malnutrition  · Continue TPN, IVF  · Potassium 2 9 - continue potassium repletion   · Monitor electrolytes    ______________________________________________________________________    Subjective:     Had 3 bloody bowel movements overnight  Was NPO starting at midnight to prepare for surgery this afternoon  Abdominal pain still present and unchanged          Medication Administration - last 24 hours from 07/26/2021 0858 to 07/27/2021 0858       Date/Time Order Dose Route Action Action by     07/27/2021 0632 pantoprazole (PROTONIX) EC tablet 40 mg 40 mg Oral Given Rk Miller RN     07/26/2021 1733 saccharomyces boulardii (FLORASTOR) capsule 250 mg 250 mg Oral Given Cathy Nugent RN     07/26/2021 8269 saccharomyces boulardii (FLORASTOR) capsule 250 mg 250 mg Oral Given Pita Toure RN     07/27/2021 1069 methylPREDNISolone sodium succinate (Solu-MEDROL) injection 20 mg 20 mg Intravenous Given Rk Miller RN     07/26/2021 2104 methylPREDNISolone sodium succinate (Solu-MEDROL) injection 20 mg 20 mg Intravenous Given Pita Toure RN     07/26/2021 1357 methylPREDNISolone sodium succinate (Solu-MEDROL) injection 20 mg 20 mg Intravenous Given Pita Toure RN     07/26/2021 0905 enoxaparin (LOVENOX) subcutaneous injection 40 mg 40 mg Subcutaneous Given Feroz Higginbotham RN     07/27/2021 0636 dextrose 5 % and sodium chloride 0 45 % with KCl 20 mEq/L infusion 75 mL/hr Intravenous [address] Zenaida Morgan [facility]     07/27/2021 7776 dextrose 5 % and sodium chloride 0 45 % with KCl 20 mEq/L infusion 0 mL/hr Intravenous Stopped Zenaida Morgan RN     07/26/2021 1728 dextrose 5 % and sodium chloride 0 45 % with KCl 20 mEq/L infusion 75 mL/hr Intravenous [address] Sheldon Thibodeaux RN     07/26/2021 1727 dextrose 5 % and sodium chloride 0 45 % with KCl 20 mEq/L infusion 0 mL/hr Intravenous Stopped Sheldon Thibodeaux RN     07/27/2021 0734 insulin lispro (HumaLOG) 100 units/mL subcutaneous injection 1-6 Units 1 Units Subcutaneous Not Given Zenaida Morgan RN     07/26/2021 2341 insulin lispro (HumaLOG) 100 units/mL subcutaneous injection 1-6 Units 1 Units Subcutaneous Not Given Lubna Alonso, FABIOLA     07/26/2021 1834 insulin lispro (HumaLOG) 100 units/mL subcutaneous injection 1-6 Units 1 Units Subcutaneous Not Given Sheldon Thibodeaux RN     07/26/2021 1221 insulin lispro (HumaLOG) 100 units/mL subcutaneous injection 1-6 Units 1 Units Subcutaneous Not Given Feroz Higginbotham RN     07/26/2021 1221 iron sucrose (VENOFER) 300 mg in sodium chloride 0 9 % 250 mL IVPB 0 mg Intravenous Stopped Feroz Higginbotham RN     07/26/2021 5444 iron sucrose (VENOFER) 300 mg in sodium chloride 0 9 % 250 mL IVPB 300 mg Intravenous [address] Feroz Higginbotham RN     07/26/2021 2109 Adult 3-in-1 TPN (custom base / custom electrolytes)   Intravenous Stopped Feroz Higginbotham RN     07/26/2021 2101 Adult 3-in-1 TPN (custom base / custom electrolytes)   Intravenous New [address] [facility] Feroz Higginbotham RN     07/26/2021 2218 neomycin (MYCIFRADIN) tablet 1,000 mg 1,000 mg Oral Given Feroz Higginbotham RN     07/26/2021 1454 neomycin (MYCIFRADIN) tablet 1,000 mg 1,000 mg Oral Given Feroz Higginbotham RN     07/26/2021 1356 neomycin (MYCIFRADIN) tablet 1,000 mg 1,000 mg Oral Given Ben Raphael Eduardo Villanueva, FABIOLA     07/26/2021 2218 metroNIDAZOLE (FLAGYL) tablet 500 mg 500 mg Oral Given Jyotsna Shanks, FABIOLA     07/26/2021 1454 metroNIDAZOLE (FLAGYL) tablet 500 mg 500 mg Oral Given Jyotsna Shanks, FABIOLA     07/26/2021 1356 metroNIDAZOLE (FLAGYL) tablet 500 mg 500 mg Oral Given Jyotsna Shanks RN          Objective:     Vitals: Blood pressure 103/64, pulse 95, temperature 98 1 °F (36 7 °C), resp  rate 18, height 6' 3" (1 905 m), weight 96 4 kg (212 lb 8 4 oz), SpO2 98 %  ,Body mass index is 26 56 kg/m²  Intake/Output Summary (Last 24 hours) at 7/27/2021 0858  Last data filed at 7/27/2021 0234  Gross per 24 hour   Intake --   Output 800 ml   Net -800 ml       Physical Exam:   General Appearance: Awake and alert, in no acute distress  Abdomen: Diffuse, mild tenderness, soft, non-distended; bowel sounds normal; no masses or no organomegaly    Invasive Devices     Peripherally Inserted Central Catheter Line            PICC Line 05/27/84 Right Basilic 5 days                Lab Results:  No results displayed because visit has over 200 results  Imaging Studies: I have personally reviewed pertinent imaging studies

## 2021-07-27 NOTE — WOUND OSTOMY CARE
Progress Note- Ostomy  Shine Mccarthy 24 y o  male  [id number]  [facility] 816-[facility] 816-01        Assessment:  Patient see this morning for stoma site marking, he is in bed somnolent with mom at bed side asking questions  Patients abdomen assessed in three different position, supine, sitting and standing, rectus indemnified via palpation while patient coughing  No scar or skin integrity issues seen, patient indicating he wears his pants low which limited ability of marking low  After careful assessment of body habitus, two sites were marked to his R abdomen as most ideal location for stoma placement  Patient and mom made aware final stoma placement to be decided by surgeon during OR time  Both are receptive for wound care nurses return for continued teaching    Plan: We will patient post op for ostomy care and teaching  Vitals:    07/27/21 0828   BP: 103/64   Pulse: 95   Resp: 18   Temp: 98 1 °F (36 7 °C)   SpO2: 98%         We will see patient post op to start teaching        Evita Guerra RN, BSN, [employer]

## 2021-07-28 LAB
ABO GROUP BLD BPU: NORMAL
ABO GROUP BLD BPU: NORMAL
ALBUMIN SERPL BCP-MCNC: 2.3 G/DL (ref 3.5–5)
ALP SERPL-CCNC: 54 U/L (ref 46–116)
ALT SERPL W P-5'-P-CCNC: 42 U/L (ref 12–78)
ANION GAP SERPL CALCULATED.3IONS-SCNC: 4 MMOL/L (ref 4–13)
ANISOCYTOSIS BLD QL SMEAR: PRESENT
AST SERPL W P-5'-P-CCNC: 9 U/L (ref 5–45)
BASOPHILS # BLD MANUAL: 0 THOUSAND/UL (ref 0–0.1)
BASOPHILS NFR MAR MANUAL: 0 % (ref 0–1)
BILIRUB SERPL-MCNC: 0.57 MG/DL (ref 0.2–1)
BPU ID: NORMAL
BPU ID: NORMAL
BUN SERPL-MCNC: 7 MG/DL (ref 5–25)
CALCIUM ALBUM COR SERPL-MCNC: 10 MG/DL (ref 8.3–10.1)
CALCIUM SERPL-MCNC: 8.6 MG/DL (ref 8.3–10.1)
CHLORIDE SERPL-SCNC: 104 MMOL/L (ref 100–108)
CO2 SERPL-SCNC: 28 MMOL/L (ref 21–32)
CREAT SERPL-MCNC: 0.44 MG/DL (ref 0.6–1.3)
CROSSMATCH: NORMAL
CROSSMATCH: NORMAL
CRP SERPL QL: 20.3 MG/L
EOSINOPHIL # BLD MANUAL: 0 THOUSAND/UL (ref 0–0.4)
EOSINOPHIL NFR BLD MANUAL: 0 % (ref 0–6)
ERYTHROCYTE [DISTWIDTH] IN BLOOD BY AUTOMATED COUNT: 18.1 % (ref 11.6–15.1)
GFR SERPL CREATININE-BSD FRML MDRD: 163 ML/MIN/1.73SQ M
GLUCOSE SERPL-MCNC: 121 MG/DL (ref 65–140)
GLUCOSE SERPL-MCNC: 123 MG/DL (ref 65–140)
GLUCOSE SERPL-MCNC: 136 MG/DL (ref 65–140)
GLUCOSE SERPL-MCNC: 140 MG/DL (ref 65–140)
GLUCOSE SERPL-MCNC: 143 MG/DL (ref 65–140)
HCT VFR BLD AUTO: 27.7 % (ref 36.5–49.3)
HGB BLD-MCNC: 8.8 G/DL (ref 12–17)
LYMPHOCYTES # BLD AUTO: 0.41 THOUSAND/UL (ref 0.6–4.47)
LYMPHOCYTES # BLD AUTO: 5 % (ref 14–44)
MAGNESIUM SERPL-MCNC: 2 MG/DL (ref 1.6–2.6)
MCH RBC QN AUTO: 28.2 PG (ref 26.8–34.3)
MCHC RBC AUTO-ENTMCNC: 31.8 G/DL (ref 31.4–37.4)
MCV RBC AUTO: 89 FL (ref 82–98)
MICROCYTES BLD QL AUTO: PRESENT
MONOCYTES # BLD AUTO: 0.5 THOUSAND/UL (ref 0–1.22)
MONOCYTES NFR BLD: 6 % (ref 4–12)
NEUTROPHILS # BLD MANUAL: 7.04 THOUSAND/UL (ref 1.85–7.62)
NEUTS BAND NFR BLD MANUAL: 3 % (ref 0–8)
NEUTS SEG NFR BLD AUTO: 82 % (ref 43–75)
NRBC BLD AUTO-RTO: 1 /100 WBCS
PHOSPHATE SERPL-MCNC: 3.2 MG/DL (ref 2.7–4.5)
PLATELET # BLD AUTO: 194 THOUSANDS/UL (ref 149–390)
PLATELET BLD QL SMEAR: ADEQUATE
PMV BLD AUTO: 11.2 FL (ref 8.9–12.7)
POLYCHROMASIA BLD QL SMEAR: PRESENT
POTASSIUM SERPL-SCNC: 3.6 MMOL/L (ref 3.5–5.3)
PROT SERPL-MCNC: 5.6 G/DL (ref 6.4–8.2)
RBC # BLD AUTO: 3.12 MILLION/UL (ref 3.88–5.62)
RBC MORPH BLD: PRESENT
SODIUM SERPL-SCNC: 136 MMOL/L (ref 136–145)
UNIT DISPENSE STATUS: NORMAL
UNIT DISPENSE STATUS: NORMAL
UNIT PRODUCT CODE: NORMAL
UNIT PRODUCT CODE: NORMAL
UNIT RH: NORMAL
UNIT RH: NORMAL
VARIANT LYMPHS # BLD AUTO: 4 %
WBC # BLD AUTO: 8.28 THOUSAND/UL (ref 4.31–10.16)

## 2021-07-28 PROCEDURE — 82948 REAGENT STRIP/BLOOD GLUCOSE: CPT

## 2021-07-28 PROCEDURE — 83735 ASSAY OF MAGNESIUM: CPT | Performed by: STUDENT IN AN ORGANIZED HEALTH CARE EDUCATION/TRAINING PROGRAM

## 2021-07-28 PROCEDURE — C9113 INJ PANTOPRAZOLE SODIUM, VIA: HCPCS | Performed by: PHYSICIAN ASSISTANT

## 2021-07-28 PROCEDURE — 85007 BL SMEAR W/DIFF WBC COUNT: CPT | Performed by: STUDENT IN AN ORGANIZED HEALTH CARE EDUCATION/TRAINING PROGRAM

## 2021-07-28 PROCEDURE — 85027 COMPLETE CBC AUTOMATED: CPT | Performed by: STUDENT IN AN ORGANIZED HEALTH CARE EDUCATION/TRAINING PROGRAM

## 2021-07-28 PROCEDURE — 84100 ASSAY OF PHOSPHORUS: CPT | Performed by: STUDENT IN AN ORGANIZED HEALTH CARE EDUCATION/TRAINING PROGRAM

## 2021-07-28 PROCEDURE — 99232 SBSQ HOSP IP/OBS MODERATE 35: CPT | Performed by: INTERNAL MEDICINE

## 2021-07-28 PROCEDURE — 86140 C-REACTIVE PROTEIN: CPT | Performed by: HOSPITALIST

## 2021-07-28 PROCEDURE — 80053 COMPREHEN METABOLIC PANEL: CPT | Performed by: STUDENT IN AN ORGANIZED HEALTH CARE EDUCATION/TRAINING PROGRAM

## 2021-07-28 RX ORDER — METHOCARBAMOL 500 MG/1
500 TABLET, FILM COATED ORAL EVERY 8 HOURS SCHEDULED
Status: DISCONTINUED | OUTPATIENT
Start: 2021-07-28 | End: 2021-08-02 | Stop reason: HOSPADM

## 2021-07-28 RX ORDER — HYDROMORPHONE HCL/PF 1 MG/ML
0.5 SYRINGE (ML) INJECTION EVERY 2 HOUR PRN
Status: DISCONTINUED | OUTPATIENT
Start: 2021-07-28 | End: 2021-08-02

## 2021-07-28 RX ORDER — SODIUM CHLORIDE 9 MG/ML
20 INJECTION, SOLUTION INTRAVENOUS CONTINUOUS
Status: DISCONTINUED | OUTPATIENT
Start: 2021-07-28 | End: 2021-07-28

## 2021-07-28 RX ORDER — ONDANSETRON 2 MG/ML
4 INJECTION INTRAMUSCULAR; INTRAVENOUS EVERY 8 HOURS PRN
Status: DISCONTINUED | OUTPATIENT
Start: 2021-07-28 | End: 2021-07-28 | Stop reason: SDUPTHER

## 2021-07-28 RX ORDER — ACETAMINOPHEN 325 MG/1
975 TABLET ORAL EVERY 8 HOURS SCHEDULED
Status: DISCONTINUED | OUTPATIENT
Start: 2021-07-28 | End: 2021-08-02 | Stop reason: HOSPADM

## 2021-07-28 RX ORDER — ONDANSETRON 2 MG/ML
4 INJECTION INTRAMUSCULAR; INTRAVENOUS EVERY 6 HOURS PRN
Status: DISCONTINUED | OUTPATIENT
Start: 2021-07-28 | End: 2021-08-02 | Stop reason: HOSPADM

## 2021-07-28 RX ORDER — SODIUM CHLORIDE 9 MG/ML
20 INJECTION, SOLUTION INTRAVENOUS CONTINUOUS
Status: DISCONTINUED | OUTPATIENT
Start: 2021-07-28 | End: 2021-07-31

## 2021-07-28 RX ORDER — METHYLPREDNISOLONE SODIUM SUCCINATE 40 MG/ML
20 INJECTION, POWDER, LYOPHILIZED, FOR SOLUTION INTRAMUSCULAR; INTRAVENOUS EVERY 8 HOURS SCHEDULED
Status: DISCONTINUED | OUTPATIENT
Start: 2021-07-28 | End: 2021-07-31

## 2021-07-28 RX ORDER — DEXTROSE, SODIUM CHLORIDE, AND POTASSIUM CHLORIDE 5; .45; .15 G/100ML; G/100ML; G/100ML
20 INJECTION INTRAVENOUS CONTINUOUS
Status: DISCONTINUED | OUTPATIENT
Start: 2021-07-28 | End: 2021-07-28

## 2021-07-28 RX ORDER — PANTOPRAZOLE SODIUM 40 MG/1
40 INJECTION, POWDER, FOR SOLUTION INTRAVENOUS
Status: DISCONTINUED | OUTPATIENT
Start: 2021-07-28 | End: 2021-08-02 | Stop reason: HOSPADM

## 2021-07-28 RX ADMIN — METHOCARBAMOL 500 MG: 500 TABLET, FILM COATED ORAL at 13:14

## 2021-07-28 RX ADMIN — SODIUM CHLORIDE 20 ML/HR: 0.9 INJECTION, SOLUTION INTRAVENOUS at 11:38

## 2021-07-28 RX ADMIN — METHYLPREDNISOLONE SODIUM SUCCINATE 20 MG: 40 INJECTION, POWDER, FOR SOLUTION INTRAMUSCULAR; INTRAVENOUS at 13:14

## 2021-07-28 RX ADMIN — PANTOPRAZOLE SODIUM 40 MG: 40 INJECTION, POWDER, FOR SOLUTION INTRAVENOUS at 06:13

## 2021-07-28 RX ADMIN — HEPARIN SODIUM 5000 UNITS: 5000 INJECTION INTRAVENOUS; SUBCUTANEOUS at 13:14

## 2021-07-28 RX ADMIN — METHOCARBAMOL 500 MG: 500 TABLET, FILM COATED ORAL at 06:27

## 2021-07-28 RX ADMIN — ASCORBIC ACID: 500 INJECTION INTRAVENOUS at 20:35

## 2021-07-28 RX ADMIN — METHOCARBAMOL 500 MG: 500 TABLET, FILM COATED ORAL at 21:49

## 2021-07-28 RX ADMIN — DEXTROSE, SODIUM CHLORIDE, AND POTASSIUM CHLORIDE 20 ML/HR: 5; .45; .15 INJECTION INTRAVENOUS at 10:02

## 2021-07-28 RX ADMIN — HEPARIN SODIUM 5000 UNITS: 5000 INJECTION INTRAVENOUS; SUBCUTANEOUS at 21:52

## 2021-07-28 RX ADMIN — HEPARIN SODIUM 5000 UNITS: 5000 INJECTION INTRAVENOUS; SUBCUTANEOUS at 09:16

## 2021-07-28 RX ADMIN — SODIUM CHLORIDE, SODIUM LACTATE, POTASSIUM CHLORIDE, AND CALCIUM CHLORIDE 125 ML/HR: .6; .31; .03; .02 INJECTION, SOLUTION INTRAVENOUS at 04:10

## 2021-07-28 RX ADMIN — ACETAMINOPHEN 975 MG: 325 TABLET, FILM COATED ORAL at 13:14

## 2021-07-28 RX ADMIN — METHYLPREDNISOLONE SODIUM SUCCINATE 20 MG: 40 INJECTION, POWDER, FOR SOLUTION INTRAMUSCULAR; INTRAVENOUS at 21:47

## 2021-07-28 RX ADMIN — METHYLPREDNISOLONE SODIUM SUCCINATE 20 MG: 40 INJECTION, POWDER, FOR SOLUTION INTRAMUSCULAR; INTRAVENOUS at 06:13

## 2021-07-28 RX ADMIN — ACETAMINOPHEN 975 MG: 325 TABLET, FILM COATED ORAL at 21:49

## 2021-07-28 RX ADMIN — ACETAMINOPHEN 975 MG: 325 TABLET, FILM COATED ORAL at 06:27

## 2021-07-28 RX ADMIN — Medication 10000 UNITS: at 11:40

## 2021-07-28 NOTE — NURSING NOTE
[facility] surgery resident Adam Sykes, notified of patients high bladder scan volume and high PVR after voiding 300  Per provider no straight cath at this time, just continue to monitor protocol, pt resting comfortably and voided 300 ml without discomfort   Will continue to scan pt

## 2021-07-28 NOTE — PROGRESS NOTES
Progress Note - Colorectal Surgery   Concha Zafar 24 y o  male MRN: [id number]  Unit/Bed#: [facility] 816-01 Encounter: [id number]    Assessment:  24 y o  male w/ ulcerative colitis now s/p LAPAROSCOPIC HAND ASSISTED SUBCOLECTOMY; END ILEOSTOMY on 7/27    Plan:  - Diet NPO; Sips with meds-- consider advancing to clears  - PICC/ TPN  - Pain control w/ PCA -- consider adding something else  - Nausea control PRN  - Incentive spirometry  - OOB, ambulate  - Monitor Hgb  - Keep rico  - ileostomy    Subjective/Objective     Subjective:   No acute events overnight  Patient is having considerable pain this morning  Gotten 3 6 from PCA in last 9 hours  -N  -V      Objective:     Blood pressure 138/80, pulse 89, temperature 98 8 °F (37 1 °C), resp  rate 18, height 6' 3" (1 905 m), weight 96 4 kg (212 lb 8 4 oz), SpO2 97 %  ,Body mass index is 26 56 kg/m²        Intake/Output Summary (Last 24 hours) at 7/28/2021 0544  Last data filed at 7/28/2021 0250  Gross per 24 hour   Intake 3950 ml   Output 2220 ml   Net 1730 ml       Invasive Devices     Peripherally Inserted Central Catheter Line            PICC Line 27/34/08 Right Basilic 6 days          Peripheral Intravenous Line            Peripheral IV 07/27/21 Left Forearm <1 day          Arterial Line            Arterial Line 07/27/21 Right Radial <1 day          Drain            Closed/Suction Drain Left LLQ Bulb 19 Fr  <1 day    Ileostomy RLQ <1 day    Urethral Catheter <1 day                Physical Exam:   Gen: NAD, Comfortable  Neuro: A&O, No focal deficits  Head: Normal Cephalic, Atraumatic  Eye: EOMI, PERRLA, No scleral icterus  Neck: Supple, No JVD, Midline trachea  CV: RRR, Cap refill <2 sec  Pulm: Normal work of breathing, no respiratory distress  Abd: Soft, Non-Distended, non focally Tender to palpation, ileostomy no output, incisions c/d/i  Ext: No edema, Non-tender  Skin: warm, dry, intact

## 2021-07-28 NOTE — RESTORATIVE TECHNICIAN NOTE
Restorative Technician Note      Patient Name: Eusebia Torres     Note Type: Mobility  Patient Position Upon Consult: Supine  Activity Performed: Dangled;Stood;Repositioned  Assistive Device: Standard walker  Education Provided: Yes  Patient Position at End of Consult: Supine; All needs within reach

## 2021-07-28 NOTE — PROGRESS NOTES
Progress Note- Roberto Carlos Latif 24 y o  male MRN: [id number]    Unit/Bed#: [facility] 724-10 Encounter: [id number]      Assessment and Plan:    22 y/o male with ulcerative colitis refractory to IV steroids with subtotal colectomy and end ileostomy who received accelerated schedule of Remicade  Ulcerative colitis refractory to IV steroids  · Accelerated schedule of Remicade, third dose on 7/21  · S/p subtotal colectomy with end ileostomy, POD1 - pain controlled with hydromorphone  · Transition to PO steroid tomorrow if he tolerates PO diet today  · Continue IV steroid, DVT prophylaxis, PPI, pain management  · CRP 20 3, Hg 8 8    Protein-calorie malnutrition  · Continue TPN  · Monitor electrolytes      ______________________________________________________________________    Subjective:     Subtotal colectomy with end ileostomy yesterday  Received 2 units of PRBC yesterday  Mary catheter in  No overnight events  Has abdominal pain and receiving PCA hydromorphone      Medication Administration - last 24 hours from 07/27/2021 0950 to 07/28/2021 0950       Date/Time Order Dose Route Action Action by     07/28/2021 0526 pantoprazole (PROTONIX) EC tablet 40 mg   Oral MAR Unhold Sarah Garibay PA-C     07/27/2021 1506 pantoprazole (PROTONIX) EC tablet 40 mg   Oral MAR Hold Automatic Transfer Provider     07/28/2021 0526 saccharomyces boulardii (FLORASTOR) capsule 250 mg   Oral MAR Unhold Sarah Garibay PA-C     07/27/2021 1800 saccharomyces boulardii (FLORASTOR) capsule 250 mg   Oral Dose Auto Held Automatic Transfer Provider     07/27/2021 1506 saccharomyces boulardii (FLORASTOR) capsule 250 mg   Oral MAR Hold Automatic Transfer Provider     07/28/2021 0526 acetaminophen (TYLENOL) tablet 650 mg   Oral MAR Unhold Sarah Garibay PA-C     07/27/2021 1506 acetaminophen (TYLENOL) tablet 650 mg   Oral MAR Hold Automatic Transfer Provider     07/28/2021 0527 methylPREDNISolone sodium succinate (Solu-MEDROL) injection 20 mg   Intravenous MAR Unhold Sarah Garibay PA-C     07/27/2021 2200 methylPREDNISolone sodium succinate (Solu-MEDROL) injection 20 mg   Intravenous Dose Auto Held Automatic Transfer Provider     07/27/2021 2142 methylPREDNISolone sodium succinate (Solu-MEDROL) injection 20 mg 20 mg Intravenous Given Dylon Prince RN     07/27/2021 1506 methylPREDNISolone sodium succinate (Solu-MEDROL) injection 20 mg   Intravenous MAR Hold Automatic Transfer Provider     07/27/2021 1320 methylPREDNISolone sodium succinate (Solu-MEDROL) injection 20 mg 20 mg Intravenous Given Lu Lerma RN     07/27/2021 1506 ondansetron (ZOFRAN) injection 4 mg   Intravenous MAR Hold Automatic Transfer Provider     07/27/2021 1934 enoxaparin (LOVENOX) subcutaneous injection 40 mg   Subcutaneous MAR Christina Foley MD     07/27/2021 1506 enoxaparin (LOVENOX) subcutaneous injection 40 mg   Subcutaneous MAR Hold Automatic Transfer Provider     07/27/2021 1826 dextrose 5 % and sodium chloride 0 45 % with KCl 20 mEq/L infusion 0  Intravenous Stopped Wale Causey CRNA     07/27/2021 1620 dextrose 5 % and sodium chloride 0 45 % with KCl 20 mEq/L infusion   Intravenous Continue from Pre-op Kristy Grigsby MD     07/28/2021 0526 insulin lispro (HumaLOG) 100 units/mL subcutaneous injection 1-6 Units   Subcutaneous MAR Unhold Sarah Garibay PA-C     07/28/2021 0000 insulin lispro (HumaLOG) 100 units/mL subcutaneous injection 1-6 Units 1 Units Subcutaneous Not Given Rosenda Estrada RN     07/27/2021 1800 insulin lispro (HumaLOG) 100 units/mL subcutaneous injection 1-6 Units   Subcutaneous Dose Auto Held Automatic Transfer Provider     07/27/2021 1506 insulin lispro (HumaLOG) 100 units/mL subcutaneous injection 1-6 Units   Subcutaneous MAR Hold Automatic Transfer Provider     07/27/2021 1256 insulin lispro (HumaLOG) 100 units/mL subcutaneous injection 1-6 Units 1 Units Subcutaneous Not Given Lu Lerma RN 07/27/2021 1620 Adult 3-in-1 TPN (custom base / custom electrolytes)   Intravenous Continue from [facility], [place]     07/28/2021 0526 ceFAZolin (ANCEF) IVPB (premix in dextrose) 2,000 mg 50 mL   Intravenous MAR Unhold Sarah Garibay PA-C     07/27/2021 1506 ceFAZolin (ANCEF) IVPB (premix in dextrose) 2,000 mg 50 mL   Intravenous MAR Hold Automatic Transfer Provider     07/28/2021 0526 metroNIDAZOLE (FLAGYL) IVPB (premix) 500 mg 100 mL   Intravenous MAR Unhold Sarah Garibay PA-C     07/27/2021 1506 metroNIDAZOLE (FLAGYL) IVPB (premix) 500 mg 100 mL   Intravenous MAR Hold Automatic Transfer Provider     07/27/2021 2201 Adult 3-in-1 TPN (custom base / custom electrolytes)   Intravenous New [address], RN     07/28/2021 0410 lactated ringers infusion 125 mL/hr Intravenous New Bag Guillaume Needles, RN     07/27/2021 2027 lactated ringers infusion 125 mL/hr Intravenous [address], [address] [place] [address]     07/27/2021 2015 HYDROmorphone (DILAUDID) 1 mg/mL 50 mL PCA   Intravenous [address]ks, [facility]     07/27/2021 2140 lactated ringers infusion   Intravenous Canceled Entry Lou Wills, RN     07/28/2021 0916 heparin (porcine) subcutaneous injection 5,000 Units 5,000 Units Subcutaneous Given Dandy Khan, RN     07/28/2021 1896 pantoprazole (PROTONIX) injection 40 mg 40 mg Intravenous Given Guillaume Needles, RN     07/28/2021 3409 methylPREDNISolone sodium succinate (Solu-MEDROL) injection 20 mg 20 mg Intravenous Given Guillaume Needles, RN     07/28/2021 9989 insulin lispro (HumaLOG) 100 units/mL subcutaneous injection 1-6 Units 1 Units Subcutaneous Not Given Guillaume Needles, RN     07/28/2021 5514 acetaminophen (TYLENOL) tablet 975 mg 975 mg Oral Given Guillaume Needles, RN     07/28/2021 7618 methocarbamol (ROBAXIN) tablet 500 mg 500 mg Oral Given Guillaume Traylor RN          Objective:     Vitals: Blood pressure 133/74, pulse 82, temperature 99 °F (37 2 °C), resp   rate 18, height 6' 3" (1 905 m), weight 96 4 kg (212 lb 8 4 oz), SpO2 97 %  ,Body mass index is 26 56 kg/m²  Intake/Output Summary (Last 24 hours) at 7/28/2021 0950  Last data filed at 7/28/2021 0913  Gross per 24 hour   Intake 3954 6 ml   Output 4020 ml   Net -65 4 ml       Physical Exam:   General Appearance: Awake and alert, in no acute distress  Abdomen: Diffuse tenderness, MEGHANA drain contains serosanguinous fluid, incision sites are clean and dry without erythema or induration, soft, non-distended; bowel sounds normal; no masses or no organomegaly    Invasive Devices     Peripherally Inserted Central Catheter Line            PICC Line 37/54/04 Right Basilic 6 days          Peripheral Intravenous Line            Peripheral IV 07/27/21 Left Forearm <1 day          Arterial Line            Arterial Line 07/27/21 Right Radial <1 day          Drain            Closed/Suction Drain Left LLQ Bulb 19 Fr  <1 day    Ileostomy RLQ <1 day    Urethral Catheter <1 day                Lab Results:  No results displayed because visit has over 200 results  Imaging Studies: I have personally reviewed pertinent imaging studies

## 2021-07-28 NOTE — QUICK NOTE
Postop Note - Colorectal Surgery  Magno Lira 24 y o  male MRN: [id number]  Unit/Bed#: [facility] 816-01 Encounter: [id number]    Assessment:  24 y o  male w/ Crohn's now 0 Day of Surgery s/p Procedure(s) (LRB):  LAPAROSCOPIC HAND ASSISTED SUBCOLECTOMY; END ILEOSTOMY (N/A)    Plan:  - Diet NPO; Sips with meds  Adult 3-in-1 TPN (custom base / custom electrolytes)  - Pain control w/ PCA   - Nausea control PRN  - Incentive spirometry  - OOB, ambulate  - Monitor Hgb  - Keep rico    Subjective/Objective     Subjective: The patients pain is well controlled w/ PCA and the patient denies any nausea      Objective:   Vitals: Blood pressure 145/84, pulse 88, temperature 98 1 °F (36 7 °C), resp  rate 16, height 6' 3" (1 905 m), weight 96 4 kg (212 lb 8 4 oz), SpO2 97 %  ,Body mass index is 26 56 kg/m²  I/O       07/26 0701 - 07/27 0700 07/27 0701 - 07/28 0700    P  O   0    I V  (mL/kg)  2500 (25 9)    Blood  700    IV Piggyback  750    Total Intake(mL/kg)  3950 (41)    Urine (mL/kg/hr)  880 (0 6)    Stool 800 0    Blood  100    Total Output 800 980    Net -800 +2970          Unmeasured Urine Occurrence  1 x    Unmeasured Stool Occurrence  1 x          Physical Exam:  Gen: NAD, Aox3, Comfortable in bed  Chest: Normal work of breathing, no respiratory distress  Abd: Soft, ND, appropriately tender  Ostomy is pink and well perfused  Surgical sites are clean, dry, and intact   Ext: No Edema  Skin: Warm, Dry, Intact      Lab, Imaging and other studies:   I have personally reviewed pertinent reports    , CBC with diff:   Lab Results   Component Value Date    WBC 6 28 07/27/2021    HGB 7 0 (L) 07/27/2021    HCT 22 6 (L) 07/27/2021    MCV 90 07/27/2021     07/27/2021    MCH 27 9 07/27/2021    MCHC 31 0 (L) 07/27/2021    RDW 18 7 (H) 07/27/2021    MPV 11 1 07/27/2021    NRBC 1 07/27/2021     VTE Pharmacologic Prophylaxis: Heparin  VTE Mechanical Prophylaxis: sequential compression device        Bon Ramírez MD  7/27/2021  10:17 PM

## 2021-07-28 NOTE — WOUND OSTOMY CARE
Progress Note - Wound   Modesto Branch 24 y o  male MRN: [id number]  Unit/Bed#: [facility] 816-01 Encounter: [id number]      Assessment:  Patient is POD #1 laparoscopic hand assisted subcolectomy with end ileostomy creation  Seen for ostomy education and teaching  Introduced self and role to patient, and his mother Kamryn Colbert who is at the bedside  Patient and mother agreeable to visit  Patient with PCA pump and having abdominal pain, not medically appropriate for teaching  Briefly discussed with normal stoma appearance, gas/odor producing foods, importance of hydration, emptying, and different types of pouches  R sided abdominal ileostomy visualized through an intact one piece ostomy pouch  Stoma appears budded round and pink  Approx: [id number]  Os is noted centrally  Mother reports she is going to be at the bedside and staying with the patient 24/7, and is eager to learn more about ostomy care to assist her son with his care  Mother and patient denies having questions or concerns  Encouraged patient to read the educational materials provided - "Life after ileostomy Surgery" by [facility]  Plan:   1  Encourage patient to engage in self care of their ostomy - emptying etc   2  Encourage patient to read over the ostomy educational materials  3  Empty the pouch when no more than 1/3 full to prevent leaking or lifting of the barrier  Change pouch every 3-4 days and as needed for soilage/dislodgement  Objective:    Vitals: Blood pressure 126/66, pulse 73, temperature 97 5 °F (36 4 °C), resp  rate 18, height 6' 3" (1 905 m), weight 96 4 kg (212 lb 8 4 oz), SpO2 98 %  ,Body mass index is 26 56 kg/m²      Wound care team will continue to follow along during inpatient hospital stay,  Johnson Núñez RN BSN [facility]

## 2021-07-28 NOTE — PROGRESS NOTES
Peripheral Nerve Block Follow-up Note - Acute Pain Service    Cosmo Dillon 24 y o  male MRN: [id number]  Unit/Bed#: [facility] 816-01 Encounter: [id number]      Assessment:   Principal Problem:    Colitis, ulcerative ([facility] [address] )  Active Problems:    Iron deficiency anemia    Transaminitis    ABLA (acute blood loss anemia)    Severe protein-calorie malnutrition ([facility] [address] )    Cosmo Dillon is a 24y o  year old male with a history of ulcerative colitis  POD#1   Laparoscopic hand assisted subtotal colectomy and end ileostomy  Bilateral tap blocks with Exparel were done preoperatively for postop pain control; no sensory deficits noted on exam   Epidural was not placed as patient was on Lovenox prior to surgery  Patient started on clear liquid diet  Plan:   · Tylenol 975 mg every 8 hours scheduled   · Robaxin 500 mg every 8 hours scheduled  · Solu-Medrol per primary service  · Dilaudid PCA:  · No basal  · PCA dose 0 2mg  · PCA lockout 6 minutes  · One hour limit 2mg  · Dilaudid 0 5mg IV every 4 hours as needed for breakthrough pain  · Encouraged ambulation    If pain not controlled in the next 24 hours, will consider epidural placement or ketamine infusion  If patient is tolerating oral medications can also consider transition to oral analgesics tomorrow  Treatment recommendations discussed with primary care service  APS will continue to follow  Please contact Acute Pain Service - [facility] via Amore[facility] from [phone] with additional questions or concerns  See Jo Ann or Vickey for additional contacts and after hours information  Pain History  Current pain location(s): abdomen  Pain Scale:   0-8  24 hour history:   Patient is resting in bed, no acute distress  Reporting intermittent episodes of severe abdominal pain, pain increases with any movement or repositioning  Currently on a Dilaudid PCA which he feels is mildly effective in pain reduction   No history of chronic pain, does not take opioids prior to admission  Opioid requirement previous 24 hours:   Dilaudid PCA total 4 6 mg, IV Dilaudid 2 5mg    Meds/Allergies   all current active meds have been reviewed, current meds:   Current Facility-Administered Medications   Medication Dose Route Frequency    acetaminophen (TYLENOL) tablet 975 mg  975 mg Oral Q8H [facility]    Adult 3-in-1 TPN (custom base / custom electrolytes)   Intravenous Continuous TPN    Adult 3-in-1 TPN (custom base / custom electrolytes)   Intravenous Continuous TPN    heparin (porcine) subcutaneous injection 5,000 Units  5,000 Units Subcutaneous Q8H [facility]    HYDROmorphone (DILAUDID) 1 mg/mL 50 mL PCA   Intravenous Continuous    HYDROmorphone (DILAUDID) injection 0 5 mg  0 5 mg Intravenous Q2H PRN    insulin lispro (HumaLOG) 100 units/mL subcutaneous injection 1-6 Units  1-6 Units Subcutaneous TID With Meals    methocarbamol (ROBAXIN) tablet 500 mg  500 mg Oral Q8H [facility]    methylPREDNISolone sodium succinate (Solu-MEDROL) injection 20 mg  20 mg Intravenous Q8H [facility]    ondansetron (ZOFRAN) injection 4 mg  4 mg Intravenous Q6H PRN    pantoprazole (PROTONIX) injection 40 mg  40 mg Intravenous Q24H [facility]    sodium chloride 0 9 % infusion  20 mL/hr Intravenous Continuous    vitamin A capsule 10,000 Units  10,000 Units Oral Daily    and PTA meds:   Prior to Admission Medications   Prescriptions Last Dose Informant Patient Reported?  Taking?   ferrous sulfate 325 (65 Fe) mg tablet   No No   Sig: Take 1 tablet (325 mg total) by mouth daily with breakfast   mesalamine (DELZICOL) 400 mg   No No   Sig: Take 2 capsules (800 mg total) by mouth 3 (three) times a day   mesalamine (ROWASA) 4 g   No No   Sig: Insert 60 mL (4 g total) into the rectum daily at bedtime   pantoprazole (PROTONIX) 40 mg tablet   No No   Sig: Take 1 tablet (40 mg total) by mouth daily in the early morning   predniSONE 20 mg tablet   No No   Sig: Take 2 tablets (40 mg total) by mouth daily      Facility-Administered Medications: None No Known Allergies    Objective     Temp:  [97 °F (36 1 °C)-99 °F (37 2 °C)] 98 5 °F (36 9 °C)  HR:  [70-90] 76  Resp:  [16-18] 16  BP: (127-159)/(64-86) 127/64  Arterial Line BP: (182-186)/(70-80) 186/70    Physical Exam  Vitals reviewed  Constitutional:       General: He is awake  He is not in acute distress  Appearance: He is not ill-appearing, toxic-appearing or diaphoretic  HENT:      Head: Normocephalic and atraumatic  Nose: Nose normal    Pulmonary:      Effort: Pulmonary effort is normal  No tachypnea, bradypnea or respiratory distress  Abdominal:      General: Abdomen is flat  Tenderness: There is abdominal tenderness  Skin:     General: Skin is warm and dry  Neurological:      Mental Status: He is alert and oriented to person, place, and time  Mental status is at baseline  Psychiatric:         Attention and Perception: Attention normal          Mood and Affect: Mood normal  Mood is not anxious  Speech: Speech normal          Behavior: Behavior normal  Behavior is cooperative  Lab Results:   Results from last 7 days   Lab Units 07/28/21  0532   WBC Thousand/uL 8 28   HEMOGLOBIN g/dL 8 8*   HEMATOCRIT % 27 7*   PLATELETS Thousands/uL 194      Results from last 7 days   Lab Units 07/28/21  0532 07/27/21  1906   POTASSIUM mmol/L 3 6  --    CHLORIDE mmol/L 104  --    CO2 mmol/L 28  --    CO2, I-STAT mmol/L  --  22   BUN mg/dL 7  --    CREATININE mg/dL 0 44*  --    CALCIUM mg/dL 8 6  --    ALK PHOS U/L 54  --    ALT U/L 42  --    AST U/L 9  --    GLUCOSE, ISTAT mg/dl  --  161*       Imaging Studies: I have personally reviewed pertinent reports  Counseling / Coordination of Care  Total floor / unit time spent today 15 minutes  Greater than 50% of total time was spent with the patient and / or family counseling and / or coordination of care   A description of the counseling / coordination of care: Reviewed plan of care and medications with patient, mother and Primary Care Service  Please note that the APS provides consultative services regarding pain management only  With the exception of ketamine, peripheral nerve catheters, and epidural infusions (and except when indicated), final decisions regarding starting or changing doses of analgesic medications are at the discretion of the consulting service  Off hours consultation and/or medication management is generally not available      J CARLOS Argueta  Acute Pain Service

## 2021-07-28 NOTE — ANESTHESIA POSTPROCEDURE EVALUATION
Post-Op Assessment Note    CV Status:  Stable  Pain Score: 0    Pain management: adequate     Mental Status:  Alert and awake   Hydration Status:  Euvolemic and stable   PONV Controlled:  Controlled   Airway Patency:  Patent and adequate      Post Op Vitals Reviewed: Yes      Staff: CRNA         No complications documented      /77 (07/27/21 2001)    Temp (!) 97 °F (36 1 °C) (07/27/21 2001)    Pulse 70 (07/27/21 2001)   Resp   14   SpO2 100 % (07/27/21 2001)

## 2021-07-29 LAB
ANION GAP SERPL CALCULATED.3IONS-SCNC: 2 MMOL/L (ref 4–13)
BUN SERPL-MCNC: 10 MG/DL (ref 5–25)
CALCIUM SERPL-MCNC: 8.9 MG/DL (ref 8.3–10.1)
CHLORIDE SERPL-SCNC: 103 MMOL/L (ref 100–108)
CO2 SERPL-SCNC: 30 MMOL/L (ref 21–32)
CREAT SERPL-MCNC: 0.4 MG/DL (ref 0.6–1.3)
CRP SERPL QL: 27.4 MG/L
ERYTHROCYTE [DISTWIDTH] IN BLOOD BY AUTOMATED COUNT: 19 % (ref 11.6–15.1)
GFR SERPL CREATININE-BSD FRML MDRD: 170 ML/MIN/1.73SQ M
GLUCOSE SERPL-MCNC: 118 MG/DL (ref 65–140)
GLUCOSE SERPL-MCNC: 122 MG/DL (ref 65–140)
GLUCOSE SERPL-MCNC: 123 MG/DL (ref 65–140)
GLUCOSE SERPL-MCNC: 129 MG/DL (ref 65–140)
GLUCOSE SERPL-MCNC: 98 MG/DL (ref 65–140)
HCT VFR BLD AUTO: 31.7 % (ref 36.5–49.3)
HGB BLD-MCNC: 9.7 G/DL (ref 12–17)
MCH RBC QN AUTO: 28 PG (ref 26.8–34.3)
MCHC RBC AUTO-ENTMCNC: 30.6 G/DL (ref 31.4–37.4)
MCV RBC AUTO: 91 FL (ref 82–98)
PLATELET # BLD AUTO: 191 THOUSANDS/UL (ref 149–390)
PMV BLD AUTO: 11.2 FL (ref 8.9–12.7)
POTASSIUM SERPL-SCNC: 3.7 MMOL/L (ref 3.5–5.3)
RBC # BLD AUTO: 3.47 MILLION/UL (ref 3.88–5.62)
SODIUM SERPL-SCNC: 135 MMOL/L (ref 136–145)
WBC # BLD AUTO: 9.73 THOUSAND/UL (ref 4.31–10.16)

## 2021-07-29 PROCEDURE — 80048 BASIC METABOLIC PNL TOTAL CA: CPT | Performed by: STUDENT IN AN ORGANIZED HEALTH CARE EDUCATION/TRAINING PROGRAM

## 2021-07-29 PROCEDURE — 99232 SBSQ HOSP IP/OBS MODERATE 35: CPT | Performed by: NURSE PRACTITIONER

## 2021-07-29 PROCEDURE — 99233 SBSQ HOSP IP/OBS HIGH 50: CPT | Performed by: INTERNAL MEDICINE

## 2021-07-29 PROCEDURE — C9113 INJ PANTOPRAZOLE SODIUM, VIA: HCPCS | Performed by: PHYSICIAN ASSISTANT

## 2021-07-29 PROCEDURE — 85027 COMPLETE CBC AUTOMATED: CPT | Performed by: STUDENT IN AN ORGANIZED HEALTH CARE EDUCATION/TRAINING PROGRAM

## 2021-07-29 PROCEDURE — 82948 REAGENT STRIP/BLOOD GLUCOSE: CPT

## 2021-07-29 PROCEDURE — 86140 C-REACTIVE PROTEIN: CPT | Performed by: HOSPITALIST

## 2021-07-29 RX ORDER — POTASSIUM CHLORIDE 20 MEQ/1
40 TABLET, EXTENDED RELEASE ORAL ONCE
Status: COMPLETED | OUTPATIENT
Start: 2021-07-29 | End: 2021-07-29

## 2021-07-29 RX ADMIN — METHOCARBAMOL 500 MG: 500 TABLET, FILM COATED ORAL at 05:14

## 2021-07-29 RX ADMIN — ACETAMINOPHEN 975 MG: 325 TABLET, FILM COATED ORAL at 05:13

## 2021-07-29 RX ADMIN — ACETAMINOPHEN 975 MG: 325 TABLET, FILM COATED ORAL at 14:25

## 2021-07-29 RX ADMIN — POTASSIUM CHLORIDE 40 MEQ: 1500 TABLET, EXTENDED RELEASE ORAL at 11:03

## 2021-07-29 RX ADMIN — HEPARIN SODIUM 5000 UNITS: 5000 INJECTION INTRAVENOUS; SUBCUTANEOUS at 22:25

## 2021-07-29 RX ADMIN — HEPARIN SODIUM 5000 UNITS: 5000 INJECTION INTRAVENOUS; SUBCUTANEOUS at 14:25

## 2021-07-29 RX ADMIN — METHYLPREDNISOLONE SODIUM SUCCINATE 20 MG: 40 INJECTION, POWDER, FOR SOLUTION INTRAMUSCULAR; INTRAVENOUS at 05:10

## 2021-07-29 RX ADMIN — ACETAMINOPHEN 975 MG: 325 TABLET, FILM COATED ORAL at 22:25

## 2021-07-29 RX ADMIN — METHOCARBAMOL 500 MG: 500 TABLET, FILM COATED ORAL at 14:25

## 2021-07-29 RX ADMIN — Medication 10000 UNITS: at 08:56

## 2021-07-29 RX ADMIN — METHOCARBAMOL 500 MG: 500 TABLET, FILM COATED ORAL at 22:25

## 2021-07-29 RX ADMIN — PANTOPRAZOLE SODIUM 40 MG: 40 INJECTION, POWDER, FOR SOLUTION INTRAVENOUS at 05:09

## 2021-07-29 RX ADMIN — METHYLPREDNISOLONE SODIUM SUCCINATE 20 MG: 40 INJECTION, POWDER, FOR SOLUTION INTRAMUSCULAR; INTRAVENOUS at 14:25

## 2021-07-29 RX ADMIN — HEPARIN SODIUM 5000 UNITS: 5000 INJECTION INTRAVENOUS; SUBCUTANEOUS at 05:15

## 2021-07-29 RX ADMIN — ASCORBIC ACID: 500 INJECTION INTRAVENOUS at 20:05

## 2021-07-29 RX ADMIN — METHYLPREDNISOLONE SODIUM SUCCINATE 20 MG: 40 INJECTION, POWDER, FOR SOLUTION INTRAMUSCULAR; INTRAVENOUS at 22:26

## 2021-07-29 NOTE — NUTRITION
07/29/21 1232   Nutrition Prognosis   Nutrition Considerations Other (Comment)  (Provided Ileostomy nutrition guidelines education to patient's mother  Patient was in bathroom during visit  Outpatient MNT brochure provided )   Recommendations/Interventions   Summary Patient's appetite remains poor on clear liquid diet this am  TPN continues to provide 100% nutritional needs at this time  Patient is s/p subtotal colectomy and end ileostomy  Skin remains intact  No new weights  Interventions Education initiated/completed;PN continue as ordered   Nutrition Recommendations Lab consider order (Specify); Other (Specify)  (When medically indicated suggest advance diet to Low fiber/residue with Ensure Max BID  Once po intake >50% suggest decrease TPN by half and then discontinue  Monitor electrolytes   Obtain current weight )

## 2021-07-29 NOTE — PROGRESS NOTES
Progress Note - Colorectal   Benji Antonio 24 y o  male MRN: [id number]  [facility] Encounter: [id number]      Objective:  Patient is doing well this morning  He is awake and alert  He has been out of bed  Patient needed to be straight cathed x1 last evening for 750 mL  Patient needs to be standing and out of bed in order to urinate  Acute Pain Service is on board to assist with his pain control  Patient states he is not totally pain controlled  He does lay in the bed stoic appearing  No output from his ileostomy as of this morning  Patient denies nausea, no emesis  Patient is on a clear liquid diet but taking minimal p o  Orbie Mattock 4000 + 1UA  (750 mls straight cath)  120 MEGHANA  0 ileostomy    Blood pressure 124/65, pulse 85, temperature 98 3 °F (36 8 °C), resp  rate 16, height 6' 3" (1 905 m), weight 96 4 kg (212 lb 8 4 oz), SpO2 98 %  ,Body mass index is 26 56 kg/m²        Intake/Output Summary (Last 24 hours) at 7/29/2021 0601  Last data filed at 7/29/2021 0249  Gross per 24 hour   Intake 1788 97 ml   Output 4120 ml   Net -2331 03 ml       Invasive Devices     Peripherally Inserted Central Catheter Line            PICC Line 07/66/64 Right Basilic 7 days          Peripheral Intravenous Line            Peripheral IV 07/27/21 Left Forearm 1 day          Arterial Line            Arterial Line 07/27/21 Right Radial 1 day          Drain            Closed/Suction Drain Left LLQ Bulb 19 Fr  1 day    Ileostomy RLQ 1 day                Physical Exam:   Alert, orientated x3, lying flat in bed, appears in abdominal discomfort  Abdomen:  Soft, flat, stoma of ileostomy is pink and healthy, no air in the bag, no drainage in the bag, or midline wound is clean and dry, nice closure, MEGHANA drain light serosanguineous  Extremities:  No calf tenderness bilaterally, no edema, venadynes are on and functioning    Lab, Imaging and other studies:  Pending  VTE Pharmacologic Prophylaxis: Heparin  VTE Mechanical Prophylaxis: sequential compression device    Assessment:  POD # 2 laparoscopic subtotal colectomy, end ileostomy  Ulcerative colitis  Anemia    Plan:  1  Continue clear liquid diet maybe add toast and crackers  2  Needs better pain control so he can be out of bed ambulating the halls throughout the day  3  Wound Care for ileostomy teaching  4  Check a m  Labs  5  Continue using the incentive spirometry 10 times hourly while awake  6  Continue TPN until eating well  7  Continue subcu heparin for DVT prophylaxis  8   Patient on Solu-Medrol 20 milligrams IV q 8 hours

## 2021-07-29 NOTE — UTILIZATION REVIEW
Continued Stay Review    Date: 7/29/21                          POD # 2   Current Patient Class: IP  Current Level of Care: MS    HPI:21 y o  male initially admitted on 7/15 with ulcerative colitis with ongoing BRBPR  Syncope at lab with blood draw  Assessment/Plan:   Pt went to the OR on 7/27  Was transfused 2 U PRBCs for hgb 6 8 and 6 5  Good response     +++++++++++++++++++++++++++++  7/27 OPERATIVE NOTE     Preop Diagnosis:  Severe, medical refractory ulcerative colitis     Postop diagnosis:  Severe, medical refractory ulcerative colitis     Procedure(s) (LRB):  Diagnostic laparoscopy  LAPAROSCOPIC Hand Assist subtotal colectomy  End ileostomy    Anesthesia Type:   General     Operative Indications:  Ulcerative colitis, severe, medical refractory, blood transfusion requirement     Operative Findings:  -pancolitis with burned out serosa sigmoid extending to proximal transverse colon  -subtotal colectomy, right lower quadrant end ileostomy, left lower quadrant MEGHANA drain looped in pelvis   ++++++++++++++++++++++++++++++    7/28 POD #1 - pt doing well, stoma looks good, needs better pain control with Dilaudid PCA, continue with TPN, PRN antiemetic, may advance to clears  Ambulate, continue rico, monitor H/H, HOURLY INCENTIVE SPIROMETRY, has drain in LLQ  Will change IV steroids to oral today once tolerating PO and will titrate them down 10 mg weekly  Adjustments to analgesia regimen - Scheduled Tylenol q 8 hr, Robaxin q 8 hr, continue PCA, add PRN IV Dilaudid for breakthrough, ambulate, if no improvement may need Ketamine drip  Rico d/c, had high PVR but was able to void  7/29 - did end up being straight cathed x 1 last evening for 750 ml urine  Needs to stand OOB to urinate  Pain is not totally controlled  No output from ileostomy this morning  No N/V, is on clear liquid diet but not taking much  Is ambulating  Continue TPN  Pain control improved  Plan to d/c PCA on 7/30        Vital Signs: 07/29/21 15:18:11  98 2 °F (36 8 °C)  86  20  111/74  86  --  --  97 %  --  --  --  --   07/29/21 11:14:52  98 °F (36 7 °C)  74  16  123/64  84  --  --  97 %  --  --  --  --   07/29/21 0700  --  --  --  --  --  --  --  --  --  None (Room air)  --  --   07/29/21 06:53:33  98 4 °F (36 9 °C)  73  18  120/64  83  --  --  97 %  --  --  --  --   07/29/21 02:49:12  98 3 °F (36 8 °C)  85  16  124/65  85  --  --  98 %  --  None (Room air)  --  --   07/28/21 23:20:58  98 5 °F (36 9 °C)  76  16  127/64  85  --  --  97 %  --  None (Room air)  --  --   07/28/21 1920  --  --  --  --  --  --  --  --  --  None (Room air)  --  --   07/28/21 1900  --  --  --  --  --  --  --  --  --  None (Room air)  --  --   07/28/21 18:47:05  97 9 °F (36 6 °C)  62  16  123/64  84  --  --  98 %  --  --  --  --   07/28/21 15:10:46  97 5 °F (36 4 °C)  73  18  126/66  86  --  --  98 %  --  --  --  --   07/28/21 11:35:26  98 5 °F (36 9 °C)  76  16  127/64  85  --  --  97 %  --  --  --  --   07/28/21 07:43:14  99 °F (37 2 °C)  82  18  133/74  94  --  --  97 %  --  --  --  --   07/28/21 06:48:45  98 9 °F (37 2 °C)  86  18  137/77  97  --  --  97 %  --  --  --  --   07/28/21 02:50:15  98 8 °F (37 1 °C)  89  18  138/80  99  --  --  97 %  --  --  --  --   07/28/21 00:31:48  98 6 °F (37 °C)  79  16  139/77  98  --  --  97 %  --  --  --  --   07/27/21 23:26:04  97 9 °F (36 6 °C)  90  16  145/84  104  --  --  98 %  --  --  --  --   07/27/21 22:01:25  98 1 °F (36 7 °C)  88  16  145/84  104  --  --  97 %  --  --  --  --   07/27/21 21:51:44  98 3 °F (36 8 °C)  85  18  146/86  106  --  --  98 %  --  --  --  --   07/27/21 21:13:14  98 3 °F (36 8 °C)  76  16  137/73  94  --  --  98 %  --  None (Room air)  --  Lying   07/27/21 2100  --  --  --  --  --  --  --  --  --  --  X  --   07/27/21 2045  --  80  18  157/78  --  --  --  99 %  --  None (Room air)  --  --   07/27/21 2030  --  74  18  159/86  --  186/70  102 mmHg  100 %  --  None (Room air)  --  --   07/27/21 2015 --  74  18  158/86  --  182/72  104 mmHg  100 %  --  None (Room air)  --  --   07/27/21 2001  97 °F (36 1 °C)Abnormal   70  18  148/77  --  186/80  110 mmHg  100 %  6 L/min  Simple mask  X  --   07/27/21 08:28:54  98 1 °F (36 7 °C)  95  18  103/64  77  --  --  98 %  --  --  --  --     Pertinent Labs/Diagnostic Results:       Results from last 7 days   Lab Units 07/29/21  0508 07/28/21  0532 07/27/21  2058 07/27/21  1906 07/27/21  1729 07/27/21  0618 07/26/21  0638 07/24/21  0620   WBC Thousand/uL 9 73 8 28 6 28  --   --  8 61 8 36 8 00   HEMOGLOBIN g/dL 9 7* 8 8* 7 0*  --   --  7 9* 8 5* 8 3*   I STAT HEMOGLOBIN g/dl  --   --   --  6 5* 6 8*  --   --   --    HEMATOCRIT % 31 7* 27 7* 22 6*  --   --  26 0* 27 5* 27 7*   HEMATOCRIT, ISTAT %  --   --   --  19* 20*  --   --   --    PLATELETS Thousands/uL 191 194 181  --   --  222 238 243   NEUTROS ABS Thousands/µL  --   --   --   --   --  4 07 3 50 4 68   BANDS PCT %  --  3 24*  --   --   --   --   --          Results from last 7 days   Lab Units 07/29/21  0508 07/28/21  0532 07/27/21 1906 07/27/21  1729 07/27/21  0618 07/26/21  5480 07/25/21  0625 07/23/21  0458   SODIUM mmol/L 135* 136  --   --  137 139 139 138  138   POTASSIUM mmol/L 3 7 3 6  --   --  2 9* 3 2* 3 5 3 6  3 6   CHLORIDE mmol/L 103 104  --   --  107 106 106 104  104   CO2 mmol/L 30 28  --   --  26 26 26 28  28   CO2, I-STAT mmol/L  --   --  22 26  --   --   --   --    ANION GAP mmol/L 2* 4  --   --  4 7 7 6  6   BUN mg/dL 10 7  --   --  11 10 10 10  10   CREATININE mg/dL 0 40* 0 44*  --   --  0 50* 0 46* 0 38* 0 43*  0 43*   EGFR ml/min/1 73sq m 170 163  --   --  155 160 173 165  165   CALCIUM mg/dL 8 9 8 6  --   --  8 3 8 2* 8 5 8 3  8 3   CALCIUM, IONIZED, ISTAT mmol/L  --   --  1 15 1 23  --   --   --   --    MAGNESIUM mg/dL  --  2 0  --   --   --  2 2 2 1 2 4   PHOSPHORUS mg/dL  --  3 2  --   --   --  3 4 3 7 3 0     Results from last 7 days   Lab Units 07/28/21  0532 07/26/21  8395 07/23/21  0458   AST U/L 9 19 15  15   ALT U/L 42 82* 69  69   ALK PHOS U/L 54 68 60   TOTAL PROTEIN g/dL 5 6* 5 8* 5 5*  5 6*   ALBUMIN g/dL 2 3* 2 1* 1 9*  1 9*   TOTAL BILIRUBIN mg/dL 0 57 0 42 0 33  0 33   BILIRUBIN DIRECT mg/dL  --   --  0 09     Results from last 7 days   Lab Units 07/29/21  1056 07/29/21  0850 07/28/21  2118 07/28/21  1607 07/28/21  1143 07/28/21  0612 07/27/21  2338 07/27/21  1118 07/27/21  0632 07/26/21  2331 07/26/21  1830 07/26/21  1213   POC GLUCOSE mg/dl 123 129 121 143* 140 136 148* 126 118 129 137 129     Results from last 7 days   Lab Units 07/29/21  0508 07/28/21  0532 07/27/21  0618 07/26/21  8158 07/25/21  0625 07/23/21  0458   GLUCOSE RANDOM mg/dL 98 123 109 88 119 151*  151*     Results from last 7 days   Lab Units 07/27/21  1906 07/27/21  1729   I STAT BASE EXC mmol/L -3* -1   I STAT O2 SAT % 100* 100*   ISTAT PH ART  7 415 7 354   I STAT ART PCO2 mm HG 33 5* 44 1*   I STAT ART PO2 mm  0* 196 0*   I STAT ART HCO3 mmol/L 21 5* 24 6     Results from last 7 days   Lab Units 07/27/21  0618   PROTIME seconds 14 9*   INR  1 17     Results from last 7 days   Lab Units 07/29/21  0508 07/28/21  0532 07/27/21  0618 07/26/21  1628 07/25/21  0625   CRP mg/L 27 4* 20 3* 19 6* 18 5* 30 2*     Results from last 7 days   Lab Units 07/23/21  1734   C DIFF TOXIN B BY PCR  Negative     Medications:   Scheduled Medications:  acetaminophen, 975 mg, Oral, Q8H [facility]  heparin (porcine), 5,000 Units, Subcutaneous, Q8H [facility]  insulin lispro, 1-6 Units, Subcutaneous, TID With Meals  methocarbamol, 500 mg, Oral, Q8H [facility]  methylPREDNISolone sodium succinate, 20 mg, Intravenous, Q8H [facility]  pantoprazole, 40 mg, Intravenous, Q24H [facility]  vitamin A, 10,000 Units, Oral, Daily      Continuous IV Infusions:  Adult TPN (CUSTOM BASE/CUSTOM ELECTROLYTE), , Intravenous, Continuous TPN  HYDROmorphone PCA Intravenous, Continuous  sodium chloride, 20 mL/hr, Intravenous, Continuous      PRN Meds:  HYDROmorphone, 0 5 mg, Intravenous, Q2H PRN  ondansetron, 4 mg, Intravenous, Q6H PRN    Discharge Plan: home with [facility] AT [facility] Utilization Review Department  ATTENTION: Please call with any questions or concerns to [phone] and carefully listen to the prompts so that you are directed to the right person  All voicemails are confidential   Elnor Raider all requests for admission clinical reviews, approved or denied determinations and any other requests to dedicated fax number below belonging to the campus where the patient is receiving treatment   List of dedicated fax numbers for the Facilities:  [address] DENIALS (Administrative/Medical Necessity) [phone]   [address] (Maternity/NICU/Pediatrics) [phone]   [address] Kelvin Garrison [phone] [place] [address] [address] Eduardo Jovi [address] [place] [address] [phone]

## 2021-07-29 NOTE — WOUND OSTOMY CARE
Progress Note- Ostomy  Jelena Ket 24 y o  male  [id number]  [facility] 816-[facility] 816-01        Assessment:  POD # 2 - Patient seen this morning for continued ostomy care and teaching  He is awake, in bed with mom at bed side, patient offers no complaints and is receptive to continued teaching  Mom is serving as secondary learner, she is participating in the teaching/learnning process by asking questions and observing what is being done  Teaching started with reviewing healing process and expected changes as healing takes place  Reviewed diatary recommendations as well as maintaining hydration  Hand on demonstration given on how to empty pouch, discussed styles of pouches and supplies ordered and reviewed what each item was used for  Step by step explanation of pouch change given and demonstrated, new pouch applies  Both patient and mom are receptive to teaching and wish to continue learning we plan to see both patient and mom on Monday, ostomy care educational material provided  Plan: We will continue following with ostomy care and teaching  Vitals:    07/29/21 1518   BP: 111/74   Pulse: 86   Resp: 20   Temp: 98 2 °F (36 8 °C)   SpO2: 97%       Wound 07/27/21 Abdomen N/A (Active)   Wound Description Clean;Dry; Intact 07/29/21 0755   [place]-wound Assessment Clean;Dry; Intact 07/29/21 0755   Wound Site Closure Exofin 07/29/21 0755   Drainage Amount None 07/29/21 0755   Dressing Open to air 07/29/21 0755   Dressing Status Clean;Dry; Intact 07/27/21 2100   Number of days: 2       Wound 07/27/21 Abdomen Left (Active)   Wound Description INES 07/29/21 0755   [place]-wound Assessment Clean;Dry; Intact 07/28/21 0820   Wound Site Closure Unable to assess 07/27/21 2100   Drainage Amount None 07/27/21 2100   Dressing Dry dressing 07/29/21 0755   Dressing Status Clean;Dry; Intact 07/29/21 0755   Number of days: 2     Closed/Suction Drain Left LLQ Bulb 19 Fr   (Active)   Site Description Unable to view 07/27/21 2140   Dressing Status Clean;Dry; Intact 07/27/21 2140   Drainage Appearance Bloody 07/28/21 2248   Status To bulb suction 07/28/21 2248   Output (mL) 10 mL 07/29/21 1559   Number of days: 2       Ileostomy RLQ (Active)   Stomal Appliance 1 piece;Leaking 07/29/21 1245   Stoma Assessment Bleeding;Pink 07/29/21 1245   Stoma size (in) 1 5 in 07/29/21 1245   Stoma Shape Oval 07/29/21 1245   Peristomal Assessment Intact 07/29/21 1245   Treatment Bag change 07/29/21 1245   Output (mL) 150 mL 07/29/21 1050   Number of days: 2         Please call wound care with questions and concerns, we will continue following  Aria Agosto RN, BSN, [facility]

## 2021-07-29 NOTE — PROGRESS NOTES
Progress Note - Acute Pain Service    Modesto Muñoz 24 y o  male MRN: [id number]  Unit/Bed#: [facility] 816-01 Encounter: [id number]      Assessment:   Principal Problem:    Colitis, ulcerative ([facility] [address] )  Active Problems:    Iron deficiency anemia    Transaminitis    ABLA (acute blood loss anemia)    Severe protein-calorie malnutrition ([facility] [address] )    Modesto Muñoz is a 24 y o  male  with a history of ulcerative colitis  POD#2  Laparoscopic hand assisted subtotal colectomy and end ileostomy  Bilateral tap blocks with Exparel were done preoperatively for postop pain control; no sensory deficits noted on exam   Epidural was not placed as patient was on Lovenox prior to surgery  Patient on clear liquid diet  Plan:   · Tylenol 975 mg every 8 hours schedule   · Robaxin 500 mg every 6 hours scheduled  · Solu-Medrol per primary service   · Dilaudid PCA   · No basal   · PCA dose 0 2 mg   · PCA lockout 6 minutes   · 1 hour limit 2 mg   · Dilaudid 0 5 mg IV every 4 hours as needed for breakthrough pain     Will plan to discontinue PCA tomorrow and transition to oral analgesics  - Bowel regimen when appropriate from surgical perspective      Treatment recommendations discussed with primary care service  APS will continue to follow  Please contact Acute Pain Service - [facility] via [employer] from [phone] with additional questions or concerns  See Jo Ann or Vickey for additional contacts and after hours information  Pain History  Current pain location(s): abdomen   Pain Scale:   2-5  Quality: "fullness/pressure"  24 hour history:   Patient resting in bed, continues to report mild-to-moderate abdominal pain  Dilaudid PCA is effective in pain reduction  Was able to get out of bed last night and walk into the bathroom  Required straight catheterization x1 due to urinary retention  Tolerating current analgesic regimen  Tolerating small amounts of liquid diet      Opioid requirement previous 24 hours: Dilaudid PCA 9 8mg yesterday; overnight 1 8mg    Meds/Allergies   all current active meds have been reviewed and current meds:   Current Facility-Administered Medications   Medication Dose Route Frequency    acetaminophen (TYLENOL) tablet 975 mg  975 mg Oral Q8H [address]    Adult 3-in-1 TPN (custom base / custom electrolytes)   Intravenous Continuous TPN    Adult 3-in-1 TPN (custom base / custom electrolytes)   Intravenous Continuous TPN    heparin (porcine) subcutaneous injection 5,000 Units  5,000 Units Subcutaneous Q8H [address]    HYDROmorphone (DILAUDID) 1 mg/mL 50 mL PCA   Intravenous Continuous    HYDROmorphone (DILAUDID) injection 0 5 mg  0 5 mg Intravenous Q2H PRN    insulin lispro (HumaLOG) 100 units/mL subcutaneous injection 1-6 Units  1-6 Units Subcutaneous TID With Meals    methocarbamol (ROBAXIN) tablet 500 mg  500 mg Oral Q8H [address]    methylPREDNISolone sodium succinate (Solu-MEDROL) injection 20 mg  20 mg Intravenous Q8H [address]    ondansetron (ZOFRAN) injection 4 mg  4 mg Intravenous Q6H PRN    pantoprazole (PROTONIX) injection 40 mg  40 mg Intravenous Q24H CALE    sodium chloride 0 9 % infusion  20 mL/hr Intravenous Continuous    vitamin A capsule 10,000 Units  10,000 Units Oral Daily       No Known Allergies    Objective     Temp:  [97 5 °F (36 4 °C)-98 5 °F (36 9 °C)] 98 °F (36 7 °C)  HR:  [62-85] 74  Resp:  [16-18] 16  BP: (120-127)/(64-66) 123/64    Physical Exam  Vitals reviewed  Constitutional:       General: He is awake  He is not in acute distress  Appearance: He is not ill-appearing, toxic-appearing or diaphoretic  HENT:      Head: Normocephalic and atraumatic  Nose: Nose normal    Eyes:      Extraocular Movements: Extraocular movements intact  Pulmonary:      Effort: Pulmonary effort is normal  No tachypnea, bradypnea or respiratory distress  Musculoskeletal:      Cervical back: Normal range of motion  Neurological:      Mental Status: He is alert and oriented to person, place, and time   Mental status is at baseline  Psychiatric:         Attention and Perception: Attention normal          Mood and Affect: Mood normal          Speech: Speech normal          Behavior: Behavior normal  Behavior is cooperative  Lab Results:   Results from last 7 days   Lab Units 07/29/21  0508   WBC Thousand/uL 9 73   HEMOGLOBIN g/dL 9 7*   HEMATOCRIT % 31 7*   PLATELETS Thousands/uL 191      Results from last 7 days   Lab Units 07/29/21  0508 07/28/21  0532 07/27/21  1906   POTASSIUM mmol/L 3 7 3 6  --    CHLORIDE mmol/L 103 104  --    CO2 mmol/L 30 28  --    CO2, I-STAT mmol/L  --   --  22   BUN mg/dL 10 7  --    CREATININE mg/dL 0 40* 0 44*  --    CALCIUM mg/dL 8 9 8 6  --    ALK PHOS U/L  --  54  --    ALT U/L  --  42  --    AST U/L  --  9  --    GLUCOSE, ISTAT mg/dl  --   --  161*       Please note that the APS provides consultative services regarding pain management only  With the exception of ketamine and epidural infusions and except when indicated, final decisions regarding starting or changing doses of analgesic medications are at the discretion of the consulting service  Off hours consultation and/or medication management is generally not available      J CARLOS Singh  Acute Pain Service

## 2021-07-29 NOTE — PROGRESS NOTES
Progress Note- Yasmin Roach 24 y o  male MRN: [id number]    Unit/Bed#: [facility] 119-38 Encounter: [id number]      Assessment and Plan:  23 y/o male with ulcerative colitis refractory refractory to steroids s/p subtotal colectomy and end ileostomy POD 2  Received accelerated schedule of Remicade - third dose on 7/21  On hydromorphone PCA  Ulcerative colitis refractory to steroids  · Stable - CRP 27 4, Hg improving at 9 7 today  · Continue IV PPI, IV steroid, DVT prophylaxis, pain management  · Monitor CRP  · PO intake is poor - will transition to PO prednisone 60 mg when his tolerance improves, advance diet per surgery recommendations    Protein-calorie malnutrition  · Continue TPN  · Monitor electrolytes    ______________________________________________________________________    Subjective:     Was walking a few times yesterday  On clear liquid diet but only ate lunch yesterday  Feels there is pressure in his stomach when he eats that makes him uncomfortable  Stoma draining brown liquid          Medication Administration - last 24 hours from 07/28/2021 0909 to 07/29/2021 2848       Date/Time Order Dose Route Action Action by     07/28/2021 1127 ondansetron (ZOFRAN) injection 4 mg   Intravenous MAR Unyamileth Garibay PA-C     07/28/2021 0953 lactated ringers infusion 0 mL/hr Intravenous Stopped Jesenia Carlos RN     07/29/2021 0515 heparin (porcine) subcutaneous injection 5,000 Units 5,000 Units Subcutaneous Given Sherman Garcia RN     07/28/2021 2152 heparin (porcine) subcutaneous injection 5,000 Units 5,000 Units Subcutaneous Given Sherman Garcia RN     07/28/2021 1314 heparin (porcine) subcutaneous injection 5,000 Units 5,000 Units Subcutaneous Given Jesenia Carlos RN     07/28/2021 0916 heparin (porcine) subcutaneous injection 5,000 Units 5,000 Units Subcutaneous Given Jesenia Carlos RN     07/29/2021 0500 pantoprazole (PROTONIX) injection 40 mg 40 mg Intravenous Given Sherman Garcia RN 07/29/2021 0510 methylPREDNISolone sodium succinate (Solu-MEDROL) injection 20 mg 20 mg Intravenous Given Hannah Stade, RN     07/28/2021 2147 methylPREDNISolone sodium succinate (Solu-MEDROL) injection 20 mg 20 mg Intravenous Given Hannah Stade, RN     07/28/2021 1314 methylPREDNISolone sodium succinate (Solu-MEDROL) injection 20 mg 20 mg Intravenous Given George Ace, RN     07/29/2021 0513 acetaminophen (TYLENOL) tablet 975 mg 975 mg Oral Given Hannah Stade, RN     07/28/2021 2149 acetaminophen (TYLENOL) tablet 975 mg 975 mg Oral Given Hannah Stade, RN     07/28/2021 1314 acetaminophen (TYLENOL) tablet 975 mg 975 mg Oral Given George Ace, RN     07/29/2021 0514 methocarbamol (ROBAXIN) tablet 500 mg 500 mg Oral Given Hannah Stade, RN     07/28/2021 2149 methocarbamol (ROBAXIN) tablet 500 mg 500 mg Oral Given Hannah Stade, RN     07/28/2021 1314 methocarbamol (ROBAXIN) tablet 500 mg 500 mg Oral Given George Ace, RN     07/29/2021 0856 vitamin A capsule 10,000 Units 10,000 Units Oral Given George Ace, RN     07/28/2021 1140 vitamin A capsule 10,000 Units 10,000 Units Oral Given George Ace, RN     07/28/2021 1002 dextrose 5 % and sodium chloride 0 45 % with KCl 20 mEq/L infusion 20 mL/hr Intravenous [id number] [facility]     07/28/2021 2035 Adult 3-in-1 TPN (custom base / custom electrolytes)   [facility]     07/28/2021 1138 sodium chloride 0 9 % infusion 20 mL/hr Intravenous [id number] [facility]     07/29/2021 0855 insulin lispro (HumaLOG) 100 units/mL subcutaneous injection 1-6 Units 1 Units Subcutaneous Not Given George Ace, RN     07/28/2021 1624 insulin lispro (HumaLOG) 100 units/mL subcutaneous injection 1-6 Units 1 Units Subcutaneous Not Given George Ace, RN     07/28/2021 1146 insulin lispro (HumaLOG) 100 units/mL subcutaneous injection 1-6 Units 1 Units Subcutaneous Not Given George Duggan RN          Objective:     Vitals: Blood pressure 120/64, pulse 73, temperature 98 4 °F (36 9 °C), resp  rate 18, height 6' 3" (1 905 m), weight 96 4 kg (212 lb 8 4 oz), SpO2 97 %  ,Body mass index is 26 56 kg/m²  Intake/Output Summary (Last 24 hours) at 7/29/2021 8728  Last data filed at 7/29/2021 0700  Gross per 24 hour   Intake 1790 77 ml   Output 4120 ml   Net -2329 23 ml       Physical Exam:   General Appearance: Awake and alert, in no acute distress  Abdomen: Mild tenderness, stoma draining brown liquid, incisions clean and dry, soft, non-tender, non-distended; bowel sounds normal; no masses or no organomegaly    Invasive Devices     Peripherally Inserted Central Catheter Line            PICC Line 05/21/37 Right Basilic 7 days          Peripheral Intravenous Line            Peripheral IV 07/27/21 Left Forearm 1 day          Arterial Line            Arterial Line 07/27/21 Right Radial 1 day          Drain            Closed/Suction Drain Left LLQ Bulb 19 Fr  1 day    Ileostomy RLQ 1 day                Lab Results:  No results displayed because visit has over 200 results  Imaging Studies: I have personally reviewed pertinent imaging studies

## 2021-07-30 LAB
ANION GAP SERPL CALCULATED.3IONS-SCNC: 5 MMOL/L (ref 4–13)
BUN SERPL-MCNC: 14 MG/DL (ref 5–25)
CALCIUM SERPL-MCNC: 8.7 MG/DL (ref 8.3–10.1)
CHLORIDE SERPL-SCNC: 103 MMOL/L (ref 100–108)
CO2 SERPL-SCNC: 29 MMOL/L (ref 21–32)
CREAT SERPL-MCNC: 0.37 MG/DL (ref 0.6–1.3)
ERYTHROCYTE [DISTWIDTH] IN BLOOD BY AUTOMATED COUNT: 19 % (ref 11.6–15.1)
GFR SERPL CREATININE-BSD FRML MDRD: 175 ML/MIN/1.73SQ M
GLUCOSE SERPL-MCNC: 121 MG/DL (ref 65–140)
GLUCOSE SERPL-MCNC: 123 MG/DL (ref 65–140)
GLUCOSE SERPL-MCNC: 130 MG/DL (ref 65–140)
GLUCOSE SERPL-MCNC: 133 MG/DL (ref 65–140)
GLUCOSE SERPL-MCNC: 72 MG/DL (ref 65–140)
HCT VFR BLD AUTO: 29.1 % (ref 36.5–49.3)
HGB BLD-MCNC: 8.9 G/DL (ref 12–17)
MCH RBC QN AUTO: 28.3 PG (ref 26.8–34.3)
MCHC RBC AUTO-ENTMCNC: 30.6 G/DL (ref 31.4–37.4)
MCV RBC AUTO: 92 FL (ref 82–98)
PLATELET # BLD AUTO: 188 THOUSANDS/UL (ref 149–390)
PMV BLD AUTO: 11 FL (ref 8.9–12.7)
POTASSIUM SERPL-SCNC: 3.9 MMOL/L (ref 3.5–5.3)
PREALB SERPL-MCNC: 31.4 MG/DL (ref 18–40)
RBC # BLD AUTO: 3.15 MILLION/UL (ref 3.88–5.62)
SODIUM SERPL-SCNC: 137 MMOL/L (ref 136–145)
WBC # BLD AUTO: 6.79 THOUSAND/UL (ref 4.31–10.16)

## 2021-07-30 PROCEDURE — 84134 ASSAY OF PREALBUMIN: CPT | Performed by: PHYSICIAN ASSISTANT

## 2021-07-30 PROCEDURE — 80048 BASIC METABOLIC PNL TOTAL CA: CPT | Performed by: STUDENT IN AN ORGANIZED HEALTH CARE EDUCATION/TRAINING PROGRAM

## 2021-07-30 PROCEDURE — 99232 SBSQ HOSP IP/OBS MODERATE 35: CPT | Performed by: NURSE PRACTITIONER

## 2021-07-30 PROCEDURE — 85027 COMPLETE CBC AUTOMATED: CPT | Performed by: STUDENT IN AN ORGANIZED HEALTH CARE EDUCATION/TRAINING PROGRAM

## 2021-07-30 PROCEDURE — 97163 PT EVAL HIGH COMPLEX 45 MIN: CPT

## 2021-07-30 PROCEDURE — 82948 REAGENT STRIP/BLOOD GLUCOSE: CPT

## 2021-07-30 PROCEDURE — C9113 INJ PANTOPRAZOLE SODIUM, VIA: HCPCS | Performed by: PHYSICIAN ASSISTANT

## 2021-07-30 PROCEDURE — 99232 SBSQ HOSP IP/OBS MODERATE 35: CPT | Performed by: INTERNAL MEDICINE

## 2021-07-30 PROCEDURE — 97166 OT EVAL MOD COMPLEX 45 MIN: CPT

## 2021-07-30 PROCEDURE — 97535 SELF CARE MNGMENT TRAINING: CPT

## 2021-07-30 RX ADMIN — ACETAMINOPHEN 975 MG: 325 TABLET, FILM COATED ORAL at 05:14

## 2021-07-30 RX ADMIN — PANTOPRAZOLE SODIUM 40 MG: 40 INJECTION, POWDER, FOR SOLUTION INTRAVENOUS at 05:14

## 2021-07-30 RX ADMIN — HEPARIN SODIUM 5000 UNITS: 5000 INJECTION INTRAVENOUS; SUBCUTANEOUS at 21:53

## 2021-07-30 RX ADMIN — ACETAMINOPHEN 975 MG: 325 TABLET, FILM COATED ORAL at 21:52

## 2021-07-30 RX ADMIN — HEPARIN SODIUM 5000 UNITS: 5000 INJECTION INTRAVENOUS; SUBCUTANEOUS at 13:43

## 2021-07-30 RX ADMIN — Medication 10000 UNITS: at 08:12

## 2021-07-30 RX ADMIN — METHOCARBAMOL 500 MG: 500 TABLET, FILM COATED ORAL at 13:43

## 2021-07-30 RX ADMIN — METHOCARBAMOL 500 MG: 500 TABLET, FILM COATED ORAL at 05:14

## 2021-07-30 RX ADMIN — METHOCARBAMOL 500 MG: 500 TABLET, FILM COATED ORAL at 21:53

## 2021-07-30 RX ADMIN — METHYLPREDNISOLONE SODIUM SUCCINATE 20 MG: 40 INJECTION, POWDER, FOR SOLUTION INTRAMUSCULAR; INTRAVENOUS at 13:43

## 2021-07-30 RX ADMIN — HEPARIN SODIUM 5000 UNITS: 5000 INJECTION INTRAVENOUS; SUBCUTANEOUS at 05:14

## 2021-07-30 RX ADMIN — ASCORBIC ACID: 500 INJECTION INTRAVENOUS at 21:53

## 2021-07-30 RX ADMIN — METHYLPREDNISOLONE SODIUM SUCCINATE 20 MG: 40 INJECTION, POWDER, FOR SOLUTION INTRAMUSCULAR; INTRAVENOUS at 05:14

## 2021-07-30 RX ADMIN — METHYLPREDNISOLONE SODIUM SUCCINATE 20 MG: 40 INJECTION, POWDER, FOR SOLUTION INTRAMUSCULAR; INTRAVENOUS at 21:53

## 2021-07-30 RX ADMIN — ACETAMINOPHEN 975 MG: 325 TABLET, FILM COATED ORAL at 13:43

## 2021-07-30 NOTE — OCCUPATIONAL THERAPY NOTE
Occupational Therapy Evaluation     Patient Name: Concha Zafar  QRWML'L Date: 7/30/2021  Problem List  Principal Problem:    Colitis, ulcerative ([facility] [address] )  Active Problems:    Iron deficiency anemia    Transaminitis    ABLA (acute blood loss anemia)    Severe protein-calorie malnutrition ([facility] [address] )    Past Medical History  Past Medical History:   Diagnosis Date    [facility]      Past Surgical History  Past Surgical History:   Procedure Laterality Date    HEMICOLOECTOMY W/ ANASTOMOSIS N/A 7/27/2021    Procedure: LAPAROSCOPIC HAND ASSISTED SUBCOLECTOMY; END ILEOSTOMY;  Surgeon: Johana Ospina MD;  Location: [facility];  Service: Colorectal             07/30/21 1042   OT Last Visit   OT Visit Date 07/30/21   Note Type   Note type Evaluation   Restrictions/Precautions   Weight Bearing Precautions Per Order No   Other Precautions Multiple lines; Fall Risk;Pain   Pain Assessment   Pain Assessment Tool 0-10   Pain Score 3   Pain Location/Orientation Location: [facility]   Hospital Pain Intervention(s) Ambulation/increased activity;Repositioned   Home Living   Type of [address] [place] [address] Two level;1/2 bath on main level;Bed/bath upstairs   Bathroom Shower/Tub Tub/shower unit   Bathroom Toilet Standard   Bathroom Equipment   (Denies)   Home Equipment   (Denies)   Additional Comments Pt lives with his mother in [facility] with 1 LUCY  Pt with tub/shower and denies any DME  Prior Function   Level of Gay Independent with ADLs and functional mobility   Lives With [facility] Help From Family   ADL Assistance Independent   IADLs Independent   Falls in the last 6 months 1 to 4  (3 per Pt report)   Vocational Student   Comments PTA, Pt I with all ADLs/most IADLs (mother assists)  Pt ambulates with no AD and attends [employer] during school year  Lifestyle   Autonomy I with ADLs/IADLs/drives no AD   Reciprocal Relationships Pt with supportive mother     Service to Others Pt is a student at [employer] and is going into his 4th year of school  Pt majors in engineering  Intrinsic Gratification Pt reports he enjoys [employer]  Psychosocial   Psychosocial (WDL) X   Patient Behaviors/Mood Calm;Sad;Fearful;Flat affect   ADL   Where Assessed Edge of bed   Eating Assistance 6  Modified independent   Grooming Assistance 5  Supervision/Setup   UB Bathing Assistance 4  Minimal Assistance   LB Bathing Assistance 3  Moderate Assistance   [address] 4  Minimal [address] 4  [address] 4  Minimal Assistance   Bed Mobility   Supine to Sit 4  Minimal assistance   Additional items Assist x 1;HOB elevated; Increased time required   Sit to Supine Unable to assess   Additional Comments Pt greeted bedside for OT eval and tx  Pt left OOB in recliner chair at end of session  Transfers   Sit to Stand 4  Minimal assistance   Additional items Assist x 1; Increased time required;Verbal cues   Stand to Sit 4  Minimal assistance   Additional items Assist x 1; Increased time required;Verbal cues   Toilet transfer 4  Minimal assistance   Additional items Assist x 1;Standard toilet  (GB)   Functional Mobility   Functional Mobility 4  Minimal assistance   Additional Comments Pt completes functional mobility with RW at min Ax1  Balance   Static Sitting Fair +   Dynamic Sitting Fair   Static Standing Fair -   Dynamic Standing Fair -   Ambulatory Poor +   Activity Tolerance   Activity Tolerance Patient limited by fatigue;Patient limited by pain   Medical Staff Made Aware PT- Co-eval performed 2* to increased medical complexity and multiple co-morbidities  OT treatment after eval completed  Nurse Made Aware RN cleared and updated at end of session  RUE Assessment   RUE Assessment WFL   LUE Assessment   LUE Assessment WFL   Sensation   Additional Comments Pt with c/o of B/L shoulders feeling "heavy " Pt 3-/5 for B/L total UE MMT  Pt with noted weakness and decreased endurance throughout session  Cognition   Overall Cognitive Status WFL   Arousal/Participation Alert; Cooperative   Orientation Level Oriented X4   Memory Within functional limits   Following Commands Follows multistep commands with increased time or repetition   Comments Pt cooperative to participate in therapy  Pt with flat affect and decreased motivation to complete self-care tasks  Assessment   Limitation Decreased ADL status; Decreased UE ROM; Decreased UE strength;Decreased Safe judgement during ADL;Decreased endurance;Decreased self-care trans;Decreased high-level ADLs   Prognosis Fair   Assessment Pt is a 25 yo Male who presented to [address] [place] [address] on 7/2/2021 with s/p syncopal episode  Pt with diagnosis of ulcerative colitis  Pt [facility] on 7/15/2021 and on 7/27/2021 Pt had "LAPAROSCOPIC Hand Assist subtotal colectomy End ileostomy" procedure  Pt greeted bedside for OT evaluation on 7/30/2021  Pt lives with his mother in [facility] with 1 LUCY  Pt with tub/shower and denies any DME  PTA, Pt I with all ADLs/most IADLs (mother assists)  Pt ambulates with no AD and attends [employer] during school year  Pt completes UB/LB ADLs with min A  Pt completes bed mobility with min Ax1 and functional transfers with min Ax1  Pt completes functional mobility with RW at min Ax1  Pt presents with decreased strength/endurance and pain limiting participation in self care tasks  Limitations that impact functional performance include decreased ADL status, decreased UE ROM, decreased UE strength, decreased safe judgement during ADLs, decreased endurance, decreased self care transfers, decreased high level ADLs and pain  Occupational performance areas to address ADL retraining, functional transfer training, UE strengthening/ROM, endurance training, Pt/caregiver education, compensatory technique education, energy conservation and activity engagement    Pt would benefit from continued skilled OT services while in hospital to maximize independence with ADLs  Will continue to follow Pt's progress  Pt would benefit from home with increased support from family upon DC to maximize safety and independence with ADLs and functional tasks of choice  Goals   Patient Goals To get stronger   LTG Time Frame 10-14   Long Term Goal #1 See goals listed below  Plan   Treatment Interventions ADL retraining;Functional transfer training;UE strengthening/ROM; Endurance training;Patient/family training; Compensatory technique education; Energy conservation; Activityengagement   Goal Expiration Date 08/13/21   OT Treatment Day 1   OT Frequency 3-5x/wk   Additional Treatment Session   Start Time 1001   End Time 1042   Treatment Assessment Pt completes toileting routine with min A (Transfer min A with VC/GB use, hygiene S and clothing management S)  Pt completed UB/LB bathing seated in bedside chair  Pt demonstrated lack of initiation in task 2* to increased pain with movement and weakness of B/L UE  Pt completed UB bathing at min A level and LB bathing at mod A seated  Pt completed UB dressing with min A  Overall, Pt requires increased time to participate in task  Pt educated on energy conservation techniques  Additional Treatment Day 1   Recommendation   OT Discharge Recommendation No rehabilitation needs  (Increased family support)   OT - OK to Discharge Yes   Additional Comments  The patient's raw score on the AM-PAC Daily Activity inpatient short form is 18, standardized score is 38 66, less than 39 4  Patients at this level are likely to benefit from discharge to post-acute rehabilitation services  Please refer to the recommendation of the Occupational Therapist for safe discharge planning     AM-PAC Daily Activity Inpatient   Lower Body Dressing 3   Bathing 2   Toileting 3   Upper Body Dressing 3   Grooming 3   Eating 4   Daily Activity Raw Score 18   Daily Activity Standardized Score (Calc for Raw Score >=11) 38 66 AM-PAC Applied Cognition Inpatient   Following a Speech/Presentation 4   Understanding Ordinary Conversation 4   Taking Medications 4   Remembering Where Things Are Placed or Put Away 4   Remembering List of 4-5 Errands 4   Taking Care of Complicated Tasks 3   Applied Cognition Raw Score 23   Applied Cognition Standardized Score 53 08   Modified [place] Scale   Modified [place] Scale 4     Goals:  1  Pt will complete UB ADLs with I in order to maximize participation with ADLs  2  Pt will complete LB ADLs with I in order to maximize safety with ADLs  3  Pt will complete toileting routine (transfer, hygiene, and clothing management) with I in order to return to prior level of function  4  Pt will complete bed mobility with I in order to maximize participation with ADLs  5  Pt will complete functional transfers at I level in order to increase participation with ADLs  6  Pt will increase dynamic standing balance to F+ in order to increase safety with ADLs  7  Pt will increase standing tolerance x5 min in order to increase participation with ADLs  8  Pt will complete functional mobility with AD PRN for item retrieval task at [place] level in order to increase participation with ADLs  9  Pt will complete IADL tasks/simulation of IADLs tasks with I in order to return to PLOF  10  Pt will be attentive 100% of the time for ongoing functional/formal cognitive assessment to assist with safe dc planning prn  11  Pt will follow multistep directions 100% of time during OT session       Nuria Real MS, OTR/L

## 2021-07-30 NOTE — PROGRESS NOTES
Progress Note - Colorectal   Dylan Miller 24 y o  male MRN: [id number]  Unit/Bed#: [facility] 816-01 Encounter: [id number]      Objective:  Patient states he feels better this morning  Is better pain controlled  Was out of bed ambulating more yesterday  And has been voiding well on his own without having to be straight cathed  Denies nausea, no emesis  Ileostomy has started to function now  Has leaking from around his MEGHANA site  Ileostomy teaching has started  Patient is able to empty his own bag  Patient is tolerating clears toast and crackers  Patient continues on Solu-Medrol 20 mg IV q 8 hours  2100 UA  10 MEGHANA  275 ileostomy    Blood pressure 112/64, pulse 65, temperature 98 °F (36 7 °C), temperature source Axillary, resp  rate 18, height 6' 3" (1 905 m), weight 96 4 kg (212 lb 8 4 oz), SpO2 97 %  ,Body mass index is 26 56 kg/m²        Intake/Output Summary (Last 24 hours) at 7/30/2021 0535  Last data filed at 7/30/2021 0001  Gross per 24 hour   Intake 124 ml   Output 2385 ml   Net -2261 ml       Invasive Devices     Peripherally Inserted Central Catheter Line            PICC Line 85/40/03 Right Basilic 8 days          Peripheral Intravenous Line            Peripheral IV 07/27/21 Left Forearm 2 days          Arterial Line            Arterial Line 07/27/21 Right Radial 2 days          Drain            Closed/Suction Drain Left LLQ Bulb 19 Fr  2 days    Ileostomy RLQ 2 days                Physical Exam:   Abdomen:  Patient is still very sensitive to palpating his abdomen, midline incision is clean and dry infraumbilical   Ileostomy stoma is pink and healthy there was lots of air in the bag, dark brown fluid in the bag, MEGHANA drain left abdomen is serous, dressing changed, abdomen is does not appear distended and is soft  Extremities:  No calf tenderness no pedal edema bilaterally    Lab, Imaging and other studies:  Pending  VTE Pharmacologic Prophylaxis: Heparin  VTE Mechanical Prophylaxis: sequential compression device    Assessment:  POD # 3 laparoscopic subtotal colectomy, end ileostomy  Ulcerative colitis  Anemia    Plan:  1 ?  Increase to low residue diet  2  ?  DC PCA and use oral pain medications  3  ?  Switch to prednisone  4  Continue to be out of bed and ambulating the halls for the majority of the day  5  Continue incentive spirometry  6  Continue ileostomy care and teaching   7   Monitor ileostomy output

## 2021-07-30 NOTE — PLAN OF CARE
Problem: OCCUPATIONAL THERAPY ADULT  Goal: Performs self-care activities at highest level of function for planned discharge setting  See evaluation for individualized goals  Description: Treatment Interventions: ADL retraining, Functional transfer training, UE strengthening/ROM, Endurance training, Patient/family training, Compensatory technique education, Energy conservation, Activityengagement          See flowsheet documentation for full assessment, interventions and recommendations  Note: Limitation: Decreased ADL status, Decreased UE ROM, Decreased UE strength, Decreased Safe judgement during ADL, Decreased endurance, Decreased self-care trans, Decreased high-level ADLs  Prognosis: Fair  Assessment: Pt is a 23 yo Male who presented to [address] [place] [address] on 7/2/2021 with s/p syncopal episode  Pt with diagnosis of ulcerative colitis  Pt [facility] [place] [facility]/2021 and on 7/27/2021 Pt had "LAPAROSCOPIC Hand Assist subtotal colectomy End ileostomy" procedure  Pt greeted bedside for OT evaluation on 7/30/2021  Pt lives with his mother in [facility] with 1 LUCY  Pt with tub/shower and denies any DME  PTA, Pt I with all ADLs/most IADLs (mother assists)  Pt ambulates with no AD and attends [employer] during school year  Pt completes UB/LB ADLs with min A  Pt completes bed mobility with min Ax1 and functional transfers with min Ax1  Pt completes functional mobility with RW at min Ax1  Pt presents with decreased strength/endurance and pain limiting participation in self care tasks  Limitations that impact functional performance include decreased ADL status, decreased UE ROM, decreased UE strength, decreased safe judgement during ADLs, decreased endurance, decreased self care transfers, decreased high level ADLs and pain   Occupational performance areas to address ADL retraining, functional transfer training, UE strengthening/ROM, endurance training, Pt/caregiver education, compensatory technique education, energy conservation and activity engagement   Pt would benefit from continued skilled OT services while in hospital to maximize independence with ADLs  Will continue to follow Pt's progress  Pt would benefit from home with increased support from family upon DC to maximize safety and independence with ADLs and functional tasks of choice  OT Discharge Recommendation: No rehabilitation needs (Increased family support)  OT - OK to Discharge:  Yes

## 2021-07-30 NOTE — PLAN OF CARE
Problem: PHYSICAL THERAPY ADULT  Goal: Performs mobility at highest level of function for planned discharge setting  See evaluation for individualized goals  Description: Treatment/Interventions: Functional transfer training, LE strengthening/ROM, Endurance training, Patient/family training, Equipment eval/education, Bed mobility, Gait training, Spoke to nursing, Spoke to case management, OT  Equipment Recommended: Brooke Giraldo (pending progress )       See flowsheet documentation for full assessment, interventions and recommendations  7/30/2021 1038 by Julia Corbett PT  Note: Prognosis: Good  Problem List: Decreased strength, Decreased range of motion, Decreased endurance, Impaired balance, Decreased mobility  Assessment: Pt seen for high complexity PT evaluation  Pt with active PT eval/treat orders  Pt is a 24 y o  male who was admitted to [facility] on 7/15/2021 with colitis, ulcerative  Pt s/p "LAPAROSCOPIC HAND ASSISTED SUBCOLECTOMY; END ILEOSTOMY (N/A)" on 7/27  Pt's active problems include: iron deficiency anemia, transamitis, ABLA, and severe protein calorie malnutrition  Pt  has a past medical history of Colitis, ulcerative ([facility] [address] )  Pt resides with family in 2  with 1 [facility] and was independent prior to hospital admission  Pt has limitations in strength, balance, endurance, and overall functional mobility  Pt requires assist of 1 for all mobility performed this date  Pt performed bed mobility tasks with min Ax1  Pt sat EOB at close S level  Pt performed STS from EOB to RW with min Ax1  Pt ambulated into hallway with RW and min Ax1; cues for RW management, sequencing and safety required  Distance limited 2* fatigue- deferred further mobility this date  Pt was left sitting upright in bedside chair with OT present at the end of PT session with all needs in reach  Pt would benefit from continued PT services while in hospital to address remaining limitations   PT to continue to follow pt and recommends home with increased social support and OPPT   The patient's AM-PAC Basic Mobility Inpatient Short Form Raw Score is 17, Standardized Score is 39 67  A standardized score less than 42 9 suggests the patient may benefit from discharge to post-acute rehabilitation services  Please also refer to the recommendation of the Physical Therapist for safe discharge planning  PT Discharge Recommendation: Home with outpatient rehabilitation (+ increased social support )     PT - OK to Discharge: No (pending stair trial )    See flowsheet documentation for full assessment

## 2021-07-30 NOTE — PHYSICAL THERAPY NOTE
Physical Therapy Evaluation     Patient's Name: [facility]    Admitting Diagnosis  Ulcerative colitis ([facility] ) [K51 [id number]]    Problem List  Patient Active Problem List   Diagnosis    Rectal bleeding    Iron deficiency anemia    Colitis, ulcerative ([facility] )    Syncope    Transaminitis    ABLA (acute blood loss anemia)    Severe protein-calorie malnutrition ([facility] )       Past Medical History  Past Medical History:   Diagnosis Date    Colitis, ulcerative ([facility] )        Past Surgical History  Past Surgical History:   Procedure Laterality Date    HEMICOLOECTOMY W/ ANASTOMOSIS N/A 7/27/2021    Procedure: LAPAROSCOPIC HAND ASSISTED SUBCOLECTOMY; END ILEOSTOMY;  Surgeon: George Allison MD;  [facility]: [facility];  Service: Colorectal        07/30/21 1018   PT Last Visit   PT Visit Date 07/30/21   Note Type   Note type Evaluation   Pain Assessment   Pain Assessment Tool Pain Assessment not indicated - pt denies pain   Pain Score No Pain   Home Living   Type of Home House  (1 LUCY; 5+5 steps to bedroom )   Home Layout Two level;1/2 bath on main level;Bed/bath upstairs   Bathroom Shower/Tub Tub/shower unit   Bathroom Toilet Standard   Prior Function   Level of [place] Independent with ADLs and functional mobility   Lives With [employer] Help From Family   ADL Assistance Independent   IADLs Independent   Falls in the last 6 months 1 to 4  (3 per pt report )   Restrictions/Precautions   Weight Bearing Precautions Per Order No   Other Precautions Multiple lines; Fall Risk   General   Family/Caregiver Present Yes   Cognition   Overall Cognitive Status WFL   Arousal/Participation Cooperative   Orientation Level Oriented X4   Memory Within functional limits   Following Commands Follows multistep commands with increased time or repetition   Comments Pt pleasant and cooperative to participate in therapy session    RLE Assessment   RLE Assessment   (3+/5)   LLE Assessment   LLE Assessment   (3+/5 )   Bed Mobility Supine to Sit 4  Minimal assistance   Additional items Assist x 1;HOB elevated; Bedrails; Increased time required   Sit to Supine Unable to assess   Additional Comments Pt supine in bed upon arrival  Pt sitting upright in bedside chair with OT present in room and all needs within reach at the end of therapy session    Transfers   Sit to Stand 4  Minimal assistance   Additional items Assist x 1; Increased time required;Verbal cues   Stand to Sit 4  Minimal assistance   Additional items Assist x 1; Increased time required;Verbal cues   Toilet transfer 4  Minimal assistance   Additional items Assist x 1; Increased time required;Standard toilet   Additional Comments Transfers with RW    Ambulation/Elevation   Gait pattern Excessively slow; Short stride; Foward flexed;Decreased foot clearance;Narrow YESSICA   Gait Assistance 4  Minimal assist   Additional items Assist x 1   Assistive Device Rolling walker   Distance 2 x 40ft    Stair Management Assistance Not tested   Balance   Static Sitting Fair +   Dynamic Sitting Fair   Static Standing Fair -   Dynamic Standing Fair -   Ambulatory Poor +   Endurance Deficit   Endurance Deficit Yes   Endurance Deficit Description fatigue, deconditioning, weakness    Activity Tolerance   Activity Tolerance Patient limited by fatigue   Medical Staff Made Aware OT; co-eval performed 2* increased medical complexity and multiple co-morbidities    Nurse Made Aware RN cleared pt to participate in therapy session    Assessment   Prognosis Good   Problem List Decreased strength;Decreased range of motion;Decreased endurance; Impaired balance;Decreased mobility   Assessment Pt seen for high complexity PT evaluation  Pt with active PT eval/treat orders  Pt is a 24 y o  male who was admitted to [facility] on 7/15/2021 with colitis, ulcerative  Pt s/p "LAPAROSCOPIC HAND ASSISTED SUBCOLECTOMY; END ILEOSTOMY (N/A)" on 7/27   Pt's active problems include: iron deficiency anemia, transamitis, ABLA, and severe protein calorie malnutrition  Pt  has a past medical history of Colitis, ulcerative ([address] )  Pt resides with family in 2  with 1 LUCY and was independent prior to hospital admission  Pt has limitations in strength, balance, endurance, and overall functional mobility  Pt requires assist of 1 for all mobility performed this date  Pt performed bed mobility tasks with min Ax1  Pt sat EOB at close S level  Pt performed STS from EOB to RW with min Ax1  Pt ambulated into hallway with RW and min Ax1; cues for RW management, sequencing and safety required  Distance limited 2* fatigue- deferred further mobility this date  Pt was left sitting upright in bedside chair with OT present at the end of PT session with all needs in reach  Pt would benefit from continued PT services while in hospital to address remaining limitations  PT to continue to follow pt and recommends home with increased social support and OPPT   The patient's AM-PAC Basic Mobility Inpatient Short Form Raw Score is 17, Standardized Score is 39 67  A standardized score less than 42 9 suggests the patient may benefit from discharge to post-acute rehabilitation services  Please also refer to the recommendation of the Physical Therapist for safe discharge planning  Goals   Patient Goals to get stronger    STG Expiration Date 08/13/21   Short Term Goal #1 STG 1  Pt will be able to perform bed mobility with mod I in order to improve overall functional mobility and assist in safe d/c  STG 2  Pt will be able to perform functional transfer with mod I in order to improve overall functional mobility and assist in safe d/c  STG 3  Pt will be able to ambulate at least 250 feet with least restrictive device with mod I A in order to improve overall functional mobility and assist in safe d/c  STG 4  Pt will improve sitting/standing static/dynamic balance 1 grade in order to improve functional mobility and assist in safe d/c  STG 5   Pt will improve LE strength by one grade in order to improve functional mobility and assist in safe d/c  STG 6  Pt will negotiate at least 10 steps at mod I level A in order to improve overall functional mobility and assist in safe d/c     PT Treatment Day 0   Plan   Treatment/Interventions Functional transfer training;LE strengthening/ROM; Endurance training;Patient/family training;Equipment eval/education; Bed mobility;Gait training;Spoke to nursing;Spoke to case management;OT   PT Frequency Other (Comment)  (3-5x/wk )   Recommendation   PT Discharge Recommendation Home with outpatient rehabilitation  (+ increased social support )   Equipment Recommended Walker  (pending progress )   PT - OK to Discharge No  (pending stair trial )   AM-PAC Basic Mobility Inpatient   Turning in Bed Without Bedrails 3   Lying on Back to Sitting on Edge of Flat Bed 3   Moving Bed to Chair 3   Standing Up From Chair 3   Walk in Room 3   Climb 3-5 Stairs 2   Basic Mobility Inpatient Raw Score 17   Basic Mobility Standardized Score 39 67           Isidro Smith, PT, DPT

## 2021-07-30 NOTE — PROGRESS NOTES
Progress Note- Benji Antonio 24 y o  male MRN: [id number]    Unit/Bed#: [facility] 701-92 Encounter: [id number]      Assessment and Plan:    23 y/o male with severe ulcerative colitis refractory to steroids s/p subtotal colectomy with end ileostomy  POD 3  Received an accelerated schedule of Remicade with the third dose on 7/21  On IV steroid  PO intake progressing with clear liquids with toast       Ulcerative colitis refractory to steroids  · S/p subtotal colectomy with end ileostomy POD 3   · Tolerating liquids and some solid food - continue to monitor PO tolerance, will switch to prednisone tomorrow if he continues to improve  · Continue methylprednisolone, IVF, PPI, DVT prophylaxis  · Continue pain management per surgery recommendations    Protein-calorie malnutrition  · Continue TPN   ______________________________________________________________________    Subjective: Ate 1 piece of toast and jello yesterday  Diet advanced to clear liquids with toast and crackers  Did not eat yet this morning  Continues to walk to the bathroom to urinate and empty ileostomy bag        Medication Administration - last 24 hours from 07/29/2021 0822 to 07/30/2021 8059       Date/Time Order Dose Route Action Action by     07/30/2021 0000 HYDROmorphone (DILAUDID) 1 mg/mL 50 mL PCA   Intravenous Rate/Dose Verify Ramos Moncada     07/30/2021 0514 heparin (porcine) subcutaneous injection 5,000 Units 5,000 Units Subcutaneous Given Ayanna Morrow RN     07/29/2021 2225 heparin (porcine) subcutaneous injection 5,000 Units 5,000 Units Subcutaneous Given Cleveland Arellano RN     07/29/2021 1425 heparin (porcine) subcutaneous injection 5,000 Units 5,000 Units Subcutaneous Given Joy Mcgrath RN     07/30/2021 0514 pantoprazole (PROTONIX) injection 40 mg 40 mg Intravenous Given Ayanna Morrow RN     07/30/2021 1789 methylPREDNISolone sodium succinate (Solu-MEDROL) injection 20 mg 20 mg Intravenous Given Ayanna oMrrow RN 07/29/2021 2226 methylPREDNISolone sodium succinate (Solu-MEDROL) injection 20 mg 20 mg Intravenous Given Brien Day, FABIOLA     07/29/2021 1425 methylPREDNISolone sodium succinate (Solu-MEDROL) injection 20 mg 20 mg Intravenous Given Pamla Gutjefferson, RN     07/30/2021 0514 acetaminophen (TYLENOL) tablet 975 mg 975 mg Oral Given Laura Woodruff, FABIOLA     07/29/2021 2225 acetaminophen (TYLENOL) tablet 975 mg 975 mg Oral Given Brien Day, RN     07/29/2021 1425 acetaminophen (TYLENOL) tablet 975 mg 975 mg Oral Given Pamla Gutting, RN     07/30/2021 0514 methocarbamol (ROBAXIN) tablet 500 mg 500 mg Oral Given Laura Cruzas, RN     07/29/2021 2225 methocarbamol (ROBAXIN) tablet 500 mg 500 mg Oral Given Brien Day, FABIOLA     07/29/2021 1425 methocarbamol (ROBAXIN) tablet 500 mg 500 mg Oral Given Pamla Gutting, RN     07/30/2021 6159 vitamin A capsule 10,000 Units 10,000 Units Oral Given Camron Bermudez, RN     07/29/2021 5693 vitamin A capsule 10,000 Units 10,000 Units Oral Given Camron Bermudez, FABIOLA     07/30/2021 6332 insulin lispro (HumaLOG) 100 units/mL subcutaneous injection 1-6 Units 1 Units Subcutaneous Not Given Camron Bermudez, FABIOLA     07/29/2021 1651 insulin lispro (HumaLOG) 100 units/mL subcutaneous injection 1-6 Units 1 Units Subcutaneous Not Given Camron Bermudez, FABIOLA     07/29/2021 1102 insulin lispro (HumaLOG) 100 units/mL subcutaneous injection 1-6 Units 1 Units Subcutaneous Not Given Camron Bermudez, FABIOLA     07/29/2021 0855 insulin lispro (HumaLOG) 100 units/mL subcutaneous injection 1-6 Units 1 Units Subcutaneous Not Given Camron Bermudez RN     07/29/2021 2005 Adult 3-in-1 TPN (custom base / custom electrolytes)   Intravenous [address], FABIOLA     07/29/2021 1103 potassium chloride (K-DUR,KLOR-CON) CR tablet 40 mEq 40 mEq Oral Given Camron Bermudez RN          Objective:     Vitals: Blood pressure 113/64, pulse 70, temperature 98 2 °F (36 8 °C), resp   rate 20, height 6' 3" (1 905 m), weight 96 4 kg (212 lb 8 4 oz), SpO2 97 %  ,Body mass index is 26 56 kg/m²  Intake/Output Summary (Last 24 hours) at 7/30/2021 4408  Last data filed at 7/30/2021 0700  Gross per 24 hour   Intake 122 6 ml   Output 2385 ml   Net -2262 4 ml       Physical Exam:   General Appearance: Awake and alert, in no acute distress  Abdomen: Tender, ileostomy bag contains brown liquid, soft, non-distended; bowel sounds normal; no masses or no organomegaly    Invasive Devices     Peripherally Inserted Central Catheter Line            PICC Line 14/31/37 Right Basilic 8 days          Peripheral Intravenous Line            Peripheral IV 07/27/21 Left Forearm 2 days          Drain            Closed/Suction Drain Left LLQ Bulb 19 Fr  2 days    Ileostomy RLQ 2 days                Lab Results:  No results displayed because visit has over 200 results  Imaging Studies: I have personally reviewed pertinent imaging studies

## 2021-07-30 NOTE — PROGRESS NOTES
Progress Note - Acute Pain Service    Del Ruiz 24 y o  male MRN: [id number]  Unit/Bed#: [facility] 816-01 Encounter: [id number]      Assessment:   Principal Problem:    Colitis, ulcerative ([facility] [address] )  Active Problems:    Iron deficiency anemia    Transaminitis    ABLA (acute blood loss anemia)    Severe protein-calorie malnutrition ([facility] [address] )    Del Ruiz is a 24 y o  male with a history of ulcerative colitis   POD#3  Laparoscopic hand assisted subtotal colectomy and end ileostomy  Bilateral tap blocks with Exparel were done preoperatively for postop pain control; no sensory deficits noted on exam   Epidural was not placed as patient was on Lovenox prior to surgery  Patient on clear liquid diet; tolerating small amounts of liquid  Per colo-rectal will continue to introduce oral slowly; plan to transition to oral analgesics tomorrow       Plan:   · Tylenol 975 mg every 8 hours scheduled   · Robaxin 500 mg every 6 hours scheduled  · Solu-Medrol per primary service   · Dilaudid PCA  · No basal   · PCA dose 0 2 mg   · PCA lockout 6 minutes   · 1 hour limit 2 mg  · Dilaudid 0 5 mg IV every 4 hours as needed for breakthrough pain    Will plan to discontinue PCA tomorrow and transition to oral analgesic regimen as outlined below:  · Tylenol 975 mg every 8 hours scheduled   · Robaxin 500 mg every 6 hours scheduled   · Discontinue Dilaudid PCA  · Change Dilaudid to 0 2 mg IV every 4 hours as needed for breakthrough pain  · Add oxycodone 2 5 mg every 4 hours as needed for moderate pain  · Add oxycodone 5 mg every 4 hours as needed for severe pain    - Bowel regimen when appropriate from surgical perspective    Treatment recommendations discussed with primary care service  APS will continue to follow  Please contact Acute Pain Service - [facility] via [employer] from [phone] with additional questions or concerns  See [employer] or [employer]key for additional contacts and after hours information      Pain History  Current pain location(s): abdomen   Pain Scale:  0-3  Quality:   Fullness  24 hour history:  Patient resting in bed, no acute distress  Continues to report mild abdominal pain, overall appears significantly more comfortable today compared to the last 24-48 hours  Physical therapy is at bedside  Patient has been getting out of bed to the bathroom overnight  Stating he is urinating without difficulty  Dilaudid PCA is affected in pain reduction  Has not required breakthrough doses of Dilaudid  Tolerating small amounts of clear liquid diet  Opioid requirement previous 24 hours:  Dilaudid PCA total 1 6 mg    Meds/Allergies   all current active meds have been reviewed and current meds:   Current Facility-Administered Medications   Medication Dose Route Frequency    acetaminophen (TYLENOL) tablet 975 mg  975 mg Oral Q8H [address]    Adult 3-in-1 TPN (custom base / custom electrolytes)   Intravenous Continuous TPN    heparin (porcine) subcutaneous injection 5,000 Units  5,000 Units Subcutaneous Q8H [address]    HYDROmorphone (DILAUDID) 1 mg/mL 50 mL PCA   Intravenous Continuous    HYDROmorphone (DILAUDID) injection 0 5 mg  0 5 mg Intravenous Q2H PRN    insulin lispro (HumaLOG) 100 units/mL subcutaneous injection 1-6 Units  1-6 Units Subcutaneous TID With Meals    methocarbamol (ROBAXIN) tablet 500 mg  500 mg Oral Q8H [address]    methylPREDNISolone sodium succinate (Solu-MEDROL) injection 20 mg  20 mg Intravenous Q8H [address]    ondansetron (ZOFRAN) injection 4 mg  4 mg Intravenous Q6H PRN    pantoprazole (PROTONIX) injection 40 mg  40 mg Intravenous Q24H [facility]    sodium chloride 0 9 % infusion  20 mL/hr Intravenous Continuous    vitamin A capsule 10,000 Units  10,000 Units Oral Daily       No Known Allergies    Objective     Temp:  [98 °F (36 7 °C)-98 2 °F (36 8 °C)] 98 2 °F (36 8 °C)  HR:  [65-86] 70  Resp:  [16-20] 20  BP: (111-123)/(62-74) 113/64    Physical Exam  Vitals and nursing note reviewed  Constitutional:       General: He is awake   He is not in acute distress  Appearance: He is not ill-appearing, toxic-appearing or diaphoretic  HENT:      Head: Normocephalic and atraumatic  Nose: Nose normal    Eyes:      Extraocular Movements: Extraocular movements intact  Pulmonary:      Effort: Pulmonary effort is normal  No tachypnea, bradypnea or respiratory distress  Skin:     Coloration: Skin is pale  Neurological:      Mental Status: He is alert and oriented to person, place, and time  Mental status is at baseline  Psychiatric:         Attention and Perception: Attention normal          Mood and Affect: Mood normal  Mood is not anxious  Speech: Speech normal          Behavior: Behavior normal  Behavior is not agitated  Behavior is cooperative  Lab Results:   Results from last 7 days   Lab Units 07/30/21  0527   WBC Thousand/uL 6 79   HEMOGLOBIN g/dL 8 9*   HEMATOCRIT % 29 1*   PLATELETS Thousands/uL 188      Results from last 7 days   Lab Units 07/30/21  0527 07/28/21  0532 07/27/21  1906   POTASSIUM mmol/L 3 9 3 6  --    CHLORIDE mmol/L 103 104  --    CO2 mmol/L 29 28  --    CO2, I-STAT mmol/L  --   --  22   BUN mg/dL 14 7  --    CREATININE mg/dL 0 37* 0 44*  --    CALCIUM mg/dL 8 7 8 6  --    ALK PHOS U/L  --  54  --    ALT U/L  --  42  --    AST U/L  --  9  --    GLUCOSE, ISTAT mg/dl  --   --  161*         Please note that the APS provides consultative services regarding pain management only  With the exception of ketamine and epidural infusions and except when indicated, final decisions regarding starting or changing doses of analgesic medications are at the discretion of the consulting service  Off hours consultation and/or medication management is generally not available      J CARLOS Loredo  Acute Pain Service

## 2021-07-31 LAB
ANION GAP SERPL CALCULATED.3IONS-SCNC: 5 MMOL/L (ref 4–13)
BUN SERPL-MCNC: 17 MG/DL (ref 5–25)
CALCIUM SERPL-MCNC: 8.9 MG/DL (ref 8.3–10.1)
CHLORIDE SERPL-SCNC: 104 MMOL/L (ref 100–108)
CO2 SERPL-SCNC: 28 MMOL/L (ref 21–32)
CREAT SERPL-MCNC: 0.42 MG/DL (ref 0.6–1.3)
GFR SERPL CREATININE-BSD FRML MDRD: 166 ML/MIN/1.73SQ M
GLUCOSE SERPL-MCNC: 106 MG/DL (ref 65–140)
GLUCOSE SERPL-MCNC: 128 MG/DL (ref 65–140)
GLUCOSE SERPL-MCNC: 131 MG/DL (ref 65–140)
GLUCOSE SERPL-MCNC: 138 MG/DL (ref 65–140)
GLUCOSE SERPL-MCNC: 151 MG/DL (ref 65–140)
MAGNESIUM SERPL-MCNC: 2.2 MG/DL (ref 1.6–2.6)
PHOSPHATE SERPL-MCNC: 4 MG/DL (ref 2.7–4.5)
POTASSIUM SERPL-SCNC: 3.7 MMOL/L (ref 3.5–5.3)
SODIUM SERPL-SCNC: 137 MMOL/L (ref 136–145)

## 2021-07-31 PROCEDURE — C9113 INJ PANTOPRAZOLE SODIUM, VIA: HCPCS | Performed by: PHYSICIAN ASSISTANT

## 2021-07-31 PROCEDURE — 82948 REAGENT STRIP/BLOOD GLUCOSE: CPT

## 2021-07-31 PROCEDURE — 80048 BASIC METABOLIC PNL TOTAL CA: CPT | Performed by: PHYSICIAN ASSISTANT

## 2021-07-31 PROCEDURE — 84100 ASSAY OF PHOSPHORUS: CPT | Performed by: PHYSICIAN ASSISTANT

## 2021-07-31 PROCEDURE — 83735 ASSAY OF MAGNESIUM: CPT | Performed by: PHYSICIAN ASSISTANT

## 2021-07-31 RX ORDER — OXYCODONE HYDROCHLORIDE 5 MG/1
5 TABLET ORAL EVERY 4 HOURS PRN
Status: DISCONTINUED | OUTPATIENT
Start: 2021-07-31 | End: 2021-08-02

## 2021-07-31 RX ORDER — OXYCODONE HYDROCHLORIDE 10 MG/1
10 TABLET ORAL EVERY 4 HOURS PRN
Status: DISCONTINUED | OUTPATIENT
Start: 2021-07-31 | End: 2021-08-02

## 2021-07-31 RX ORDER — PREDNISONE 20 MG/1
60 TABLET ORAL DAILY
Status: DISCONTINUED | OUTPATIENT
Start: 2021-07-31 | End: 2021-08-02 | Stop reason: HOSPADM

## 2021-07-31 RX ADMIN — Medication 10000 UNITS: at 08:01

## 2021-07-31 RX ADMIN — ASCORBIC ACID: 500 INJECTION INTRAVENOUS at 21:42

## 2021-07-31 RX ADMIN — HEPARIN SODIUM 5000 UNITS: 5000 INJECTION INTRAVENOUS; SUBCUTANEOUS at 21:47

## 2021-07-31 RX ADMIN — ACETAMINOPHEN 975 MG: 325 TABLET, FILM COATED ORAL at 06:20

## 2021-07-31 RX ADMIN — PANTOPRAZOLE SODIUM 40 MG: 40 INJECTION, POWDER, FOR SOLUTION INTRAVENOUS at 06:19

## 2021-07-31 RX ADMIN — ACETAMINOPHEN 975 MG: 325 TABLET, FILM COATED ORAL at 21:47

## 2021-07-31 RX ADMIN — PREDNISONE 60 MG: 20 TABLET ORAL at 10:34

## 2021-07-31 RX ADMIN — HEPARIN SODIUM 5000 UNITS: 5000 INJECTION INTRAVENOUS; SUBCUTANEOUS at 13:37

## 2021-07-31 RX ADMIN — METHOCARBAMOL 500 MG: 500 TABLET, FILM COATED ORAL at 06:20

## 2021-07-31 RX ADMIN — METHOCARBAMOL 500 MG: 500 TABLET, FILM COATED ORAL at 21:47

## 2021-07-31 RX ADMIN — METHYLPREDNISOLONE SODIUM SUCCINATE 20 MG: 40 INJECTION, POWDER, FOR SOLUTION INTRAMUSCULAR; INTRAVENOUS at 06:19

## 2021-07-31 RX ADMIN — HEPARIN SODIUM 5000 UNITS: 5000 INJECTION INTRAVENOUS; SUBCUTANEOUS at 06:20

## 2021-07-31 RX ADMIN — METHOCARBAMOL 500 MG: 500 TABLET, FILM COATED ORAL at 13:37

## 2021-07-31 NOTE — PROGRESS NOTES
Progress Note - Colorectal Surgery  [facility] 24 y o  male MRN: [id number]  Unit/Bed#: [place] 816-01 Encounter: [id number]    Assessment:  24 y o  male w/ UC is now 4 Days Post-Op s/p Procedure(s) (LRB):  LAPAROSCOPIC HAND ASSISTED SUBCOLECTOMY; END ILEOSTOMY (N/A)    Plan:  - Adult 3-in-1 TPN (custom base / custom electrolytes)  Diet GI; Lo Fiber/Lo Residue    - Pain and Nausea control PRN  - Will d/c PCA and begin PO pain regimen  - Incentive spirometry  - OOB, ambulate  - Will switch to PO Prednisone taper starting at 60mg daily for 1 week per GI      Subjective/Objective     Subjective: The patients pain is much better controlled  He is tolerating a low residue diet without nausea  He is ambulating  He reports minimal and decreasing bloody stool from his ostomy  Objective:   Vitals: Blood pressure 108/61, pulse 64, temperature (!) 97 4 °F (36 3 °C), resp  rate 18, height 6' 3" (1 905 m), weight 96 4 kg (212 lb 8 4 oz), SpO2 99 %  ,Body mass index is 26 56 kg/m²  I/O       07/29 0701 - 07/30 0700 07/30 0701 - 07/31 0700 07/31 0701 - 08/01 0700    P  O  120 480     I V  (mL/kg) 2 6 (0)      Total Intake(mL/kg) 122 6 (1 3) 480 (5)     Urine (mL/kg/hr) 2100 (0 9) 1400 (0 6)     Drains 10 20     Stool 275 0     Total Output 2385 1420     Net -2262 4 -940                  Physical Exam:  Gen: NAD, Aox3, Comfortable in bed  Chest: Normal work of breathing, no respiratory distress  Abd: Soft, ND, diffuse but moderately tender  Surgical sites are clean, dry, and intact  Ostomy is pink and perfused  Scant blood in ostomy w/ stool and gas  Ext: No Edema  Skin: Warm, Dry, Intact      Lab, Imaging and other studies: I have personally reviewed pertinent reports    , CBC with diff: No results found for: WBC, HGB, HCT, MCV, PLT, ADJUSTEDWBC, MCH, MCHC, RDW, MPV, NRBC, BMP/CMP: No results found for: SODIUM, K, CL, CO2, ANIONGAP, BUN, CREATININE, GLUCOSE, CALCIUM, AST, ALT, ALKPHOS, PROT, BILITOT, EGFR  VTE Pharmacologic Prophylaxis: Heparin  VTE Mechanical Prophylaxis: sequential compression device        Perlita Haq MD  7/31/2021  7:24 AM

## 2021-07-31 NOTE — PLAN OF CARE
Problem: PAIN - ADULT  Goal: Verbalizes/displays adequate comfort level or baseline comfort level  Description: Interventions:  - Encourage patient to monitor pain and request assistance  - Assess pain using appropriate pain scale  - Administer analgesics based on type and severity of pain and evaluate response  - Implement non-pharmacological measures as appropriate and evaluate response  - Consider cultural and social influences on pain and pain management  - Notify physician/advanced practitioner if interventions unsuccessful or patient reports new pain  Outcome: Progressing     Problem: DISCHARGE PLANNING  Goal: Discharge to home or other facility with appropriate resources  Description: INTERVENTIONS:  - Identify barriers to discharge w/patient and caregiver  - Arrange for needed discharge resources and transportation as appropriate  - Identify discharge learning needs (meds, wound care, etc )  - Arrange for interpretive services to assist at discharge as needed  - Refer to Case Management Department for coordinating discharge planning if the patient needs post-hospital services based on physician/advanced practitioner order or complex needs related to functional status, cognitive ability, or social support system  Outcome: Progressing     Problem: Knowledge Deficit  Goal: Patient/family/caregiver demonstrates understanding of disease process, treatment plan, medications, and discharge instructions  Description: Complete learning assessment and assess knowledge base    Interventions:  - Provide teaching at level of understanding  - Provide teaching via preferred learning methods  Outcome: Progressing     Problem: Potential for Falls  Goal: Patient will remain free of falls  Description: INTERVENTIONS:  - Educate patient/family on patient safety including physical limitations  - Instruct patient to call for assistance with activity   - Consult OT/PT to assist with strengthening/mobility   - Keep Call bell within reach  - Keep bed low and locked with side rails adjusted as appropriate  - Keep care items and personal belongings within reach  - Initiate and maintain comfort rounds  - Make Fall Risk Sign visible to staff  - Apply yellow socks and bracelet for high fall risk patients  - Consider moving patient to room near nurses station  Outcome: Progressing     Problem: INFECTION - ADULT  Goal: Absence or prevention of progression during hospitalization  Description: INTERVENTIONS:  - Assess and monitor for signs and symptoms of infection  - Monitor lab/diagnostic results  - Monitor all insertion sites, i e  indwelling lines, tubes, and drains  - Monitor endotracheal if appropriate and nasal secretions for changes in amount and color  - Crawford appropriate cooling/warming therapies per order  - Administer medications as ordered  - Instruct and encourage patient and family to use good hand hygiene technique  - Identify and instruct in appropriate isolation precautions for identified infection/condition  Outcome: Progressing     Problem: GASTROINTESTINAL - ADULT  Goal: Maintains or returns to baseline bowel function  Description: INTERVENTIONS:  - Assess bowel function  - Encourage oral fluids to ensure adequate hydration  - Administer IV fluids if ordered to ensure adequate hydration  - Administer ordered medications as needed  - Encourage mobilization and activity  - Consider nutritional services referral to assist patient with adequate nutrition and appropriate food choices  Outcome: Progressing  Goal: Maintains adequate nutritional intake  Description: INTERVENTIONS:  - Monitor percentage of each meal consumed  - Identify factors contributing to decreased intake, treat as appropriate  - Assist with meals as needed  - Monitor I&O, weight, and lab values if indicated  - Obtain nutrition services referral as needed  Outcome: Progressing     Problem: METABOLIC, FLUID AND ELECTROLYTES - ADULT  Goal: Electrolytes maintained within normal limits  Description: INTERVENTIONS:  - Monitor labs and assess patient for signs and symptoms of electrolyte imbalances  - Administer electrolyte replacement as ordered  - Monitor response to electrolyte replacements, including repeat lab results as appropriate  - Instruct patient on fluid and nutrition as appropriate  Outcome: Progressing     Problem: HEMATOLOGIC - ADULT  Goal: Maintains hematologic stability  Description: INTERVENTIONS  - Assess for signs and symptoms of bleeding or hemorrhage  - Monitor labs  - Administer supportive blood products/factors as ordered and appropriate  Outcome: Progressing     Problem: Nutrition/Hydration-ADULT  Goal: Nutrient/Hydration intake appropriate for improving, restoring or maintaining nutritional needs  Description: Monitor and assess patient's nutrition/hydration status for malnutrition  Collaborate with interdisciplinary team and initiate plan and interventions as ordered  Monitor patient's weight and dietary intake as ordered or per policy  Utilize nutrition screening tool and intervene as necessary  Determine patient's food preferences and provide high-protein, high-caloric foods as appropriate       INTERVENTIONS:  - Monitor oral intake, urinary output, labs, and treatment plans  - Assess nutrition and hydration status and recommend course of action  - Evaluate amount of meals eaten  - Assist patient with eating if necessary   - Allow adequate time for meals  - Recommend/ encourage appropriate diets, oral nutritional supplements, and vitamin/mineral supplements  - Order, calculate, and assess calorie counts as needed  - Recommend, monitor, and adjust tube feedings and TPN/PPN based on assessed needs  - Assess need for intravenous fluids  - Provide specific nutrition/hydration education as appropriate  - Include patient/family/caregiver in decisions related to nutrition  Outcome: Progressing

## 2021-08-01 LAB
ANION GAP SERPL CALCULATED.3IONS-SCNC: 6 MMOL/L (ref 4–13)
ANISOCYTOSIS BLD QL SMEAR: PRESENT
BASOPHILS # BLD MANUAL: 0 THOUSAND/UL (ref 0–0.1)
BASOPHILS NFR MAR MANUAL: 0 % (ref 0–1)
BUN SERPL-MCNC: 17 MG/DL (ref 5–25)
CALCIUM SERPL-MCNC: 9.3 MG/DL (ref 8.3–10.1)
CHLORIDE SERPL-SCNC: 104 MMOL/L (ref 100–108)
CO2 SERPL-SCNC: 28 MMOL/L (ref 21–32)
CREAT SERPL-MCNC: 0.51 MG/DL (ref 0.6–1.3)
EOSINOPHIL # BLD MANUAL: 0 THOUSAND/UL (ref 0–0.4)
EOSINOPHIL NFR BLD MANUAL: 0 % (ref 0–6)
ERYTHROCYTE [DISTWIDTH] IN BLOOD BY AUTOMATED COUNT: 18.5 % (ref 11.6–15.1)
GFR SERPL CREATININE-BSD FRML MDRD: 154 ML/MIN/1.73SQ M
GLUCOSE SERPL-MCNC: 104 MG/DL (ref 65–140)
GLUCOSE SERPL-MCNC: 125 MG/DL (ref 65–140)
GLUCOSE SERPL-MCNC: 128 MG/DL (ref 65–140)
GLUCOSE SERPL-MCNC: 133 MG/DL (ref 65–140)
GLUCOSE SERPL-MCNC: 82 MG/DL (ref 65–140)
HCT VFR BLD AUTO: 31.4 % (ref 36.5–49.3)
HGB BLD-MCNC: 9.7 G/DL (ref 12–17)
LYMPHOCYTES # BLD AUTO: 1.96 THOUSAND/UL (ref 0.6–4.47)
LYMPHOCYTES # BLD AUTO: 25 % (ref 14–44)
MCH RBC QN AUTO: 28.6 PG (ref 26.8–34.3)
MCHC RBC AUTO-ENTMCNC: 30.9 G/DL (ref 31.4–37.4)
MCV RBC AUTO: 93 FL (ref 82–98)
MICROCYTES BLD QL AUTO: PRESENT
MONOCYTES # BLD AUTO: 0.39 THOUSAND/UL (ref 0–1.22)
MONOCYTES NFR BLD: 5 % (ref 4–12)
MYELOCYTES NFR BLD MANUAL: 1 % (ref 0–1)
NEUTROPHILS # BLD MANUAL: 5.41 THOUSAND/UL (ref 1.85–7.62)
NEUTS BAND NFR BLD MANUAL: 2 % (ref 0–8)
NEUTS SEG NFR BLD AUTO: 67 % (ref 43–75)
NRBC BLD AUTO-RTO: 0 /100 WBCS
PLATELET # BLD AUTO: 174 THOUSANDS/UL (ref 149–390)
PLATELET BLD QL SMEAR: ADEQUATE
PMV BLD AUTO: 11.7 FL (ref 8.9–12.7)
POLYCHROMASIA BLD QL SMEAR: PRESENT
POTASSIUM SERPL-SCNC: 3.7 MMOL/L (ref 3.5–5.3)
RBC # BLD AUTO: 3.39 MILLION/UL (ref 3.88–5.62)
RBC MORPH BLD: PRESENT
SODIUM SERPL-SCNC: 138 MMOL/L (ref 136–145)
WBC # BLD AUTO: 7.84 THOUSAND/UL (ref 4.31–10.16)

## 2021-08-01 PROCEDURE — C9113 INJ PANTOPRAZOLE SODIUM, VIA: HCPCS | Performed by: PHYSICIAN ASSISTANT

## 2021-08-01 PROCEDURE — 82948 REAGENT STRIP/BLOOD GLUCOSE: CPT

## 2021-08-01 PROCEDURE — 85027 COMPLETE CBC AUTOMATED: CPT | Performed by: STUDENT IN AN ORGANIZED HEALTH CARE EDUCATION/TRAINING PROGRAM

## 2021-08-01 PROCEDURE — 85007 BL SMEAR W/DIFF WBC COUNT: CPT | Performed by: STUDENT IN AN ORGANIZED HEALTH CARE EDUCATION/TRAINING PROGRAM

## 2021-08-01 PROCEDURE — 80048 BASIC METABOLIC PNL TOTAL CA: CPT | Performed by: STUDENT IN AN ORGANIZED HEALTH CARE EDUCATION/TRAINING PROGRAM

## 2021-08-01 RX ORDER — POTASSIUM CHLORIDE 20 MEQ/1
40 TABLET, EXTENDED RELEASE ORAL ONCE
Status: COMPLETED | OUTPATIENT
Start: 2021-08-01 | End: 2021-08-01

## 2021-08-01 RX ADMIN — HEPARIN SODIUM 5000 UNITS: 5000 INJECTION INTRAVENOUS; SUBCUTANEOUS at 06:18

## 2021-08-01 RX ADMIN — PANTOPRAZOLE SODIUM 40 MG: 40 INJECTION, POWDER, FOR SOLUTION INTRAVENOUS at 06:18

## 2021-08-01 RX ADMIN — METHOCARBAMOL 500 MG: 500 TABLET, FILM COATED ORAL at 06:18

## 2021-08-01 RX ADMIN — ACETAMINOPHEN 975 MG: 325 TABLET, FILM COATED ORAL at 22:26

## 2021-08-01 RX ADMIN — ACETAMINOPHEN 975 MG: 325 TABLET, FILM COATED ORAL at 06:18

## 2021-08-01 RX ADMIN — METHOCARBAMOL 500 MG: 500 TABLET, FILM COATED ORAL at 14:03

## 2021-08-01 RX ADMIN — HEPARIN SODIUM 5000 UNITS: 5000 INJECTION INTRAVENOUS; SUBCUTANEOUS at 14:04

## 2021-08-01 RX ADMIN — PREDNISONE 60 MG: 20 TABLET ORAL at 08:18

## 2021-08-01 RX ADMIN — POTASSIUM CHLORIDE 40 MEQ: 1500 TABLET, EXTENDED RELEASE ORAL at 08:18

## 2021-08-01 RX ADMIN — Medication 10000 UNITS: at 08:18

## 2021-08-01 RX ADMIN — METHOCARBAMOL 500 MG: 500 TABLET, FILM COATED ORAL at 22:26

## 2021-08-01 RX ADMIN — ACETAMINOPHEN 975 MG: 325 TABLET, FILM COATED ORAL at 14:03

## 2021-08-01 NOTE — PROGRESS NOTES
Progress Note - Colorectal Surgery  Natalie Hernandez 24 y o  male MRN: [id number]  Unit/Bed#: [facility] 816-01 Encounter: [id number]    Assessment:  24 y o  male w/ UC is now 5 Days Post-Op s/p Procedure(s) (LRB):  LAPAROSCOPIC HAND ASSISTED SUBCOLECTOMY; END ILEOSTOMY (N/A)    Plan:  - Diet GI; Lo Fiber/Lo Residue  Adult 3-in-1 TPN (custom base / custom electrolytes)  Will allow TPN to  today  - Pain and Nausea control PRN  - Incentive spirometry  - OOB, ambulate  - Continue PO steroid taper per GI recs    Subjective/Objective     Subjective: The patients pain is well controlled  He denies the presence of any further blood in his ostomy bag  He is eating more and feels stronger than the previous days  MEGHANA was removed yesterday         Objective:   Vitals: Blood pressure 107/63, pulse 64, temperature 97 6 °F (36 4 °C), resp  rate 20, height 6' 3" (1 905 m), weight 96 4 kg (212 lb 8 4 oz), SpO2 97 %  ,Body mass index is 26 56 kg/m²  I/O       701 -  0700 701 -  07 07 -  0700    P  O  480      I V  (mL/kg) 1 2 (0)      Total Intake(mL/kg) 481 2 (5)      Urine (mL/kg/hr) 1400 (0 6) 700 (0 3)     Drains 20 5     Stool 0 350     Total Output 1420 1055     Net -938 8 -1055            Unmeasured Urine Occurrence  1 x           Physical Exam:  Gen: NAD, Aox3, Comfortable in bed  Chest: Normal work of breathing, no respiratory distress  Abd: Soft, ND, minimally tender  Surgical sites are clean, dry, and intact   Ext: No Edema  Skin: Warm, Dry, Intact      Lab, Imaging and other studies:   I have personally reviewed pertinent reports    , CBC with diff:   Lab Results   Component Value Date    WBC 7 84 2021    HGB 9 7 (L) 2021    HCT 31 4 (L) 2021    MCV 93 2021     2021    MCH 28 6 2021    MCHC 30 9 (L) 2021    RDW 18 5 (H) 2021    MPV 11 7 2021    NRBC 0 2021   , BMP/CMP:   Lab Results   Component Value Date    SODIUM 138 08/01/2021    K 3 7 08/01/2021     08/01/2021    CO2 28 08/01/2021    BUN 17 08/01/2021    CREATININE 0 51 (L) 08/01/2021    CALCIUM 9 3 08/01/2021    EGFR 154 08/01/2021     VTE Pharmacologic Prophylaxis: Heparin  VTE Mechanical Prophylaxis: sequential compression device        Amy Sanon MD  8/1/2021  8:40 AM

## 2021-08-02 VITALS
DIASTOLIC BLOOD PRESSURE: 72 MMHG | HEART RATE: 75 BPM | SYSTOLIC BLOOD PRESSURE: 116 MMHG | OXYGEN SATURATION: 98 % | BODY MASS INDEX: 26.42 KG/M2 | RESPIRATION RATE: 16 BRPM | TEMPERATURE: 97.5 F | WEIGHT: 212.52 LBS | HEIGHT: 75 IN

## 2021-08-02 LAB — GLUCOSE SERPL-MCNC: 86 MG/DL (ref 65–140)

## 2021-08-02 PROCEDURE — C9113 INJ PANTOPRAZOLE SODIUM, VIA: HCPCS | Performed by: PHYSICIAN ASSISTANT

## 2021-08-02 PROCEDURE — 82948 REAGENT STRIP/BLOOD GLUCOSE: CPT

## 2021-08-02 PROCEDURE — 99232 SBSQ HOSP IP/OBS MODERATE 35: CPT | Performed by: NURSE PRACTITIONER

## 2021-08-02 RX ORDER — PREDNISONE 20 MG/1
60 TABLET ORAL DAILY
Qty: 15 TABLET | Refills: 0 | Status: SHIPPED | OUTPATIENT
Start: 2021-08-02 | End: 2021-08-07

## 2021-08-02 RX ORDER — PREDNISONE 20 MG/1
40 TABLET ORAL DAILY
Qty: 14 TABLET | Refills: 0 | Status: SHIPPED | OUTPATIENT
Start: 2021-08-16 | End: 2021-08-23

## 2021-08-02 RX ORDER — PREDNISONE 20 MG/1
30 TABLET ORAL DAILY
Qty: 11 TABLET | Refills: 0 | Status: SHIPPED | OUTPATIENT
Start: 2021-08-24 | End: 2021-08-31

## 2021-08-02 RX ORDER — METHOCARBAMOL 500 MG/1
500 TABLET, FILM COATED ORAL EVERY 8 HOURS SCHEDULED
Qty: 15 TABLET | Refills: 0 | Status: SHIPPED | OUTPATIENT
Start: 2021-08-02 | End: 2021-08-07

## 2021-08-02 RX ORDER — PREDNISONE 20 MG/1
50 TABLET ORAL DAILY
Qty: 18 TABLET | Refills: 0 | Status: SHIPPED | OUTPATIENT
Start: 2021-08-08 | End: 2021-08-15

## 2021-08-02 RX ORDER — ACETAMINOPHEN 325 MG/1
975 TABLET ORAL EVERY 8 HOURS SCHEDULED
Qty: 45 TABLET | Refills: 0 | Status: SHIPPED | OUTPATIENT
Start: 2021-08-02 | End: 2021-08-07

## 2021-08-02 RX ORDER — PREDNISONE 10 MG/1
10 TABLET ORAL DAILY
Qty: 7 TABLET | Refills: 0 | Status: SHIPPED | OUTPATIENT
Start: 2021-09-09 | End: 2021-09-16

## 2021-08-02 RX ORDER — PREDNISONE 20 MG/1
20 TABLET ORAL DAILY
Qty: 7 TABLET | Refills: 0 | Status: SHIPPED | OUTPATIENT
Start: 2021-09-01 | End: 2021-09-08

## 2021-08-02 RX ADMIN — METHOCARBAMOL 500 MG: 500 TABLET, FILM COATED ORAL at 06:31

## 2021-08-02 RX ADMIN — METHOCARBAMOL 500 MG: 500 TABLET, FILM COATED ORAL at 13:25

## 2021-08-02 RX ADMIN — HEPARIN SODIUM 5000 UNITS: 5000 INJECTION INTRAVENOUS; SUBCUTANEOUS at 06:31

## 2021-08-02 RX ADMIN — HEPARIN SODIUM 5000 UNITS: 5000 INJECTION INTRAVENOUS; SUBCUTANEOUS at 13:26

## 2021-08-02 RX ADMIN — PREDNISONE 60 MG: 20 TABLET ORAL at 08:33

## 2021-08-02 RX ADMIN — Medication 10000 UNITS: at 08:32

## 2021-08-02 RX ADMIN — ACETAMINOPHEN 975 MG: 325 TABLET, FILM COATED ORAL at 13:25

## 2021-08-02 RX ADMIN — PANTOPRAZOLE SODIUM 40 MG: 40 INJECTION, POWDER, FOR SOLUTION INTRAVENOUS at 06:31

## 2021-08-02 RX ADMIN — ACETAMINOPHEN 975 MG: 325 TABLET, FILM COATED ORAL at 06:31

## 2021-08-02 NOTE — UTILIZATION REVIEW
Continued Stay Review    Date: 8/2/21                          Current Patient Class: inpatient  Current Level of Care: med surg    HPI:21 y o  male initially admitted on 7/15 with ulcerative colitis with ongoing BRBPR  Syncope at lab with blood draw  Now POD 6 from laparoscopic subtotal colectomy with end ileostomy 7/27/21    Assessment/Plan: Pt is lying in bed and appears comfortable  Patient denies pain, pt denies n/v, pt denies CP/SOB  Pt reports he is tolerating diet well, out of bed to the bathroom  Plan c/w diet GI, lo fiber/lo residue, pain and nausea control, encourage IS, OOB as sushma, c/w PO steroid taper per GI, protonix, SQH     8/2/21 Per Acute Pain Service:  Tylenol q8h, Robaxin q8h, dc IV Dilaudid and dc Oxycodone  Pain is well controlled on the above medication regimen  Has not required opioids in over 24 hours for pain control  Would recommend Tylenol and Robaxin at time of discharge;no opioids  Okay for discharge from a pain management standpoint  Treatment recommendations and discharge plan of care discussed with primary care service  APS will sign off at this time  Thank you for the consult       Vital Signs:   Date/Time  Temp  Pulse  Resp  BP  MAP (mmHg)  SpO2  O2 Device   08/02/21 08:17:41  97 5 °F (36 4 °C)  75  16  116/72  87  98 %  None (Room air)   08/01/21 22:31:01  97 5 °F (36 4 °C)  77  16  117/72  87  97 %  --   08/01/21 16:49:47  98 3 °F (36 8 °C)  91  22  116/73  87  97 %  --   08/01/21 07:25:54  97 6 °F (36 4 °C)  64  20  107/63  78  97 %  --   07/31/21 16:05:41  98 2 °F (36 8 °C)  89  18  114/67  83  97 %  --   07/31/21 08:00:13  97 6 °F (36 4 °C)  64  --  110/63  79  98 %  --   07/31/21 07:33:33  97 4 °F (36 3 °C)Abnormal   60  20  109/64  79  98 %  --       Pertinent Labs/Diagnostic Results:       Results from last 7 days   Lab Units 08/01/21  0626 07/30/21  0527 07/29/21  0508 07/28/21  0532 07/27/21  2058 07/27/21  0618   WBC Thousand/uL 7 84 6 79 9 73 8 28 6 28 8 61 HEMOGLOBIN g/dL 9 7* 8 9* 9 7* 8 8* 7 0* 7 9*   HEMATOCRIT % 31 4* 29 1* 31 7* 27 7* 22 6* 26 0*   PLATELETS Thousands/uL 174 188 191 194 181 222   NEUTROS ABS Thousands/µL  --   --   --   --   --  4 07   BANDS PCT % 2  --   --  3 24*  --      Results from last 7 days   Lab Units 08/01/21  0626 07/31/21  0619 07/30/21  0527 07/29/21  0508 07/28/21  0532 07/27/21  1906 07/27/21  1729   SODIUM mmol/L 138 137 137 135* 136  --   --    POTASSIUM mmol/L 3 7 3 7 3 9 3 7 3 6  --   --    CHLORIDE mmol/L 104 104 103 103 104  --   --    CO2 mmol/L 28 28 29 30 28  --   --    CO2, I-STAT mmol/L  --   --   --   --   --  22 26   ANION GAP mmol/L 6 5 5 2* 4  --   --    BUN mg/dL 17 17 14 10 7  --   --    CREATININE mg/dL 0 51* 0 42* 0 37* 0 40* 0 44*  --   --    EGFR ml/min/1 73sq m 154 166 175 170 163  --   --    CALCIUM mg/dL 9 3 8 9 8 7 8 9 8 6  --   --    CALCIUM, IONIZED, ISTAT mmol/L  --   --   --   --   --  1 15 1 23   MAGNESIUM mg/dL  --  2 2  --   --  2 0  --   --    PHOSPHORUS mg/dL  --  4 0  --   --  3 2  --   --      Results from last 7 days   Lab Units 07/28/21  0532   AST U/L 9   ALT U/L 42   ALK PHOS U/L 54   TOTAL PROTEIN g/dL 5 6*   ALBUMIN g/dL 2 3*   TOTAL BILIRUBIN mg/dL 0 57     Results from last 7 days   Lab Units 08/02/21  0809 08/01/21  2105 08/01/21  1639 08/01/21  1225 08/01/21  0742 07/31/21  2051 07/31/21  1603 07/31/21  1153 07/31/21  0802 07/30/21  2141 07/30/21  1646 07/30/21  1104   POC GLUCOSE mg/dl 86 128 125 133 104 138 128 151* 131 72 121 133     Results from last 7 days   Lab Units 08/01/21  0626 07/31/21  0619 07/30/21  0527 07/29/21  0508 07/28/21  0532 07/27/21  0618   GLUCOSE RANDOM mg/dL 82 106 123 98 123 109     Results from last 7 days   Lab Units 07/27/21  1906 07/27/21  1729   I STAT BASE EXC mmol/L -3* -1   I STAT O2 SAT % 100* 100*   ISTAT PH ART  7 415 7 354   I STAT ART PCO2 mm HG 33 5* 44 1*   I STAT ART PO2 mm  0* 196 0*   I STAT ART HCO3 mmol/L 21 5* 24 6     Results from last 7 days   Lab Units 07/27/21  0618   PROTIME seconds 14 9*   INR  1 17     Results from last 7 days   Lab Units 07/29/21  0508 07/28/21  0532 07/27/21  0618 07/26/21  1628   CRP mg/L 27 4* 20 3* 19 6* 18 5*     Results from last 7 days   Lab Units 07/27/21  2058   TOTAL COUNTED  100       Medications:   Scheduled Medications:  acetaminophen, 975 mg, Oral, Q8H CALE  heparin (porcine), 5,000 Units, Subcutaneous, Q8H CALE  methocarbamol, 500 mg, Oral, Q8H CALE  pantoprazole, 40 mg, Intravenous, Q24H CALE  predniSONE, 60 mg, Oral, Daily  vitamin A, 10,000 Units, Oral, Daily      Continuous IV Infusions: none     PRN Meds:  ondansetron, 4 mg, Intravenous, Q6H PRN        Discharge Plan: D    Network Utilization Review Department  ATTENTION: Please call with any questions or concerns to [phone] and carefully listen to the prompts so that you are directed to the right person  All voicemails are confidential   Marjose de jesus Shaper all requests for admission clinical reviews, approved or denied determinations and any other requests to dedicated fax number below belonging to the campus where the patient is receiving treatment   List of dedicated fax numbers for the Facilities:  [address] DENIALS (Administrative/Medical Necessity) [phone]   [address] (Maternity/NICU/Pediatrics) [phone]   [address] [place] [address] [address] [place] [address] [address] [facility] [phone]

## 2021-08-02 NOTE — PROGRESS NOTES
Progress Note - Acute Pain Service    Dashawn Warren 24 y o  male MRN: [id number]  Unit/Bed#: [facility] 816-01 Encounter: [id number]      Assessment:   Principal Problem:    Colitis, ulcerative ([facility] )  Active Problems:    Iron deficiency anemia    Transaminitis    ABLA (acute blood loss anemia)    Severe protein-calorie malnutrition ([facility] [address] )    Dashawn Warren is a 24 y o  male with a history of ulcerative colitis   S/P  Laparoscopic hand assisted subtotal colectomy and end ileostomy on 7/27/2021  Bilateral tap blocks with Exparel were done preoperatively for postop pain control; no sensory deficits noted on exam   Epidural was not placed as patient was on Lovenox prior to surgery  Plan:   · Tylenol 975 mg every 8 hours scheduled   · Robaxin 500 mg every 8 hours scheduled   · Discontinue IV Dilaudid   · Discontinue oxycodone    - Bowel regimen when appropriate from surgical perspective       Pain is well controlled on the above medication regimen  Has not required opioids in over 24 hours for pain control  Would recommend Tylenol and Robaxin at time of discharge;no opioids  Okay for discharge from a pain management standpoint  Treatment recommendations and discharge plan of care discussed with primary care service  APS will sign off at this time  Thank you for the consult  All opioids and other analgesics to be written at discretion of primary team  Please contact Acute Pain Service - [facility] via [facility] from [phone] with additional questions or concerns  See Jo Ann or Vickey for additional contacts and after hours information  Pain History  Current pain location(s): abdomen  Pain Scale:   0-2  24 hour history:   Patient resting in bed, tolerating diet, no nausea or vomiting  Reporting minimal abdominal pain which is well controlled on the above medication regimen without opioid pain medications      Opioid requirement previous 24 hours: None    Meds/Allergies   all current active meds have been reviewed and current meds:   Current Facility-Administered Medications   Medication Dose Route Frequency    acetaminophen (TYLENOL) tablet 975 mg  975 mg Oral Q8H [address]    heparin (porcine) subcutaneous injection 5,000 Units  5,000 Units Subcutaneous Q8H [address]    HYDROmorphone (DILAUDID) injection 0 5 mg  0 5 mg Intravenous Q2H PRN    methocarbamol (ROBAXIN) tablet 500 mg  500 mg Oral Q8H [address]    ondansetron (ZOFRAN) injection 4 mg  4 mg Intravenous Q6H PRN    oxyCODONE (ROXICODONE) immediate release tablet 10 mg  10 mg Oral Q4H PRN    oxyCODONE (ROXICODONE) IR tablet 5 mg  5 mg Oral Q4H PRN    pantoprazole (PROTONIX) injection 40 mg  40 mg Intravenous Q24H CALE    predniSONE tablet 60 mg  60 mg Oral Daily    vitamin A capsule 10,000 Units  10,000 Units Oral Daily       No Known Allergies    Objective     Temp:  [97 5 °F (36 4 °C)-98 3 °F (36 8 °C)] 97 5 °F (36 4 °C)  HR:  [75-91] 75  Resp:  [16-22] 16  BP: (116-117)/(72-73) 116/72    Physical Exam  Vitals reviewed  Constitutional:       General: He is awake  He is not in acute distress  Appearance: He is not ill-appearing, toxic-appearing or diaphoretic  HENT:      Head: Normocephalic and atraumatic  Nose: Nose normal    Eyes:      Extraocular Movements: Extraocular movements intact  Pulmonary:      Effort: Pulmonary effort is normal  No tachypnea, bradypnea or respiratory distress  Musculoskeletal:      Cervical back: Normal range of motion  Skin:     Coloration: Skin is pale  Neurological:      Mental Status: He is alert and oriented to person, place, and time  Mental status is at baseline  Psychiatric:         Attention and Perception: Attention normal          Mood and Affect: Mood normal  Mood is not anxious  Speech: Speech normal          Behavior: Behavior normal  Behavior is cooperative           Lab Results:   Results from last 7 days   Lab Units 08/01/21  0626   WBC Thousand/uL 7 84   HEMOGLOBIN g/dL 9 7*   HEMATOCRIT % 31 4* PLATELETS Thousands/uL 174      Results from last 7 days   Lab Units 08/01/21  0626 07/28/21  0532 07/27/21  1906   POTASSIUM mmol/L 3 7 3 6  --    CHLORIDE mmol/L 104 104  --    CO2 mmol/L 28 28  --    CO2, I-STAT mmol/L  --   --  22   BUN mg/dL 17 7  --    CREATININE mg/dL 0 51* 0 44*  --    CALCIUM mg/dL 9 3 8 6  --    ALK PHOS U/L  --  54  --    ALT U/L  --  42  --    AST U/L  --  9  --    GLUCOSE, ISTAT mg/dl  --   --  161*         Please note that the APS provides consultative services regarding pain management only  With the exception of ketamine and epidural infusions and except when indicated, final decisions regarding starting or changing doses of analgesic medications are at the discretion of the consulting service  Off hours consultation and/or medication management is generally not available      J CARLOS Blair  Acute Pain Service

## 2021-08-02 NOTE — DISCHARGE SUMMARY
Discharge Summary - Jelena Lenz 24 y o  male MRN: [id number]    Unit/Bed#: [facility] 816-01 Encounter: [id number]    Admission Date:   Admission Orders (From admission, onward)     Ordered        07/15/21 2203  Inpatient Admission  Once                     Admitting Diagnosis: Ulcerative colitis ([facility] [address] ) [K51 [id number]]    HPI: 20yoM w limited PMH who presents to [facility] on 6/23 for significant rectal bleeding, GI was c/s, and a colonoscopy was performed on 6/25 with findings concerning for ulcerative colitis  Pt was initially treated with steroids and mesalamine, improved, discharged on 6/30 but re-admitted on 7/2 following syncopal episode and multiple bloody BM  Pt was then started on remicade on 7/6, but did not experience significant improvement and continued to have bloody BM while at [facility] (s/p 3u pRBC)  He was transferred to [facility] for further workup of UC and eval by GI and colorectal surgery  Procedures Performed:   7/14 MRU abdomen w and w/o contrast and MRCP   7/20 flex sigmoidoscopy w biopsy  7/27 laparoscopic subtotal colectomy with end ileostomy    Summary of Hospital Course: At [facility] pt was initially managed medically with steroids, IVF, remicade, PPI, with no signs of bleeding or required transfusion, but due to little improvement in symptoms and continued bloody BM, pt was taken for flex sig on 7/20 showing severe/worsending colitis and bx was taken to rule out CMV, CT a/p also shows worsening colitis  Pt was ultimately taken to the OR on 7/27 for laparoscopic subtotal colectomy with end ileostomy given poor response to medical management, continued bloody BM, and Hb drops  Pt tolerated the procedure well and tolerated advances in diet, was OOB, pain and nausea well controlled, was passing gas and his ostomy was functional, able to be cleared from a surgical perspective on POD 6  Pt will be sent home on a steroid taper and has been accepted with personal home health care and hospice       Significant Findings, Care, Treatment and Services Provided:   7/14 MRI abdomen and MRCP shows  - Normal MRCP    - Diffusely decreased T1 and T2 signal consistent with heavy metal deposition such as hemochromatosis  - Thickening of the descending colon consistent with ulcerative colitis   No bowel obstruction    7/20 flex sig bx shows   A  Sigmoid colon,  biopsy:  - Moderate to severe active chronic colitis  - Negative for dysplasia  - Immunohistochemistry for cytomegalovirus is negative      B  Rectum, biopsy:  - Moderate to severe active chronic colitis  - Negative for dysplasia  - Immunohistochemistry for cytomegalovirus is negative    7/27 surgical pathology shows   A  Terminal ileum, colon, and appendix, subtotal colectomy:  - Severe active chronic colitis, including ulcer and epithelial changes of the type seen in long-standing inflammatory bowel disease, involving appendix and distal margin  - Terminal ileum with focal active cryptitis involving proximal margin   - Negative for dysplasia  - Fifteen benign lymph nodes (3/68)    Complications: none    Discharge Diagnosis: Ulcerative colitis    Medical Problems     Resolved Problems  Date Reviewed: 7/27/2021    None                Condition at Discharge: good         Discharge instructions/Information to patient and family:   See after visit summary for information provided to patient and family  Provisions for Follow-Up Care:  See after visit summary for information related to follow-up care and any pertinent home health orders  PCP: Lady Myers DO    Disposition: Home    Planned Readmission: NO      Discharge Statement   I spent 30 minutes discharging the patient  This time was spent on the day of discharge  I had direct contact with the patient on the day of discharge  Additional documentation is required if more than 30 minutes were spent on discharge       Discharge Medications:  See after visit summary for reconciled discharge medications provided to patient and family

## 2021-08-02 NOTE — DISCHARGE INSTR - AVS FIRST PAGE
DISCHARGE INSTRUCTIONS    Follow Up: Follow up with Dr Salbador Velazquez in 3-4 weeks   -Follow up with Dr Los Polanco on 8/9/2021 at [facility]   -Take Prednisone 60mg daily until 8/7, then take 50mg daily from 8/8-8/15, then take 40mg daily from 8/16-8/23, then take 30mg daily from 8/24-8/31, then take 20mg daily 9/1-9/8, then take 10mg daily 9//9-9/16 per GI instructions  Diet: Low Residue/Low Fiber diet for 5 more days then may resume regular diet  Pain: Tylenol and Robaxin for pain control as needed  Shower: You may shower over the wound  Do not bathe or use a pool or hot tub until cleared by the physician  Activity: As tolerated  You may go up and down stairs, walk as much as you are comfortable, but walk at least 3 times each day  Do not lift anything heavier than 15 pounds for at least 2-4 weeks, unless cleared by the doctor  Driving:  Generally, you may drive without need for pain control  Medications: Resume all of your previous medications, unless told otherwise by the doctor  You do not need to take the narcotic pain medications unless you are having significant pain and discomfort  Call the office: If you are experiencing any of the following, fevers above 101 5° or chills, significant nausea or vomiting, increase in abdominal pain, if the wound develops drainage and/or is excessive redness around the wound, or if you have significant diarrhea or other worsening symptoms

## 2021-08-02 NOTE — DISCHARGE INSTRUCTIONS
DISCHARGE INSTRUCTIONS    Follow Up: Follow up with Dr Meena Mendez in 3-4 weeks   -Follow up with Dr Wendy Pedro on 8/9/2021 at [facility]   -Take Prednisone 60mg daily until 8/7, then take 50mg daily from 8/8-8/15, then take 40mg daily from 8/16-8/23, then take 30mg daily from 8/24-8/31, then take 20mg daily 9/1-9/8, then take 10mg daily 9//9-9/16 per GI instructions  Diet: Low Residue/Low Fiber diet for 5 more days then may resume regular diet  Pain: Tylenol and Robaxin for pain control as needed  Shower: You may shower over the wound  Do not bathe or use a pool or hot tub until cleared by the physician  Activity: As tolerated  You may go up and down stairs, walk as much as you are comfortable, but walk at least 3 times each day  Do not lift anything heavier than 15 pounds for at least 2-4 weeks, unless cleared by the doctor  Driving:  Generally, you may drive without need for pain control  Medications: Resume all of your previous medications, unless told otherwise by the doctor  You do not need to take the narcotic pain medications unless you are having significant pain and discomfort  Call the office: If you are experiencing any of the following, fevers above 101 5° or chills, significant nausea or vomiting, increase in abdominal pain, if the wound develops drainage and/or is excessive redness around the wound, or if you have significant diarrhea or other worsening symptoms

## 2021-08-02 NOTE — PLAN OF CARE
Problem: PAIN - ADULT  Goal: Verbalizes/displays adequate comfort level or baseline comfort level  Description: Interventions:  - Encourage patient to monitor pain and request assistance  - Assess pain using appropriate pain scale  - Administer analgesics based on type and severity of pain and evaluate response  - Implement non-pharmacological measures as appropriate and evaluate response  - Consider cultural and social influences on pain and pain management  - Notify physician/advanced practitioner if interventions unsuccessful or patient reports new pain  Outcome: Progressing     Problem: DISCHARGE PLANNING  Goal: Discharge to home or other facility with appropriate resources  Description: INTERVENTIONS:  - Identify barriers to discharge w/patient and caregiver  - Arrange for needed discharge resources and transportation as appropriate  - Identify discharge learning needs (meds, wound care, etc )  - Arrange for interpretive services to assist at discharge as needed  - Refer to Case Management Department for coordinating discharge planning if the patient needs post-hospital services based on physician/advanced practitioner order or complex needs related to functional status, cognitive ability, or social support system  Outcome: Progressing     Problem: Knowledge Deficit  Goal: Patient/family/caregiver demonstrates understanding of disease process, treatment plan, medications, and discharge instructions  Description: Complete learning assessment and assess knowledge base    Interventions:  - Provide teaching at level of understanding  - Provide teaching via preferred learning methods  Outcome: Progressing     Problem: Potential for Falls  Goal: Patient will remain free of falls  Description: INTERVENTIONS:  - Educate patient/family on patient safety including physical limitations  - Instruct patient to call for assistance with activity   - Consult OT/PT to assist with strengthening/mobility   - Keep Call bell within reach  - Keep bed low and locked with side rails adjusted as appropriate  - Keep care items and personal belongings within reach  - Initiate and maintain comfort rounds  - Make Fall Risk Sign visible to staff  - Apply yellow socks and bracelet for high fall risk patients  - Consider moving patient to room near nurses station  Outcome: Progressing     Problem: INFECTION - ADULT  Goal: Absence or prevention of progression during hospitalization  Description: INTERVENTIONS:  - Assess and monitor for signs and symptoms of infection  - Monitor lab/diagnostic results  - Monitor all insertion sites, i e  indwelling lines, tubes, and drains  - Monitor endotracheal if appropriate and nasal secretions for changes in amount and color  - [place] appropriate cooling/warming therapies per order  - Administer medications as ordered  - Instruct and encourage patient and family to use good hand hygiene technique  - Identify and instruct in appropriate isolation precautions for identified infection/condition  Outcome: Progressing     Problem: METABOLIC, FLUID AND ELECTROLYTES - ADULT  Goal: Electrolytes maintained within normal limits  Description: INTERVENTIONS:  - Monitor labs and assess patient for signs and symptoms of electrolyte imbalances  - Administer electrolyte replacement as ordered  - Monitor response to electrolyte replacements, including repeat lab results as appropriate  - Instruct patient on fluid and nutrition as appropriate  Outcome: Progressing     Problem: HEMATOLOGIC - ADULT  Goal: Maintains hematologic stability  Description: INTERVENTIONS  - Assess for signs and symptoms of bleeding or hemorrhage  - Monitor labs  - Administer supportive blood products/factors as ordered and appropriate  Outcome: Progressing     Problem: Nutrition/Hydration-ADULT  Goal: Nutrient/Hydration intake appropriate for improving, restoring or maintaining nutritional needs  Description: Monitor and assess patient's nutrition/hydration status for malnutrition  Collaborate with interdisciplinary team and initiate plan and interventions as ordered  Monitor patient's weight and dietary intake as ordered or per policy  Utilize nutrition screening tool and intervene as necessary  Determine patient's food preferences and provide high-protein, high-caloric foods as appropriate       INTERVENTIONS:  - Monitor oral intake, urinary output, labs, and treatment plans  - Assess nutrition and hydration status and recommend course of action  - Evaluate amount of meals eaten  - Assist patient with eating if necessary   - Allow adequate time for meals  - Recommend/ encourage appropriate diets, oral nutritional supplements, and vitamin/mineral supplements  - Order, calculate, and assess calorie counts as needed  - Recommend, monitor, and adjust tube feedings and TPN/PPN based on assessed needs  - Assess need for intravenous fluids  - Provide specific nutrition/hydration education as appropriate  - Include patient/family/caregiver in decisions related to nutrition  Outcome: Progressing

## 2021-08-02 NOTE — PROGRESS NOTES
Progress Note - [facility]   Nu Vidales 24 y o  male MRN: [id number]  Unit/Bed#: [facility] 816-01 Encounter: [id number]    Assessment:  21yoM w PMH UC now POD 6 from laparoscopic subtotal colectomy with end ileostomy    Plan:  - c/w diet GI, Lo fiber/Lo residue  - pain control prn  - nausea control prn  - encourage IS  - OOB as tolerated  - c/w PO steroid taper per GI   - protonix  - SQH    Subjective/Objective     Subjective:   Pt is lying in bed and appears comfortable  Patient denies pain, pt denies n/v, pt denies CP/SOB  Pt reports he is tolerating diet well, out of bed to the bathroom  Objective:     Blood pressure 117/72, pulse 77, temperature 97 5 °F (36 4 °C), resp  rate 16, height 6' 3" (1 905 m), weight 96 4 kg (212 lb 8 4 oz), SpO2 97 %  ,Body mass index is 26 56 kg/m²        Intake/Output Summary (Last 24 hours) at 8/2/2021 0617  Last data filed at 8/2/2021 0000  Gross per 24 hour   Intake 22902 05 ml   Output 700 ml   Net 27677 05 ml       Invasive Devices     Peripherally Inserted Central Catheter Line            PICC Line 28/81/20 Right Basilic 11 days          Drain            Ileostomy RLQ 5 days                Physical Exam:   Gen: NAD  Neuro: A&Ox3  Head: Normal Cephalic, Atraumatic  CV: RRR, no m/r/g  Pulm: Lungs CTA b/l, no inc WOB  Abd: Soft, non-distended, reports some pain at the site of MEGHANA drain removal, incision c/d/i, ostomy present and draining  Ext: No edema  Skin: warm, dry, intact    Adela Early MD  8/2/21

## 2021-08-07 LAB
REF LAB TEST METHOD: NORMAL
TEST INTERPRETATION: NORMAL
TPMT RBC-CCNC: 34 UNITS/ML RBC

## 2021-08-09 ENCOUNTER — OFFICE VISIT (OUTPATIENT)
Dept: GASTROENTEROLOGY | Facility: CLINIC | Age: 21
End: 2021-08-09
Payer: COMMERCIAL

## 2021-08-09 VITALS
BODY MASS INDEX: 24.49 KG/M2 | HEART RATE: 117 BPM | HEIGHT: 75 IN | SYSTOLIC BLOOD PRESSURE: 118 MMHG | DIASTOLIC BLOOD PRESSURE: 60 MMHG | TEMPERATURE: 98 F | WEIGHT: 197 LBS

## 2021-08-09 DIAGNOSIS — K51.011 ULCERATIVE PANCOLITIS WITH RECTAL BLEEDING (HCC): Primary | ICD-10-CM

## 2021-08-09 PROCEDURE — 99244 OFF/OP CNSLTJ NEW/EST MOD 40: CPT | Performed by: INTERNAL MEDICINE

## 2021-08-09 NOTE — PROGRESS NOTES
[facility] - Outpatient Consultation  Nessa Tierney 24 y o  male MRN: [id number]  Encounter: [id number]    ASSESSMENT AND PLAN:    Nessa Tierney is a 24 y o  old male with PMH: severe ulcerative colitis s/p subtotal colectomy who presents for:     #Severe Ulcerative Colitis s/p Subtotal Colectomy w/ End Ileostomy (7/27/21)  Patient noted to have steroid refractory severe ulcerative colitis  Status post subtotal colectomy with end ileostomy on 07/27  Has been doing well since discharge  Did have some associated hypoalbuminemia and associated weight loss during hospitalization  Was getting TPN but now is eating appropriately and states he believes he has gained some weight  Postoperatively patient has been doing well with improved energy levels and no significant pain  Patient will follow up with surgical team to determine appropriate timing for J pouch and reconnection  At this time we will modify his steroid taper and reassess at 20 mg      Plan:  -follow-up with colorectal surgery in 2 weeks  -CBC, CMP  -fecal calprotectin, CRP  -modified steroid taper   -finish 50mg qdaily this week   -one week at prednisone 40mg qdaily   -plan to decrease by 10mg weekly, until 20mg    -will reassess at 20mg dosing  -continue with nutritional assessment   -continue protein supplementation    #Fe Deficiency Anemia  Patient did have some iron deficient anemia likely in the setting of significant rectal bleeding but will recheck to ensure that it has improved since surgery  -CBC as above    ______________________________________________________________________    HPI:    Patient reffered by Shannan Ramires DO  Patient presenting after recent discharge from [address] [place] [address] for recent diagnosis of ulcerative colitis  Patient was initially seen at [facility] in early July when he was noted to have multiple bloody stools    Patient underwent colonoscopy and was diagnosed with ulcerative colitis and discharged home  Symptoms continued to worsen  He was then started on Remicade and received 3 transfusions with some mild improvement but no clinical improvement when he was then readmitted to the hospital in mid July  Patient then was seen by the Colorectal surgery Service and decision was made to undergo surgery  Patient was taken to the OR on 07/27 for laparoscopic subtotal colectomy with end ileostomy in the setting of poor response to medical management  He also had associated bloody bowel movements and hemoglobin drops during the hospitalization  Patient tolerated the procedure well and was discharged home on 08/02/2021 on a steroid taper  Since discharge patient states he has been feeling well  Has been having roughly 3-4 bowel movements a day  No evidence of blood or hematochezia noted within his ostomy  His stool consistency has fluctuated depending on how much water he drinks but has been more solid with the last day or two  States he feels well overall  Has been increasing his appetite and denies any significant weight loss  No fever chills  No recent sick contacts or travel  Last EGD: None  Last colonoscopy: Flex sig on 7/20/21 - Severe Coombs 3 Colitis    REVIEW OF SYSTEMS:    CONSTITUTIONAL: Denies any fever, chills, rigors, and weight loss  HEENT: No earache or tinnitus  Denies hearing loss or visual disturbances  CARDIOVASCULAR: No chest pain or palpitations  RESPIRATORY: Denies any cough, hemoptysis, shortness of breath or dyspnea on exertion  GASTROINTESTINAL: As noted in the History of Present Illness  GENITOURINARY: No problems with urination  Denies any hematuria or dysuria  NEUROLOGIC: No dizziness or vertigo, denies headaches  MUSCULOSKELETAL: Denies any muscle or joint pain  SKIN: Denies skin rashes or itching  ENDOCRINE: Denies excessive thirst  Denies intolerance to heat or cold  PSYCHOSOCIAL: Denies depression or anxiety   Denies any recent memory loss  Historical Information   Past Medical History:   Diagnosis Date    Colitis, ulcerative ([address] )      Past Surgical History:   Procedure Laterality Date    HEMICOLOECTOMY W/ ANASTOMOSIS N/A 7/27/2021    Procedure: LAPAROSCOPIC HAND ASSISTED SUBCOLECTOMY; END ILEOSTOMY;  Surgeon: Randa Rader MD;  Location: [facility];  Service: Colorectal     Social History   Social History     Substance and Sexual Activity   Alcohol Use Never    Comment: social use only     Social History     Substance and Sexual Activity   Drug Use Not Currently     Social History     Tobacco Use   Smoking Status Never Smoker   Smokeless Tobacco Never Used     No family history on file  Meds/Allergies       Current Outpatient Medications:     methocarbamol (ROBAXIN) 500 mg tablet    pantoprazole (PROTONIX) 40 mg tablet    [START ON 9/9/2021] predniSONE 10 mg tablet    predniSONE 20 mg tablet    [START ON 8/16/2021] predniSONE 20 mg tablet    [START ON 8/24/2021] predniSONE 20 mg tablet    [START ON 9/1/2021] predniSONE 20 mg tablet  No Known Allergies    Objective     There were no vitals taken for this visit  There is no height or weight on file to calculate BMI  PHYSICAL EXAM:      General Appearance: Chucky Tanner male who is alert, cooperative, no distress   HEENT:   Normocephalic, atraumatic, anicteric   Neck:  Supple, symmetrical, trachea midline   Lungs:   Clear to auscultation bilaterally; no rales, rhonchi or wheezing; respirations unlabored    Heart:   Regular rate and rhythm; no murmur, rub, or gallop  Abdomen:   Ostomy site, C/D/I   Genitalia:   Deferred    Rectal:   Deferred    Extremities:  No cyanosis, clubbing or edema    Pulses:  2+ and symmetric    Skin:  No jaundice, rashes, or lesions    Lymph nodes:  No palpable cervical lymphadenopathy        Lab Results:   No visits with results within 1 Day(s) from this visit     Latest known visit with results is:   No results displayed because visit has over 200 results  Radiology Results:   MRI abdomen w wo contrast and mrcp    Result Date: 7/14/2021  Narrative: MRI OF THE ABDOMEN WITH AND WITHOUT CONTRAST WITH MRCP INDICATION:  History of ulcerative colitis with elevated LFTs  COMPARISON: CT scans from 6/22/2021 and 7/2/2021  TECHNIQUE:  The following pulse sequences were obtained:  Coronal and axial T2 with TE of 90 and 180 respectively, axial T2 with fat saturation, axial FIESTA fat-sat, axial T1-weighted in-and-out-of phase, axial DWI/ADC, pre-contrast axial T1 with fat saturation, post-contrast dynamic axial T1 with fat saturation at 20, 70, and 180 seconds, followed by coronal and 7 minute delayed axial T1 with fat saturation  3D MRCP images were obtained with radial thick slabs and projections  3D rendering was performed from the acquisition scanner  IV Contrast:  10 mL of Gadobutrol injection (SINGLE-DOSE) FINDINGS: LOWER CHEST:   Unremarkable  LIVER: Normal in size and configuration  No suspicious mass  The hepatic veins and portal veins are patent  Diffusely decreased T1 and T2 signal consistent with heavy metal deposition such as hemochromatosis  BILE DUCTS:  No intrahepatic or extrahepatic bile duct dilation  Common bile duct is normal in caliber  No choledocholithiasis, biliary stricture or suspicious mass  No delayed enhancement of the biliary tree to suggest cholangitis  GALLBLADDER:  Normal  PANCREAS:  Normal  No main pancreatic ductal dilation  ADRENAL GLANDS:  Normal  SPLEEN:  Splenomegaly measuring 14 1 cm  No focal lesion  KIDNEYS/PROXIMAL URETERS:  No hydroureteronephrosis  No suspicious renal mass  BOWEL:   Thickening of the descending colon consistent with ulcerative colitis  No bowel obstruction  PERITONEUM/RETROPERITONEUM:  No ascites  LYMPH NODES:  No abdominal lymphadenopathy  VASCULAR STRUCTURES:  No aneurysm  ABDOMINAL WALL:  Unremarkable  OSSEOUS STRUCTURES:  No suspicious osseous lesion  Impression: Normal MRCP  Diffusely decreased T1 and T2 signal consistent with heavy metal deposition such as hemochromatosis  Thickening of the descending colon consistent with ulcerative colitis  No bowel obstruction  Workstation performed: [id number]     Flexible Sigmoidoscopy    Result Date: 7/20/2021  Narrative: [facility] [id number] [address] [phone] [address]: 7/20/21 PHYSICIAN(S): Jamie Jeffers MD - Attending Physician My Awan MD - Fellow INDICATION: Ulcerative pancolitis with rectal bleeding [facility]) Colonoscopy performed for a diagnostic indication  POST-OP DIAGNOSIS: See the impression below  HISTORY: Prior colonoscopy: Less than 3 years ago  It is being repeated at an interval of less than 3 years because: This colonoscopy is being performed for a diagnostic indication BOWEL PREPARATION:  PREPROCEDURE: Informed consent was obtained for the procedure, including sedation  Risks including but not limited to bleeding, infection, perforation, adverse drug reaction and aspiration were explained in detail  Also explained about less than 100% sensitivity with the exam and other alternatives  The patient was placed in the left lateral decubitus position  DETAILS OF PROCEDURE: Patient was taken to the procedure room where a time out was performed to confirm correct patient and correct procedure  The patient underwent monitored anesthesia care, which was administered by an anesthesia professional  The patient's blood pressure, heart rate, level of consciousness, oxygen and respirations were monitored throughout the procedure  A digital rectal exam was performed  The scope was introduced through the anus and advanced to the sigmoid colon  Retroflexion was performed in the rectum  The patient experienced no blood loss  The procedure was not difficult  The patient tolerated the procedure well  There were no apparent complications   ANESTHESIA INFORMATION: ASA: II Anesthesia Type: IV Sedation with Anesthesia MEDICATIONS: No administrations occurring from 1154 to 1216 on 07/20/21 FINDINGS: Cobblestone, edematous, erythematous, friable and granular mucosa with erosion and loss of vascular pattern in the descending colon, sigmoid colon, rectosigmoid and rectum Performed biopsies to rule out colitis in the sigmoid colon, rectosigmoid and rectum  These biopsies were taken assess colitis and rule out CMV  EVENTS: Procedure Events Event Event Time ENDO SCOPE OUT TIME 7/20/2021 12:14 PM SPECIMENS: ID Type Source Tests Collected by Time Destination 1 : cold bx; colitis  Tissue Large Intestine, Sigmoid Colon TISSUE EXAM Puma Zambrano MD 7/20/2021 12:08 PM  2 : cold bx; colitis  Tissue Rectum TISSUE EXAM Puma Pitts MD 7/20/2021 12:14 PM  A : cold bx Tissue Large Intestine, Sigmoid Colon CMV CULTURE Puma Zambrano MD 7/20/2021 12:13 PM  EQUIPMENT:     Impression: Severe Coombs 3 colitis extending from the rectum to the sigmoid colon  Biopsies taken as above to assess colitis and rule out CMV  RECOMMENDATION: Will give Remicade 10 milligrams/kilogram today  Spoke to pharmacy and placed orders  Continue IV steroids Will strongly need to consider colectomy  Restart low-fiber/low residue diet  Will start Ensure supplementation ATTENDING ATTESTATION:  I was present throughout the entire procedure from insertion to complete withdrawal of the scope  I performed all interventions myself or oversaw the fellow    Bernarda Alicea MD       ---------------------------------------------------  Note Electronically Signed By:    MD Michelle Romero 73 Gastroenterology Fellow PGY-5  [id number] [employer] #: [id number]

## 2021-08-23 ENCOUNTER — APPOINTMENT (OUTPATIENT)
Dept: LAB | Facility: HOSPITAL | Age: 21
End: 2021-08-23
Payer: COMMERCIAL

## 2021-08-23 DIAGNOSIS — K51.011 ULCERATIVE PANCOLITIS WITH RECTAL BLEEDING (HCC): ICD-10-CM

## 2021-08-23 LAB
ALBUMIN SERPL BCP-MCNC: 3.4 G/DL (ref 3.5–5)
ALP SERPL-CCNC: 55 U/L (ref 46–116)
ALT SERPL W P-5'-P-CCNC: 68 U/L (ref 12–78)
ANION GAP SERPL CALCULATED.3IONS-SCNC: 6 MMOL/L (ref 4–13)
AST SERPL W P-5'-P-CCNC: 16 U/L (ref 5–45)
BASOPHILS # BLD AUTO: 0.03 THOUSANDS/ΜL (ref 0–0.1)
BASOPHILS NFR BLD AUTO: 1 % (ref 0–1)
BILIRUB SERPL-MCNC: 0.52 MG/DL (ref 0.2–1)
BUN SERPL-MCNC: 15 MG/DL (ref 5–25)
CALCIUM ALBUM COR SERPL-MCNC: 9.6 MG/DL (ref 8.3–10.1)
CALCIUM SERPL-MCNC: 9.1 MG/DL (ref 8.3–10.1)
CHLORIDE SERPL-SCNC: 104 MMOL/L (ref 100–108)
CO2 SERPL-SCNC: 30 MMOL/L (ref 21–32)
CREAT SERPL-MCNC: 0.8 MG/DL (ref 0.6–1.3)
CRP SERPL QL: <3 MG/L
EOSINOPHIL # BLD AUTO: 0.08 THOUSAND/ΜL (ref 0–0.61)
EOSINOPHIL NFR BLD AUTO: 2 % (ref 0–6)
ERYTHROCYTE [DISTWIDTH] IN BLOOD BY AUTOMATED COUNT: 15.5 % (ref 11.6–15.1)
GFR SERPL CREATININE-BSD FRML MDRD: 128 ML/MIN/1.73SQ M
GLUCOSE SERPL-MCNC: 87 MG/DL (ref 65–140)
HCT VFR BLD AUTO: 38.3 % (ref 36.5–49.3)
HGB BLD-MCNC: 11.8 G/DL (ref 12–17)
IMM GRANULOCYTES # BLD AUTO: 0.03 THOUSAND/UL (ref 0–0.2)
IMM GRANULOCYTES NFR BLD AUTO: 1 % (ref 0–2)
LYMPHOCYTES # BLD AUTO: 1.99 THOUSANDS/ΜL (ref 0.6–4.47)
LYMPHOCYTES NFR BLD AUTO: 36 % (ref 14–44)
MCH RBC QN AUTO: 28.2 PG (ref 26.8–34.3)
MCHC RBC AUTO-ENTMCNC: 30.8 G/DL (ref 31.4–37.4)
MCV RBC AUTO: 92 FL (ref 82–98)
MONOCYTES # BLD AUTO: 0.55 THOUSAND/ΜL (ref 0.17–1.22)
MONOCYTES NFR BLD AUTO: 10 % (ref 4–12)
NEUTROPHILS # BLD AUTO: 2.82 THOUSANDS/ΜL (ref 1.85–7.62)
NEUTS SEG NFR BLD AUTO: 50 % (ref 43–75)
NRBC BLD AUTO-RTO: 0 /100 WBCS
PLATELET # BLD AUTO: 148 THOUSANDS/UL (ref 149–390)
PMV BLD AUTO: 11 FL (ref 8.9–12.7)
POTASSIUM SERPL-SCNC: 3.7 MMOL/L (ref 3.5–5.3)
PROT SERPL-MCNC: 6.8 G/DL (ref 6.4–8.2)
RBC # BLD AUTO: 4.18 MILLION/UL (ref 3.88–5.62)
SODIUM SERPL-SCNC: 140 MMOL/L (ref 136–145)
WBC # BLD AUTO: 5.5 THOUSAND/UL (ref 4.31–10.16)

## 2021-08-23 PROCEDURE — 86140 C-REACTIVE PROTEIN: CPT

## 2021-08-23 PROCEDURE — 85025 COMPLETE CBC W/AUTO DIFF WBC: CPT

## 2021-08-23 PROCEDURE — 83993 ASSAY FOR CALPROTECTIN FECAL: CPT

## 2021-08-23 PROCEDURE — 36415 COLL VENOUS BLD VENIPUNCTURE: CPT

## 2021-08-23 PROCEDURE — 80053 COMPREHEN METABOLIC PANEL: CPT

## 2021-08-26 LAB — CALPROTECTIN STL-MCNT: 48 UG/G (ref 0–120)

## 2021-09-21 ENCOUNTER — TELEPHONE (OUTPATIENT)
Dept: SCHEDULING | Age: 21
End: 2021-09-21

## 2021-09-22 ENCOUNTER — OFFICE VISIT (OUTPATIENT)
Dept: INTERNAL MEDICINE | Age: 21
End: 2021-09-22

## 2021-09-22 VITALS
WEIGHT: 188.6 LBS | HEIGHT: 75 IN | SYSTOLIC BLOOD PRESSURE: 100 MMHG | BODY MASS INDEX: 23.45 KG/M2 | OXYGEN SATURATION: 97 % | TEMPERATURE: 96.7 F | HEART RATE: 72 BPM | DIASTOLIC BLOOD PRESSURE: 60 MMHG

## 2021-09-22 DIAGNOSIS — J30.1 SEASONAL ALLERGIC RHINITIS DUE TO POLLEN: Primary | ICD-10-CM

## 2021-09-22 DIAGNOSIS — R51.9 SINUS HEADACHE: ICD-10-CM

## 2021-09-22 PROCEDURE — 99203 OFFICE O/P NEW LOW 30 MIN: CPT | Performed by: INTERNAL MEDICINE

## 2021-09-22 RX ORDER — MONTELUKAST SODIUM 10 MG/1
10 TABLET ORAL NIGHTLY
Qty: 30 TABLET | Refills: 1 | Status: SHIPPED | OUTPATIENT
Start: 2021-09-22 | End: 2021-10-15

## 2021-09-22 RX ORDER — PREDNISONE 20 MG/1
40 TABLET ORAL DAILY
Qty: 10 TABLET | Refills: 0 | Status: SHIPPED | OUTPATIENT
Start: 2021-09-22

## 2021-09-22 RX ORDER — FLUTICASONE PROPIONATE 50 MCG
2 SPRAY, SUSPENSION (ML) NASAL DAILY
Qty: 16 G | Refills: 12 | Status: SHIPPED | OUTPATIENT
Start: 2021-09-22

## 2021-09-22 ASSESSMENT — PATIENT HEALTH QUESTIONNAIRE - PHQ9
SUM OF ALL RESPONSES TO PHQ9 QUESTIONS 1 AND 2: 2
CLINICAL INTERPRETATION OF PHQ2 SCORE: NO FURTHER SCREENING NEEDED
1. LITTLE INTEREST OR PLEASURE IN DOING THINGS: MORE THAN HALF THE DAYS
SUM OF ALL RESPONSES TO PHQ9 QUESTIONS 1 AND 2: 2
2. FEELING DOWN, DEPRESSED OR HOPELESS: NOT AT ALL
CLINICAL INTERPRETATION OF PHQ9 SCORE: NO FURTHER SCREENING NEEDED

## 2021-09-22 ASSESSMENT — ENCOUNTER SYMPTOMS
HEMATOLOGIC/LYMPHATIC NEGATIVE: 1
RHINORRHEA: 1
RESPIRATORY NEGATIVE: 1
CONSTITUTIONAL NEGATIVE: 1
NEUROLOGICAL NEGATIVE: 1
EYES NEGATIVE: 1
PSYCHIATRIC NEGATIVE: 1
ENDOCRINE NEGATIVE: 1
GASTROINTESTINAL NEGATIVE: 1

## 2021-09-22 ASSESSMENT — PAIN SCALES - GENERAL: PAINLEVEL: 6

## 2021-10-15 DIAGNOSIS — J30.1 SEASONAL ALLERGIC RHINITIS DUE TO POLLEN: ICD-10-CM

## 2021-10-15 RX ORDER — MONTELUKAST SODIUM 10 MG/1
TABLET ORAL
Qty: 30 TABLET | Refills: 1 | Status: SHIPPED | OUTPATIENT
Start: 2021-10-15

## 2022-01-04 DIAGNOSIS — K51.00 ULCERATIVE PANCOLITIS WITHOUT COMPLICATION (HCC): Primary | ICD-10-CM

## 2022-01-06 DIAGNOSIS — K51.00 ULCERATIVE PANCOLITIS WITHOUT COMPLICATION (HCC): Primary | ICD-10-CM

## 2022-07-05 ENCOUNTER — TELEPHONE (OUTPATIENT)
Dept: SCHEDULING | Age: 22
End: 2022-07-05

## 2022-07-06 ENCOUNTER — OFFICE VISIT (OUTPATIENT)
Dept: INTERNAL MEDICINE | Age: 22
End: 2022-07-06

## 2022-07-06 VITALS
WEIGHT: 190 LBS | SYSTOLIC BLOOD PRESSURE: 107 MMHG | HEART RATE: 73 BPM | TEMPERATURE: 98.2 F | RESPIRATION RATE: 18 BRPM | BODY MASS INDEX: 23.75 KG/M2 | OXYGEN SATURATION: 100 % | DIASTOLIC BLOOD PRESSURE: 71 MMHG

## 2022-07-06 DIAGNOSIS — Z20.2 POSSIBLE EXPOSURE TO STD: ICD-10-CM

## 2022-07-06 DIAGNOSIS — Z00.00 ANNUAL PHYSICAL EXAM: Primary | ICD-10-CM

## 2022-07-06 PROCEDURE — 99395 PREV VISIT EST AGE 18-39: CPT | Performed by: INTERNAL MEDICINE

## 2022-07-07 ENCOUNTER — APPOINTMENT (OUTPATIENT)
Dept: LAB | Age: 22
End: 2022-07-07

## 2022-07-07 ENCOUNTER — TELEPHONE (OUTPATIENT)
Dept: SCHEDULING | Age: 22
End: 2022-07-07

## 2022-07-10 ASSESSMENT — ENCOUNTER SYMPTOMS
ALLERGIC/IMMUNOLOGIC NEGATIVE: 1
GASTROINTESTINAL NEGATIVE: 1
ENDOCRINE NEGATIVE: 1
RESPIRATORY NEGATIVE: 1
CONSTITUTIONAL NEGATIVE: 1
EYES NEGATIVE: 1
NEUROLOGICAL NEGATIVE: 1
HEMATOLOGIC/LYMPHATIC NEGATIVE: 1
PSYCHIATRIC NEGATIVE: 1

## 2023-03-31 ENCOUNTER — TELEPHONE (OUTPATIENT)
Dept: INTERNAL MEDICINE | Age: 23
End: 2023-03-31

## 2024-01-02 ENCOUNTER — TELEPHONE (OUTPATIENT)
Dept: INTERNAL MEDICINE | Age: 24
End: 2024-01-02

## 2024-07-27 NOTE — PLAN OF CARE

## 2025-02-24 NOTE — CASE MANAGEMENT
CM met with Pt, explained CM program/CM role  LOS- 11 hours   Bundle- No    Unplanned readmission color- (12, green low)  30 Day readmit- Yes  Pt lives with mom in a [address] with 1STE to enter  Pt's bedroom is located on the 2nd floor  Pt has no assigned caregiver  Pt was independent prior to admission  Pt drives  Pt's PCP provider is Magnus Whaley DO Phone: [phone]  Pt's pharmacy is [facility] Phone: [phone]  Pt is able to afford medications  Pt has no hx of MH issues or D&A  Pt is employed and in college  Pt's EC is his mother, Ivonne Bolanos [phone] [facility])  Pt has no Medical POA or LW  Pt's transport at DC will be his mother via car  DC plan is TBD  CM will continue to follow until Pt is medically stable for DC  CM reviewed d/c planning process including the following: identifying help at home, patient preference for d/c planning needs, Discharge Lounge, [facility] Meds to Bed program, availability of treatment team to discuss questions or concerns patient and/or family may have regarding understanding medications and recognizing signs and symptoms once discharged  CM also encouraged patient to follow up with all recommended appointments after discharge  Patient advised of importance for patient and family to participate in managing patients medical well being 
CM sent [facility] referral for SN and Ostomy care after DC  CM will see if they are willing to accept the Pt  CM will continue to follow until confirmed  Pt is not medically stable yet for DC 
Pt has been accepted with [address] [phone] for SN, PT/OT, and ostomy care  CM will put all information on AVS summary  CM will let PA-C know of DC plan  CM will continue to follow the Pt until DC later today  Pt is medically stable 
Render Risk Assessment In Note?: no
Detail Level: Simple
Additional Notes[facility]

## (undated) DEVICE — OSTO POUCH DRAINABLE W/FILTER 3/4 X 12IN ESTEEM

## (undated) DEVICE — TROCAR: Brand: KII SLEEVE

## (undated) DEVICE — ANTIBACTERIAL UNDYED BRAIDED (POLYGLACTIN 910), SYNTHETIC ABSORBABLE SUTURE: Brand: COATED VICRYL

## (undated) DEVICE — GLOVE INDICATOR PI UNDERGLOVE SZ 8.5 BLUE

## (undated) DEVICE — FIRST STEP BEDSIDE KIT - STAND-UP POUCH, ENDOSCOPIC CLEANING PAD - 1 POUCH: Brand: FIRST STEP BEDSIDE KIT - STAND-UP POUCH, ENDOSCOPIC CLEANING PAD

## (undated) DEVICE — PACK PBDS STERILE LAP LITHOTOMY RF

## (undated) DEVICE — CO2 AND WATER TUBING/CAP SET FOR OLYMPUS® SCOPES & UCR: Brand: ERBE

## (undated) DEVICE — SUT PROLENE 2-0 SH 36 IN 8523H

## (undated) DEVICE — TUBING SMOKE EVAC W/FILTRATION DEVICE PLUMEPORT ACTIV

## (undated) DEVICE — MEDI-VAC YANK SUCT HNDL W/TPRD BULBOUS TIP: Brand: CARDINAL HEALTH

## (undated) DEVICE — TRAY FOLEY 16FR URIMETER SILICONE SURESTEP

## (undated) DEVICE — TROCAR: Brand: KII FIOS FIRST ENTRY

## (undated) DEVICE — VISUALIZATION SYSTEM: Brand: CLEARIFY

## (undated) DEVICE — IRRIG ENDO FLO TUBING

## (undated) DEVICE — SPONGE STICK WITH PVP-I: Brand: KENDALL

## (undated) DEVICE — TOWEL SURG XR DETECT GREEN STRL RFD

## (undated) DEVICE — CONTOUR CURVED CUTTER STAPLER: Brand: CONTOUR

## (undated) DEVICE — UNDER BUTTOCKS DRAPE W/FLUID CONTROL POUCH: Brand: CONVERTORS

## (undated) DEVICE — ENSEAL LAPAROSCOPIC TISSUE SEALER G2 CURVED JAW FOR USE WITH G2 GENERATOR 5MM DIAMETER 35CM SHAFT LENGTH: Brand: ENSEAL

## (undated) DEVICE — GAUZE SPONGES,16 PLY: Brand: CURITY

## (undated) DEVICE — GLOVE SRG BIOGEL 8

## (undated) DEVICE — ADHESIVE SKIN HIGH VISCOSITY EXOFIN 1ML

## (undated) DEVICE — 40595 XL TRENDELENBURG POSITIONING KIT: Brand: 40595 XL TRENDELENBURG POSITIONING KIT

## (undated) DEVICE — PLUMEPEN PRO 10FT

## (undated) DEVICE — INTENDED FOR TISSUE SEPARATION, AND OTHER PROCEDURES THAT REQUIRE A SHARP SURGICAL BLADE TO PUNCTURE OR CUT.: Brand: BARD-PARKER SAFETY BLADES SIZE 15, STERILE

## (undated) DEVICE — 3000CC GUARDIAN II: Brand: GUARDIAN

## (undated) DEVICE — SUT PDS PLUS 1 CTX 36IN PDP371T

## (undated) DEVICE — ACCESS PLATFORM FOR MINIMALLY INVASIVE SURGERY.: Brand: GELPORT® LAPAROSCOPIC  SYSTEM

## (undated) DEVICE — INSUFLATION TUBING INSUFLOW (LEXION)

## (undated) DEVICE — STERILE EMESIS BASIN                 070: Brand: CARDINAL HEALTH

## (undated) DEVICE — CONTOUR CURVED CUTTER STAPLER RELOAD: Brand: CONTOUR

## (undated) DEVICE — 3M™ IOBAN™ 2 ANTIMICROBIAL INCISE DRAPE 6650EZ: Brand: IOBAN™ 2

## (undated) DEVICE — SUT MONOCRYL 4-0 PS-2 18 IN Y496G

## (undated) DEVICE — SUT VICRYL 3-0 SH 27 IN J416H

## (undated) DEVICE — Device: Brand: DEFENDO AIR/WATER/SUCTION AND BIOPSY VALVE

## (undated) DEVICE — ELECTRODE BLADE MOD  E-Z CLEAN 6.5IN -0014M

## (undated) DEVICE — SUT VICRYL 0 54 IN J207G